# Patient Record
Sex: MALE | Race: WHITE | Employment: OTHER | URBAN - METROPOLITAN AREA
[De-identification: names, ages, dates, MRNs, and addresses within clinical notes are randomized per-mention and may not be internally consistent; named-entity substitution may affect disease eponyms.]

---

## 2017-06-22 ENCOUNTER — OFFICE VISIT (OUTPATIENT)
Dept: FAMILY MEDICINE CLINIC | Age: 67
End: 2017-06-22

## 2017-06-22 VITALS
HEART RATE: 72 BPM | BODY MASS INDEX: 34.27 KG/M2 | SYSTOLIC BLOOD PRESSURE: 130 MMHG | TEMPERATURE: 97.8 F | DIASTOLIC BLOOD PRESSURE: 82 MMHG | WEIGHT: 231.4 LBS | HEIGHT: 69 IN

## 2017-06-22 DIAGNOSIS — K63.5 COLON POLYP: ICD-10-CM

## 2017-06-22 DIAGNOSIS — R10.12 LEFT UPPER QUADRANT PAIN: ICD-10-CM

## 2017-06-22 DIAGNOSIS — L91.8 CUTANEOUS SKIN TAGS: ICD-10-CM

## 2017-06-22 DIAGNOSIS — R10.12 LEFT UPPER QUADRANT PAIN: Primary | ICD-10-CM

## 2017-06-22 LAB
A/G RATIO: 1.9 (ref 1.1–2.2)
ALBUMIN SERPL-MCNC: 4.3 G/DL (ref 3.4–5)
ALP BLD-CCNC: 69 U/L (ref 40–129)
ALT SERPL-CCNC: 29 U/L (ref 10–40)
AMYLASE: 35 U/L (ref 25–115)
ANION GAP SERPL CALCULATED.3IONS-SCNC: 16 MMOL/L (ref 3–16)
AST SERPL-CCNC: 19 U/L (ref 15–37)
BILIRUB SERPL-MCNC: 1.3 MG/DL (ref 0–1)
BILIRUBIN URINE: NEGATIVE
BLOOD, URINE: NEGATIVE
BUN BLDV-MCNC: 18 MG/DL (ref 7–20)
CALCIUM SERPL-MCNC: 9.4 MG/DL (ref 8.3–10.6)
CHLORIDE BLD-SCNC: 103 MMOL/L (ref 99–110)
CLARITY: CLEAR
CO2: 23 MMOL/L (ref 21–32)
COLOR: YELLOW
CREAT SERPL-MCNC: 1 MG/DL (ref 0.8–1.3)
EPITHELIAL CELLS, UA: 0 /HPF (ref 0–5)
GFR AFRICAN AMERICAN: >60
GFR NON-AFRICAN AMERICAN: >60
GLOBULIN: 2.3 G/DL
GLUCOSE BLD-MCNC: 106 MG/DL (ref 70–99)
GLUCOSE URINE: NEGATIVE MG/DL
HCT VFR BLD CALC: 47.7 % (ref 40.5–52.5)
HEMOGLOBIN: 15.6 G/DL (ref 13.5–17.5)
HYALINE CASTS: 1 /LPF (ref 0–8)
KETONES, URINE: NEGATIVE MG/DL
LEUKOCYTE ESTERASE, URINE: NEGATIVE
LIPASE: 25 U/L (ref 13–60)
MCH RBC QN AUTO: 30.8 PG (ref 26–34)
MCHC RBC AUTO-ENTMCNC: 32.7 G/DL (ref 31–36)
MCV RBC AUTO: 94.3 FL (ref 80–100)
MICROSCOPIC EXAMINATION: NORMAL
NITRITE, URINE: NEGATIVE
PDW BLD-RTO: 13.5 % (ref 12.4–15.4)
PH UA: 6
PLATELET # BLD: 169 K/UL (ref 135–450)
PMV BLD AUTO: 11 FL (ref 5–10.5)
POTASSIUM SERPL-SCNC: 4.4 MMOL/L (ref 3.5–5.1)
PROTEIN UA: NEGATIVE MG/DL
RBC # BLD: 5.06 M/UL (ref 4.2–5.9)
RBC UA: 1 /HPF (ref 0–4)
SODIUM BLD-SCNC: 142 MMOL/L (ref 136–145)
SPECIFIC GRAVITY UA: 1.02
TOTAL PROTEIN: 6.6 G/DL (ref 6.4–8.2)
UROBILINOGEN, URINE: 0.2 E.U./DL
WBC # BLD: 6.7 K/UL (ref 4–11)
WBC UA: 2 /HPF (ref 0–5)

## 2017-06-22 PROCEDURE — 3017F COLORECTAL CA SCREEN DOC REV: CPT | Performed by: FAMILY MEDICINE

## 2017-06-22 PROCEDURE — 1036F TOBACCO NON-USER: CPT | Performed by: FAMILY MEDICINE

## 2017-06-22 PROCEDURE — 1123F ACP DISCUSS/DSCN MKR DOCD: CPT | Performed by: FAMILY MEDICINE

## 2017-06-22 PROCEDURE — G8417 CALC BMI ABV UP PARAM F/U: HCPCS | Performed by: FAMILY MEDICINE

## 2017-06-22 PROCEDURE — G8427 DOCREV CUR MEDS BY ELIG CLIN: HCPCS | Performed by: FAMILY MEDICINE

## 2017-06-22 PROCEDURE — 4040F PNEUMOC VAC/ADMIN/RCVD: CPT | Performed by: FAMILY MEDICINE

## 2017-06-22 PROCEDURE — 99213 OFFICE O/P EST LOW 20 MIN: CPT | Performed by: FAMILY MEDICINE

## 2017-06-22 RX ORDER — PANTOPRAZOLE SODIUM 40 MG/1
40 TABLET, DELAYED RELEASE ORAL DAILY
Qty: 30 TABLET | Refills: 2 | Status: SHIPPED | OUTPATIENT
Start: 2017-06-22 | End: 2018-06-12 | Stop reason: CLARIF

## 2017-07-11 ENCOUNTER — OFFICE VISIT (OUTPATIENT)
Dept: FAMILY MEDICINE CLINIC | Age: 67
End: 2017-07-11

## 2017-07-11 VITALS
BODY MASS INDEX: 32.88 KG/M2 | TEMPERATURE: 98.3 F | DIASTOLIC BLOOD PRESSURE: 74 MMHG | WEIGHT: 222 LBS | HEIGHT: 69 IN | SYSTOLIC BLOOD PRESSURE: 116 MMHG

## 2017-07-11 DIAGNOSIS — L60.0 INGROWN NAIL: ICD-10-CM

## 2017-07-11 DIAGNOSIS — K57.32 DIVERTICULITIS OF LARGE INTESTINE WITHOUT PERFORATION OR ABSCESS WITHOUT BLEEDING: Primary | ICD-10-CM

## 2017-07-11 PROCEDURE — G8417 CALC BMI ABV UP PARAM F/U: HCPCS | Performed by: FAMILY MEDICINE

## 2017-07-11 PROCEDURE — G8427 DOCREV CUR MEDS BY ELIG CLIN: HCPCS | Performed by: FAMILY MEDICINE

## 2017-07-11 PROCEDURE — 1036F TOBACCO NON-USER: CPT | Performed by: FAMILY MEDICINE

## 2017-07-11 PROCEDURE — 4040F PNEUMOC VAC/ADMIN/RCVD: CPT | Performed by: FAMILY MEDICINE

## 2017-07-11 PROCEDURE — 3017F COLORECTAL CA SCREEN DOC REV: CPT | Performed by: FAMILY MEDICINE

## 2017-07-11 PROCEDURE — 99213 OFFICE O/P EST LOW 20 MIN: CPT | Performed by: FAMILY MEDICINE

## 2017-07-11 PROCEDURE — 1123F ACP DISCUSS/DSCN MKR DOCD: CPT | Performed by: FAMILY MEDICINE

## 2017-07-14 ENCOUNTER — TELEPHONE (OUTPATIENT)
Dept: FAMILY MEDICINE CLINIC | Age: 67
End: 2017-07-14

## 2017-07-28 ENCOUNTER — TELEPHONE (OUTPATIENT)
Dept: FAMILY MEDICINE CLINIC | Age: 67
End: 2017-07-28

## 2017-08-02 ENCOUNTER — TELEPHONE (OUTPATIENT)
Dept: FAMILY MEDICINE CLINIC | Age: 67
End: 2017-08-02

## 2017-09-18 ENCOUNTER — OFFICE VISIT (OUTPATIENT)
Dept: FAMILY MEDICINE CLINIC | Age: 67
End: 2017-09-18

## 2017-09-18 VITALS
WEIGHT: 207.2 LBS | HEIGHT: 69 IN | BODY MASS INDEX: 30.69 KG/M2 | HEART RATE: 72 BPM | TEMPERATURE: 98.1 F | SYSTOLIC BLOOD PRESSURE: 110 MMHG | DIASTOLIC BLOOD PRESSURE: 64 MMHG

## 2017-09-18 DIAGNOSIS — Z12.5 SPECIAL SCREENING FOR MALIGNANT NEOPLASM OF PROSTATE: ICD-10-CM

## 2017-09-18 DIAGNOSIS — K57.30 DIVERTICULOSIS OF LARGE INTESTINE WITHOUT HEMORRHAGE: Primary | ICD-10-CM

## 2017-09-18 DIAGNOSIS — R73.09 ELEVATED GLUCOSE: ICD-10-CM

## 2017-09-18 PROCEDURE — G8417 CALC BMI ABV UP PARAM F/U: HCPCS | Performed by: FAMILY MEDICINE

## 2017-09-18 PROCEDURE — 3017F COLORECTAL CA SCREEN DOC REV: CPT | Performed by: FAMILY MEDICINE

## 2017-09-18 PROCEDURE — 1036F TOBACCO NON-USER: CPT | Performed by: FAMILY MEDICINE

## 2017-09-18 PROCEDURE — 4040F PNEUMOC VAC/ADMIN/RCVD: CPT | Performed by: FAMILY MEDICINE

## 2017-09-18 PROCEDURE — 1123F ACP DISCUSS/DSCN MKR DOCD: CPT | Performed by: FAMILY MEDICINE

## 2017-09-18 PROCEDURE — 99213 OFFICE O/P EST LOW 20 MIN: CPT | Performed by: FAMILY MEDICINE

## 2017-09-18 PROCEDURE — G8427 DOCREV CUR MEDS BY ELIG CLIN: HCPCS | Performed by: FAMILY MEDICINE

## 2017-09-18 ASSESSMENT — ENCOUNTER SYMPTOMS
SHORTNESS OF BREATH: 0
CONSTIPATION: 0
ABDOMINAL DISTENTION: 0
COUGH: 0
NAUSEA: 0
BLOOD IN STOOL: 0
CHEST TIGHTNESS: 0
ABDOMINAL PAIN: 0
DIARRHEA: 0
VOMITING: 0

## 2017-09-18 ASSESSMENT — PATIENT HEALTH QUESTIONNAIRE - PHQ9
SUM OF ALL RESPONSES TO PHQ QUESTIONS 1-9: 0
2. FEELING DOWN, DEPRESSED OR HOPELESS: 0
SUM OF ALL RESPONSES TO PHQ9 QUESTIONS 1 & 2: 0
1. LITTLE INTEREST OR PLEASURE IN DOING THINGS: 0

## 2017-09-21 DIAGNOSIS — Z12.5 SPECIAL SCREENING FOR MALIGNANT NEOPLASM OF PROSTATE: ICD-10-CM

## 2017-09-21 DIAGNOSIS — K57.30 DIVERTICULOSIS OF LARGE INTESTINE WITHOUT HEMORRHAGE: ICD-10-CM

## 2017-09-21 DIAGNOSIS — R73.09 ELEVATED GLUCOSE: ICD-10-CM

## 2017-09-21 LAB
A/G RATIO: 1.7 (ref 1.1–2.2)
ALBUMIN SERPL-MCNC: 4.1 G/DL (ref 3.4–5)
ALP BLD-CCNC: 83 U/L (ref 40–129)
ALT SERPL-CCNC: 21 U/L (ref 10–40)
ANION GAP SERPL CALCULATED.3IONS-SCNC: 13 MMOL/L (ref 3–16)
AST SERPL-CCNC: 13 U/L (ref 15–37)
BILIRUB SERPL-MCNC: 0.8 MG/DL (ref 0–1)
BUN BLDV-MCNC: 16 MG/DL (ref 7–20)
CALCIUM SERPL-MCNC: 9.1 MG/DL (ref 8.3–10.6)
CHLORIDE BLD-SCNC: 106 MMOL/L (ref 99–110)
CO2: 23 MMOL/L (ref 21–32)
CREAT SERPL-MCNC: 0.8 MG/DL (ref 0.8–1.3)
GFR AFRICAN AMERICAN: >60
GFR NON-AFRICAN AMERICAN: >60
GLOBULIN: 2.4 G/DL
GLUCOSE BLD-MCNC: 86 MG/DL (ref 70–99)
POTASSIUM SERPL-SCNC: 4.2 MMOL/L (ref 3.5–5.1)
PROSTATE SPECIFIC ANTIGEN: 0.69 NG/ML (ref 0–4)
SODIUM BLD-SCNC: 142 MMOL/L (ref 136–145)
TOTAL PROTEIN: 6.5 G/DL (ref 6.4–8.2)

## 2017-09-22 LAB
ESTIMATED AVERAGE GLUCOSE: 96.8 MG/DL
HBA1C MFR BLD: 5 %

## 2018-03-30 ENCOUNTER — OFFICE VISIT (OUTPATIENT)
Dept: FAMILY MEDICINE CLINIC | Age: 68
End: 2018-03-30

## 2018-03-30 VITALS
WEIGHT: 218.4 LBS | HEART RATE: 75 BPM | HEIGHT: 69 IN | DIASTOLIC BLOOD PRESSURE: 70 MMHG | SYSTOLIC BLOOD PRESSURE: 120 MMHG | BODY MASS INDEX: 32.35 KG/M2 | RESPIRATION RATE: 16 BRPM | OXYGEN SATURATION: 97 %

## 2018-03-30 DIAGNOSIS — R21 RASH OF GROIN: Primary | ICD-10-CM

## 2018-03-30 PROCEDURE — 4040F PNEUMOC VAC/ADMIN/RCVD: CPT | Performed by: FAMILY MEDICINE

## 2018-03-30 PROCEDURE — 1036F TOBACCO NON-USER: CPT | Performed by: FAMILY MEDICINE

## 2018-03-30 PROCEDURE — 3017F COLORECTAL CA SCREEN DOC REV: CPT | Performed by: FAMILY MEDICINE

## 2018-03-30 PROCEDURE — G8427 DOCREV CUR MEDS BY ELIG CLIN: HCPCS | Performed by: FAMILY MEDICINE

## 2018-03-30 PROCEDURE — 1123F ACP DISCUSS/DSCN MKR DOCD: CPT | Performed by: FAMILY MEDICINE

## 2018-03-30 PROCEDURE — G8417 CALC BMI ABV UP PARAM F/U: HCPCS | Performed by: FAMILY MEDICINE

## 2018-03-30 PROCEDURE — G8484 FLU IMMUNIZE NO ADMIN: HCPCS | Performed by: FAMILY MEDICINE

## 2018-03-30 PROCEDURE — 99212 OFFICE O/P EST SF 10 MIN: CPT | Performed by: FAMILY MEDICINE

## 2018-03-30 RX ORDER — M-VIT,TX,IRON,MINS/CALC/FOLIC 27MG-0.4MG
1 TABLET ORAL DAILY
COMMUNITY

## 2018-03-30 RX ORDER — TERBINAFINE HYDROCHLORIDE 250 MG/1
250 TABLET ORAL DAILY
Qty: 14 TABLET | Refills: 0 | Status: SHIPPED | OUTPATIENT
Start: 2018-03-30 | End: 2018-04-13

## 2018-03-30 NOTE — PATIENT INSTRUCTIONS
Keep the area clean with soap and water  Ok to continue using the lotrimin cream  Take the medication till gone  Let me know if not resolved

## 2018-04-16 ENCOUNTER — TELEPHONE (OUTPATIENT)
Dept: FAMILY MEDICINE CLINIC | Age: 68
End: 2018-04-16

## 2018-04-26 ENCOUNTER — TELEPHONE (OUTPATIENT)
Dept: FAMILY MEDICINE CLINIC | Age: 68
End: 2018-04-26

## 2018-06-07 ENCOUNTER — TELEPHONE (OUTPATIENT)
Dept: FAMILY MEDICINE CLINIC | Age: 68
End: 2018-06-07

## 2018-06-12 ENCOUNTER — TELEPHONE (OUTPATIENT)
Dept: SURGERY | Age: 68
End: 2018-06-12

## 2018-06-12 ENCOUNTER — INITIAL CONSULT (OUTPATIENT)
Dept: SURGERY | Age: 68
End: 2018-06-12

## 2018-06-12 VITALS
HEIGHT: 69 IN | SYSTOLIC BLOOD PRESSURE: 124 MMHG | DIASTOLIC BLOOD PRESSURE: 70 MMHG | WEIGHT: 208 LBS | BODY MASS INDEX: 30.81 KG/M2

## 2018-06-12 DIAGNOSIS — K42.9 UMBILICAL HERNIA WITHOUT OBSTRUCTION AND WITHOUT GANGRENE: Primary | ICD-10-CM

## 2018-06-12 PROCEDURE — 99203 OFFICE O/P NEW LOW 30 MIN: CPT | Performed by: SURGERY

## 2018-06-12 PROCEDURE — G8427 DOCREV CUR MEDS BY ELIG CLIN: HCPCS | Performed by: SURGERY

## 2018-06-12 PROCEDURE — 4040F PNEUMOC VAC/ADMIN/RCVD: CPT | Performed by: SURGERY

## 2018-06-12 PROCEDURE — 3017F COLORECTAL CA SCREEN DOC REV: CPT | Performed by: SURGERY

## 2018-06-12 PROCEDURE — G8417 CALC BMI ABV UP PARAM F/U: HCPCS | Performed by: SURGERY

## 2018-06-12 PROCEDURE — 1123F ACP DISCUSS/DSCN MKR DOCD: CPT | Performed by: SURGERY

## 2018-06-12 PROCEDURE — 1036F TOBACCO NON-USER: CPT | Performed by: SURGERY

## 2018-06-13 ASSESSMENT — ENCOUNTER SYMPTOMS: ABDOMINAL PAIN: 1

## 2018-07-23 ENCOUNTER — TELEPHONE (OUTPATIENT)
Dept: SURGERY | Age: 68
End: 2018-07-23

## 2018-07-23 ENCOUNTER — PAT TELEPHONE (OUTPATIENT)
Dept: PREADMISSION TESTING | Age: 68
End: 2018-07-23

## 2018-07-23 VITALS — HEIGHT: 69 IN | BODY MASS INDEX: 30.81 KG/M2 | WEIGHT: 208 LBS

## 2018-07-23 NOTE — PRE-PROCEDURE INSTRUCTIONS
[address] [facility] time_0600___________        Surgery time_0730___________    Take the following medications with a sip of water:    Do not eat or drink anything after 12:00 midnight prior to your surgery. This includes water chewing gum, mints and ice chips. You may brush your teeth and gargle the morning of your surgery, but do not swallow the water     Please see your family doctor/pediatrician for a history and physical and/or concerning medications. Bring any test results/reports from your physicians office. If you are under the care of a heart doctor or specialist doctor, please be aware that you may be asked to them for clearance    You may be asked to stop blood thinners such as Coumadin, Plavix, Fragmin, Lovenox, etc., or any anti-inflammatories such as:  Aspirin, Ibuprofen, Advil, Naproxen prior to your surgery. We also ask that you stop any OTC medications such as fish oil, vitamin E, glucosamine, garlic, Multivitamins, COQ 10, etc.    We ask that you do not smoke 24 hours prior to surgery  We ask that you do not  drink any alcoholic beverages 24 hours prior to surgery     You must make arrangements for a responsible adult to take you home after your surgery. For your safety you will not be allowed to leave alone or drive yourself home. Your surgery will be cancelled if you do not have a ride home. Also for your safety, it is strongly suggested that someone stay with you the first 24 hours after your surgery. A parent or legal guardian must accompany a child scheduled for surgery and plan to stay at the hospital until the child is discharged. Please do not bring other children with you. For your comfort, please wear simple loose fitting clothing to the hospital.  Please do not bring valuables.     Do not wear any make-up or nail polish on your fingers or toes      For your safety, please do not wear any jewelry or body piercing's on the

## 2018-07-23 NOTE — LETTER
[address] [facility]ascular Surgery  [address] [facility] [place] [address]  Phone: [phone]  Fax: [phone]    Dario Cain MD        July 23, 2018     Patient: Graciela Ventura   YOB: 1950   Date of Visit: 7/23/2018       To Whom It May Concern: It is my medical opinion that Henrietta  is unable to perform jury duty at this time. Pt is scheduled for outpatient surgery on 7/25/18. Pt will be TTD(temporarily totally disabled) for 4 to 6 weeks following surgery. Please excuse. If you have any questions or concerns, please don't hesitate to call.     Sincerely,        Dario Cain MD

## 2018-07-25 ENCOUNTER — HOSPITAL ENCOUNTER (OUTPATIENT)
Dept: SURGERY | Age: 68
Discharge: HOME | End: 2018-07-25
Attending: SURGERY | Admitting: SURGERY

## 2018-07-25 VITALS
RESPIRATION RATE: 18 BRPM | DIASTOLIC BLOOD PRESSURE: 59 MMHG | SYSTOLIC BLOOD PRESSURE: 109 MMHG | BODY MASS INDEX: 30.99 KG/M2 | OXYGEN SATURATION: 97 % | WEIGHT: 209.88 LBS | TEMPERATURE: 97.5 F | HEART RATE: 63 BPM

## 2018-07-25 DIAGNOSIS — K42.9 UMBILICAL HERNIA WITHOUT OBSTRUCTION AND WITHOUT GANGRENE: Primary | ICD-10-CM

## 2018-07-25 PROCEDURE — 49585 REPAIR UMBILICAL HERN,5+Y/O,REDUC: CPT | Performed by: SURGERY

## 2018-07-25 RX ORDER — NAPROXEN 500 MG/1
500 TABLET ORAL 2 TIMES DAILY WITH MEALS
Qty: 28 TABLET | Refills: 0 | Status: SHIPPED | OUTPATIENT
Start: 2018-07-25 | End: 2018-12-12 | Stop reason: ALTCHOICE

## 2018-07-25 RX ORDER — PROMETHAZINE HYDROCHLORIDE 25 MG/ML
6.25 INJECTION, SOLUTION INTRAMUSCULAR; INTRAVENOUS
Status: ACTIVE | OUTPATIENT
Start: 2018-07-25 | End: 2018-07-25

## 2018-07-25 RX ORDER — FENTANYL CITRATE 50 UG/ML
25 INJECTION, SOLUTION INTRAMUSCULAR; INTRAVENOUS EVERY 5 MIN PRN
Status: DISCONTINUED | OUTPATIENT
Start: 2018-07-25 | End: 2018-07-26 | Stop reason: HOSPADM

## 2018-07-25 RX ORDER — OXYCODONE HYDROCHLORIDE 5 MG/1
5 TABLET ORAL EVERY 6 HOURS PRN
Qty: 20 TABLET | Refills: 0 | Status: SHIPPED | OUTPATIENT
Start: 2018-07-25 | End: 2018-07-30

## 2018-07-25 RX ORDER — ONDANSETRON 2 MG/ML
4 INJECTION INTRAMUSCULAR; INTRAVENOUS
Status: ACTIVE | OUTPATIENT
Start: 2018-07-25 | End: 2018-07-25

## 2018-07-25 RX ORDER — CIPROFLOXACIN 2 MG/ML
400 INJECTION, SOLUTION INTRAVENOUS ONCE
Status: COMPLETED | OUTPATIENT
Start: 2018-07-25 | End: 2018-07-25

## 2018-07-25 RX ORDER — SODIUM CHLORIDE 0.9 % (FLUSH) 0.9 %
10 SYRINGE (ML) INJECTION EVERY 12 HOURS SCHEDULED
Status: DISCONTINUED | OUTPATIENT
Start: 2018-07-25 | End: 2018-07-26 | Stop reason: HOSPADM

## 2018-07-25 RX ORDER — SODIUM CHLORIDE 0.9 % (FLUSH) 0.9 %
10 SYRINGE (ML) INJECTION PRN
Status: DISCONTINUED | OUTPATIENT
Start: 2018-07-25 | End: 2018-07-26 | Stop reason: HOSPADM

## 2018-07-25 RX ORDER — FENTANYL CITRATE 50 UG/ML
50 INJECTION, SOLUTION INTRAMUSCULAR; INTRAVENOUS EVERY 5 MIN PRN
Status: DISCONTINUED | OUTPATIENT
Start: 2018-07-25 | End: 2018-07-26 | Stop reason: HOSPADM

## 2018-07-25 RX ORDER — HYDROCODONE BITARTRATE AND ACETAMINOPHEN 5; 325 MG/1; MG/1
1 TABLET ORAL ONCE
Status: COMPLETED | OUTPATIENT
Start: 2018-07-25 | End: 2018-07-25

## 2018-07-25 RX ORDER — SODIUM CHLORIDE 9 MG/ML
INJECTION, SOLUTION INTRAVENOUS CONTINUOUS
Status: DISCONTINUED | OUTPATIENT
Start: 2018-07-25 | End: 2018-07-26 | Stop reason: HOSPADM

## 2018-07-25 RX ADMIN — SODIUM CHLORIDE: 9 INJECTION, SOLUTION INTRAVENOUS at 06:28

## 2018-07-25 RX ADMIN — CIPROFLOXACIN 400 MG: 2 INJECTION, SOLUTION INTRAVENOUS at 07:09

## 2018-07-25 RX ADMIN — HYDROCODONE BITARTRATE AND ACETAMINOPHEN 1 TABLET: 5; 325 TABLET ORAL at 09:11

## 2018-07-25 ASSESSMENT — LIFESTYLE VARIABLES: SMOKING_STATUS: 0

## 2018-07-25 ASSESSMENT — PAIN - FUNCTIONAL ASSESSMENT: PAIN_FUNCTIONAL_ASSESSMENT: 0-10

## 2018-07-25 ASSESSMENT — PAIN DESCRIPTION - PROGRESSION: CLINICAL_PROGRESSION: GRADUALLY IMPROVING

## 2018-07-25 ASSESSMENT — PAIN SCALES - GENERAL
PAINLEVEL_OUTOF10: 4
PAINLEVEL_OUTOF10: 0
PAINLEVEL_OUTOF10: 0
PAINLEVEL_OUTOF10: 3

## 2018-07-25 ASSESSMENT — PAIN DESCRIPTION - DESCRIPTORS: DESCRIPTORS: ACHING

## 2018-07-25 ASSESSMENT — PAIN DESCRIPTION - PAIN TYPE: TYPE: SURGICAL PAIN

## 2018-07-25 ASSESSMENT — PAIN DESCRIPTION - LOCATION: LOCATION: ABDOMEN

## 2018-07-25 NOTE — ANESTHESIA POST-OP
[facility] Department of Anesthesiology  Post-Anesthesia Note       Name:  Shira Chamberlain                                  Age:  80 y.o. MRN:  [id number]     Last Vitals & Oxygen Saturation: BP (!) 109/59   Pulse 63   Temp 97.5 °F (36.4 °C) (Temporal)   Resp 18   Wt 209 lb 14.1 oz (95.2 kg)   SpO2 97%   BMI 30.99 kg/m²   No data found. Level of consciousness:  Awake, alert    Respiratory: Respirations easy, no distress. Stable. Cardiovascular: Hemodynamically stable. Hydration: Adequate. PONV: Adequately managed. Post-op pain: Adequately controlled. Post-op assessment: Tolerated anesthetic well without complication. Complications:  None.     Malinda Acuña MD  July 25, 2018   4:27 PM

## 2018-07-25 NOTE — ANESTHESIA PRE-OP
AGRATIO 1.7 09/21/2017    LABGLOM >60 09/21/2017    GLUCOSE 86 09/21/2017    PROT 6.5 09/21/2017    PROT 6.7 11/04/2012    CALCIUM 9.1 09/21/2017    BILITOT 0.8 09/21/2017    ALKPHOS 83 09/21/2017    AST 13 09/21/2017    ALT 21 09/21/2017     BMP    Lab Results   Component Value Date     09/21/2017    K 4.2 09/21/2017     09/21/2017    CO2 23 09/21/2017    BUN 16 09/21/2017    CREATININE 0.8 09/21/2017    CALCIUM 9.1 09/21/2017    GFRAA >60 09/21/2017    GFRAA >60 11/04/2012    LABGLOM >60 09/21/2017    GLUCOSE 86 09/21/2017     POCGlucose  No results for input(s): GLUCOSE in the last 72 hours. Coags  No results found for: PROTIME, INR, APTT  HCG (If Applicable) No results found for: PREGTESTUR, PREGSERUM, HCG, HCGQUANT   ABGs No results found for: PHART, PO2ART, JMQ5DGK, BSX2MBB, BEART, N1FXFHSK   Type & Screen (If Applicable)  No results found for: LABABO, LABRH                         BMI: Wt Readings from Last 3 Encounters:       NPO Status:   Date of last liquid consumption: 07/24/18   Time of last liquid consumption: 2200   Date of last solid food consumption: 07/24/18      Time of last solid consumption: 2200       Anesthesia Evaluation  Patient summary reviewed no history of anesthetic complications:   Airway: Mallampati: II  TM distance: >3 FB   Neck ROM: full  Mouth opening: > = 3 FB Dental:          Pulmonary:Negative Pulmonary ROS breath sounds clear to auscultation      (-) COPD, asthma and not a current smoker                           Cardiovascular:Negative CV ROS        (-) hypertension and past MI      Rhythm: regular  Rate: normal                    Neuro/Psych:   Negative Neuro/Psych ROS     (-) seizures, TIA and CVA           GI/Hepatic/Renal: Neg GI/Hepatic/Renal ROS            Endo/Other: Negative Endo/Other ROS       (-) diabetes mellitus, hypothyroidism               Abdominal:   (+) obese,         Vascular: negative vascular ROS.     - DVT and PE.

## 2018-07-25 NOTE — H&P
midline   LUNGS:  clear to auscultation  CARDIOVASCULAR:  regular rate and rhythm and no murmur noted  ABDOMEN:  Umbilical hernia  MUSCULOSKELETAL:  0+ pitting edema lower extremities  NEUROLOGIC:  Mental Status Exam:  Level of Alertness:   awake  Orientation:   person, place, time      ASSESSMENT AND PLAN:    Umbilical hernia  For repair today  The risks, benefits and alternatives to the planned procedure were discussed. Patient expressed an understanding and is willing to proceed.     Electronically signed by Prateek Alvares MD on 7/25/2018 at [address] [facility]

## 2018-08-07 ENCOUNTER — OFFICE VISIT (OUTPATIENT)
Dept: SURGERY | Age: 68
End: 2018-08-07

## 2018-08-07 DIAGNOSIS — K42.9 UMBILICAL HERNIA WITHOUT OBSTRUCTION AND WITHOUT GANGRENE: Primary | ICD-10-CM

## 2018-08-07 PROCEDURE — 99024 POSTOP FOLLOW-UP VISIT: CPT | Performed by: SURGERY

## 2018-12-12 ENCOUNTER — OFFICE VISIT (OUTPATIENT)
Dept: FAMILY MEDICINE CLINIC | Age: 68
End: 2018-12-12
Payer: MEDICARE

## 2018-12-12 VITALS
TEMPERATURE: 98.1 F | BODY MASS INDEX: 31.84 KG/M2 | DIASTOLIC BLOOD PRESSURE: 76 MMHG | HEART RATE: 72 BPM | WEIGHT: 215 LBS | SYSTOLIC BLOOD PRESSURE: 128 MMHG | HEIGHT: 69 IN

## 2018-12-12 DIAGNOSIS — R53.83 OTHER FATIGUE: Primary | ICD-10-CM

## 2018-12-12 DIAGNOSIS — R40.0 DAYTIME SLEEPINESS: ICD-10-CM

## 2018-12-12 DIAGNOSIS — R53.83 OTHER FATIGUE: ICD-10-CM

## 2018-12-12 DIAGNOSIS — Z12.5 PROSTATE CANCER SCREENING: ICD-10-CM

## 2018-12-12 LAB
A/G RATIO: 1.7 (ref 1.1–2.2)
ALBUMIN SERPL-MCNC: 4.5 G/DL (ref 3.4–5)
ALP BLD-CCNC: 68 U/L (ref 40–129)
ALT SERPL-CCNC: 20 U/L (ref 10–40)
ANION GAP SERPL CALCULATED.3IONS-SCNC: 12 MMOL/L (ref 3–16)
AST SERPL-CCNC: 18 U/L (ref 15–37)
BASOPHILS ABSOLUTE: 0 K/UL (ref 0–0.2)
BASOPHILS RELATIVE PERCENT: 0.6 %
BILIRUB SERPL-MCNC: 1.1 MG/DL (ref 0–1)
BILIRUBIN URINE: NEGATIVE
BLOOD, URINE: NEGATIVE
BUN BLDV-MCNC: 18 MG/DL (ref 7–20)
CALCIUM SERPL-MCNC: 9.6 MG/DL (ref 8.3–10.6)
CHLORIDE BLD-SCNC: 104 MMOL/L (ref 99–110)
CLARITY: CLEAR
CO2: 26 MMOL/L (ref 21–32)
COLOR: YELLOW
CREAT SERPL-MCNC: 0.9 MG/DL (ref 0.8–1.3)
EOSINOPHILS ABSOLUTE: 0.2 K/UL (ref 0–0.6)
EOSINOPHILS RELATIVE PERCENT: 2.2 %
EPITHELIAL CELLS, UA: 0 /HPF (ref 0–5)
FOLATE: 13.28 NG/ML (ref 4.78–24.2)
GFR AFRICAN AMERICAN: >60
GFR NON-AFRICAN AMERICAN: >60
GLOBULIN: 2.7 G/DL
GLUCOSE BLD-MCNC: 90 MG/DL (ref 70–99)
GLUCOSE URINE: NEGATIVE MG/DL
HCT VFR BLD CALC: 50.7 % (ref 40.5–52.5)
HEMOGLOBIN: 16.9 G/DL (ref 13.5–17.5)
HYALINE CASTS: 0 /LPF (ref 0–8)
KETONES, URINE: NEGATIVE MG/DL
LEUKOCYTE ESTERASE, URINE: NEGATIVE
LYMPHOCYTES ABSOLUTE: 1.1 K/UL (ref 1–5.1)
LYMPHOCYTES RELATIVE PERCENT: 14.2 %
MCH RBC QN AUTO: 31.9 PG (ref 26–34)
MCHC RBC AUTO-ENTMCNC: 33.3 G/DL (ref 31–36)
MCV RBC AUTO: 95.9 FL (ref 80–100)
MICROSCOPIC EXAMINATION: NORMAL
MONOCYTES ABSOLUTE: 0.4 K/UL (ref 0–1.3)
MONOCYTES RELATIVE PERCENT: 5.6 %
NEUTROPHILS ABSOLUTE: 5.9 K/UL (ref 1.7–7.7)
NEUTROPHILS RELATIVE PERCENT: 77.4 %
NITRITE, URINE: NEGATIVE
PDW BLD-RTO: 13.4 % (ref 12.4–15.4)
PH UA: 6
PLATELET # BLD: 157 K/UL (ref 135–450)
PMV BLD AUTO: 10.9 FL (ref 5–10.5)
POTASSIUM SERPL-SCNC: 4.5 MMOL/L (ref 3.5–5.1)
PROSTATE SPECIFIC ANTIGEN: 1.33 NG/ML (ref 0–4)
PROTEIN UA: NEGATIVE MG/DL
RBC # BLD: 5.29 M/UL (ref 4.2–5.9)
RBC UA: 3 /HPF (ref 0–4)
SODIUM BLD-SCNC: 142 MMOL/L (ref 136–145)
SPECIFIC GRAVITY UA: 1.03
TOTAL PROTEIN: 7.2 G/DL (ref 6.4–8.2)
TSH SERPL DL<=0.05 MIU/L-ACNC: 1.8 UIU/ML (ref 0.27–4.2)
UROBILINOGEN, URINE: 0.2 E.U./DL
VITAMIN B-12: 340 PG/ML (ref 211–911)
WBC # BLD: 7.6 K/UL (ref 4–11)
WBC UA: 1 /HPF (ref 0–5)

## 2018-12-12 PROCEDURE — 3017F COLORECTAL CA SCREEN DOC REV: CPT | Performed by: FAMILY MEDICINE

## 2018-12-12 PROCEDURE — 1036F TOBACCO NON-USER: CPT | Performed by: FAMILY MEDICINE

## 2018-12-12 PROCEDURE — G8417 CALC BMI ABV UP PARAM F/U: HCPCS | Performed by: FAMILY MEDICINE

## 2018-12-12 PROCEDURE — G8484 FLU IMMUNIZE NO ADMIN: HCPCS | Performed by: FAMILY MEDICINE

## 2018-12-12 PROCEDURE — 1123F ACP DISCUSS/DSCN MKR DOCD: CPT | Performed by: FAMILY MEDICINE

## 2018-12-12 PROCEDURE — G8427 DOCREV CUR MEDS BY ELIG CLIN: HCPCS | Performed by: FAMILY MEDICINE

## 2018-12-12 PROCEDURE — 4040F PNEUMOC VAC/ADMIN/RCVD: CPT | Performed by: FAMILY MEDICINE

## 2018-12-12 PROCEDURE — 1101F PT FALLS ASSESS-DOCD LE1/YR: CPT | Performed by: FAMILY MEDICINE

## 2018-12-12 PROCEDURE — 99213 OFFICE O/P EST LOW 20 MIN: CPT | Performed by: FAMILY MEDICINE

## 2018-12-12 ASSESSMENT — ENCOUNTER SYMPTOMS
CONSTIPATION: 0
BLOOD IN STOOL: 0
VOMITING: 0
SHORTNESS OF BREATH: 0
COUGH: 0
CHEST TIGHTNESS: 0
NAUSEA: 0
ABDOMINAL PAIN: 0
ABDOMINAL DISTENTION: 0
DIARRHEA: 0

## 2018-12-12 ASSESSMENT — PATIENT HEALTH QUESTIONNAIRE - PHQ9
SUM OF ALL RESPONSES TO PHQ QUESTIONS 1-9: 0
SUM OF ALL RESPONSES TO PHQ QUESTIONS 1-9: 0
2. FEELING DOWN, DEPRESSED OR HOPELESS: 0
1. LITTLE INTEREST OR PLEASURE IN DOING THINGS: 0
SUM OF ALL RESPONSES TO PHQ9 QUESTIONS 1 & 2: 0

## 2019-05-14 ENCOUNTER — TELEPHONE (OUTPATIENT)
Dept: FAMILY MEDICINE CLINIC | Age: 69
End: 2019-05-14

## 2019-05-14 DIAGNOSIS — R97.20 RISING PSA LEVEL: Primary | ICD-10-CM

## 2019-05-15 DIAGNOSIS — R97.20 RISING PSA LEVEL: ICD-10-CM

## 2019-05-15 LAB — PROSTATE SPECIFIC ANTIGEN: 0.94 NG/ML (ref 0–4)

## 2019-05-23 ENCOUNTER — HOSPITAL ENCOUNTER (EMERGENCY)
Age: 69
Discharge: HOME | End: 2019-05-23
Attending: EMERGENCY MEDICINE
Payer: MEDICARE

## 2019-05-23 VITALS
RESPIRATION RATE: 20 BRPM | HEIGHT: 69 IN | BODY MASS INDEX: 32.29 KG/M2 | DIASTOLIC BLOOD PRESSURE: 75 MMHG | HEART RATE: 68 BPM | OXYGEN SATURATION: 97 % | SYSTOLIC BLOOD PRESSURE: 125 MMHG | TEMPERATURE: 97.5 F | WEIGHT: 218.03 LBS

## 2019-05-23 DIAGNOSIS — N41.0 ACUTE PROSTATITIS: Primary | ICD-10-CM

## 2019-05-23 PROCEDURE — 99283 EMERGENCY DEPT VISIT LOW MDM: CPT

## 2019-05-23 RX ORDER — CIPROFLOXACIN 500 MG/1
500 TABLET, FILM COATED ORAL 2 TIMES DAILY
Qty: 28 TABLET | Refills: 0 | Status: SHIPPED | OUTPATIENT
Start: 2019-05-23 | End: 2019-06-06

## 2019-05-23 ASSESSMENT — PAIN SCALES - GENERAL
PAINLEVEL_OUTOF10: 4
PAINLEVEL_OUTOF10: 3

## 2019-05-23 ASSESSMENT — PAIN DESCRIPTION - PAIN TYPE
TYPE: ACUTE PAIN
TYPE: ACUTE PAIN

## 2019-05-23 ASSESSMENT — PAIN DESCRIPTION - DESCRIPTORS: DESCRIPTORS: PRESSURE

## 2019-05-23 ASSESSMENT — PAIN DESCRIPTION - LOCATION: LOCATION: OTHER (COMMENT)

## 2019-05-23 NOTE — ED PROVIDER NOTES
CHIEF COMPLAINT  Chief Complaint   Patient presents with    Elevated PSA     pt. thiinks his prostate is enlarged and is having pain with sitting on a hard chair for a week       HISTORY OF PRESENT ILLNESS  Sanchez Henao is a 77 y.o. male who presents to the ED complaining of a two-week history of perineal pain, especially when sitting on a chair. Patient denies scrotal pain or penile pain. No dysuria or hematuria. No abdominal pain. No nausea or vomiting. No constipation. No diarrhea. No bloody stool. No back pain. No other complaints, modifying factors or associated symptoms. Nursing notes reviewed.    Past Medical History:   Diagnosis Date    Seasonal allergies      Past Surgical History:   Procedure Laterality Date    COLONOSCOPY  9/4/2012    Dr. Estella Tamayo, check in 5 years    COLONOSCOPY  09/06/2017    dr rhodes at [facility] polyp, repeat 5 years   [address]  75/80/4011    umbilical    NASAL SINUS SURGERY  about 1998     Family History   Problem Relation Age of Onset    Cancer Father         colon    High Blood Pressure Father      Social History     Socioeconomic History    Marital status:      Spouse name: Not on file    Number of children: Not on file    Years of education: Not on file    Highest education level: Not on file   Occupational History    Not on file   Social Needs    Financial resource strain: Not on file    Food insecurity:     Worry: Not on file     Inability: Not on file    Transportation needs:     Medical: Not on file     Non-medical: Not on file   Tobacco Use    Smoking status: Never Smoker    Smokeless tobacco: Never Used   Substance and Sexual Activity    Alcohol use: Yes     Comment: social use    Drug use: No    Sexual activity: Yes     Partners: Female   Lifestyle    Physical activity:     Days per week: Not on file     Minutes per session: Not on file    Stress: Not on file   Relationships    Social connections:     Talks on phone: Not on file     Gets together: Not on file     Attends Confucianism service: Not on file     Active member of club or organization: Not on file     Attends meetings of clubs or organizations: Not on file     Relationship status: Not on file    Intimate partner violence:     Fear of current or ex partner: Not on file     Emotionally abused: Not on file     Physically abused: Not on file     Forced sexual activity: Not on file   Other Topics Concern    Not on file   Social History Narrative    Not on file     No current facility-administered medications for this encounter. Current Outpatient Medications   Medication Sig Dispense Refill    ciprofloxacin (CIPRO) 500 MG tablet Take 1 tablet by mouth 2 times daily for 14 days 28 tablet 0    Multiple Vitamins-Minerals (THERAPEUTIC MULTIVITAMIN-MINERALS) tablet Take 1 tablet by mouth daily      glucosamine-chondroitin (GLUCOSAMINE CHONDR COMPLEX) 500-400 MG CAPS Take  by mouth.  albuterol sulfate HFA (VENTOLIN HFA) 108 (90 BASE) MCG/ACT inhaler Inhale 2 puffs into the lungs every 6 hours as needed for Wheezing Dispense with a spacing device 1 Inhaler 0     Allergies   Allergen Reactions    Penicillins Hives       REVIEW OF SYSTEMS  Positives and pertinent negatives as per HPI. Six others systems were reviewed and are negative. Nursing notes pertaining to ROS were reviewed. PHYSICAL EXAM   /73   Pulse 86   Temp 97.5 °F (36.4 °C) (Oral)   Resp 16   Ht 5' 9\" (1.753 m)   Wt 218 lb 0.6 oz (98.9 kg)   SpO2 97%   BMI 32.20 kg/m²   GENERAL APPEARANCE: Awake and alert. Cooperative. No acute distress. HEAD: Normocephalic. Atraumatic. EYES: PERRL. EOM's grossly intact. No scleral icterus, injection or exudate  ENT: Mucous membranes are moist.  NECK: Supple. Normal ROM. CHEST:  Regular rate and rhythm, no murmurs, rubs or gallops  LUNGS: Breathing is unlabored. Speaking comfortably in full sentences.  Clear through auscultation bilaterally  ABDOMEN: Nondistended, nontender. No mass  Rectal:  No evidence of external or internal hemorrhoids. Normal sphincter tone. No evidence of perianal or perirectal abscess. The prostate is tender to palpation, mildly boggy and enlarged and palpation reproduces the patient's discomfort. There is no evidence of peritoneal erythema, induration or abscess. Scrotum is nontender to palpation with no scrotal mass and no testicular tenderness. External genitalia are without lesions  EXTREMITIES: MAEE. No acute deformities. SKIN: Warm and dry. NEUROLOGICAL: Alert and oriented. ED COURSE/MDM  Acute prostatitis:  Patient will be placed on Cipro 500 mg by mouth twice a day ×2 weeks. Patient is not taking any other agents that would prolong his QTC. Patient was advised to use Tylenol or ibuprofen for anti-inflammatory effect and follow up with his family physician within one to 2 weeks if symptoms are not improving. Patient was given scripts for the following medications. I counseled patient how to take these medications. New Prescriptions    CIPROFLOXACIN (CIPRO) 500 MG TABLET    Take 1 tablet by mouth 2 times daily for 14 days         CLINICAL IMPRESSION  1. Acute prostatitis        Blood pressure 132/73, pulse 86, temperature 97.5 °F (36.4 °C), temperature source Oral, resp. rate 16, height 5' 9\" (1.753 m), weight 218 lb 0.6 oz (98.9 kg), SpO2 97 %.       Follow-up with:  Jay Salamanca MD  [address]  [phone]    In 1 week  If symptoms worsen       Sabrina Thrasher MD  05/23/19 1985

## 2019-05-23 NOTE — ED NOTES
Discharge instructions reviewed. Patient verbalized understanding. Prescription x1 given.        Joceline Taylor RN  05/23/19 0470

## 2019-05-23 NOTE — ED TRIAGE NOTES
Pt. Is concerned with prostate being enlarged, having pain with sitting on hard chairs. Had blood work done by PMD earlier this month.

## 2019-06-03 ENCOUNTER — OFFICE VISIT (OUTPATIENT)
Dept: FAMILY MEDICINE CLINIC | Age: 69
End: 2019-06-03
Payer: MEDICARE

## 2019-06-03 VITALS
HEART RATE: 70 BPM | SYSTOLIC BLOOD PRESSURE: 132 MMHG | TEMPERATURE: 98.3 F | BODY MASS INDEX: 31.6 KG/M2 | DIASTOLIC BLOOD PRESSURE: 76 MMHG | OXYGEN SATURATION: 98 % | WEIGHT: 214 LBS

## 2019-06-03 DIAGNOSIS — R09.81 SINUS CONGESTION: Primary | ICD-10-CM

## 2019-06-03 DIAGNOSIS — N41.0 ACUTE PROSTATITIS: ICD-10-CM

## 2019-06-03 PROCEDURE — 1036F TOBACCO NON-USER: CPT | Performed by: FAMILY MEDICINE

## 2019-06-03 PROCEDURE — 99213 OFFICE O/P EST LOW 20 MIN: CPT | Performed by: FAMILY MEDICINE

## 2019-06-03 PROCEDURE — G8417 CALC BMI ABV UP PARAM F/U: HCPCS | Performed by: FAMILY MEDICINE

## 2019-06-03 PROCEDURE — 3017F COLORECTAL CA SCREEN DOC REV: CPT | Performed by: FAMILY MEDICINE

## 2019-06-03 PROCEDURE — G8427 DOCREV CUR MEDS BY ELIG CLIN: HCPCS | Performed by: FAMILY MEDICINE

## 2019-06-03 PROCEDURE — 1123F ACP DISCUSS/DSCN MKR DOCD: CPT | Performed by: FAMILY MEDICINE

## 2019-06-03 PROCEDURE — 4040F PNEUMOC VAC/ADMIN/RCVD: CPT | Performed by: FAMILY MEDICINE

## 2019-06-03 NOTE — PATIENT INSTRUCTIONS
Do finish the antibiotic  Add flonase nasal spray daily one squirt twice a day.  Use for 10-14 days  Add claritin daily  Call me later this week

## 2019-06-03 NOTE — PROGRESS NOTES
Subjective:      Patient ID: Lorena Enciso is a 77 y.o. male. Patient presents with:  Sinus Problem  Follow-Up from Hospital: Acute protatitis 05/23/19    Was in the hospital er for prostatitis  Has a few more antibiotics  No fever  No urine sx now  No abdomen pain  No joint pain    pnd for a month, sinus congestion, ears feel full  Slight cough. Not using any medicines for this at this time  No sneeze but does have  Runny nose     YOB: 1950    Date of Visit:  6/3/2019     -- Penicillins -- Hives    Current Outpatient Medications:  ciprofloxacin (CIPRO) 500 MG tablet, Take 1 tablet by mouth 2 times daily for 14 days, Disp: 28 tablet, Rfl: 0  Multiple Vitamins-Minerals (THERAPEUTIC MULTIVITAMIN-MINERALS) tablet, Take 1 tablet by mouth daily, Disp: , Rfl:   glucosamine-chondroitin (GLUCOSAMINE CHONDR COMPLEX) 500-400 MG CAPS, Take  by mouth., Disp: , Rfl:   albuterol sulfate HFA (VENTOLIN HFA) 108 (90 BASE) MCG/ACT inhaler, Inhale 2 puffs into the lungs every 6 hours as needed for Wheezing Dispense with a spacing device, Disp: 1 Inhaler, Rfl: 0    No current facility-administered medications for this visit.       ---------------------------               06/03/19                      1419         ---------------------------   BP:          132/76         Site:    Left Upper Arm     Position:     Sitting        Cuff Size:   Large Adult      Pulse:         70           Temp:   98.3 °F (36.8 °C)   TempSrc:      Oral          SpO2:          98%          Weight: 214 lb (97.1 kg)   ---------------------------  Body mass index is 31.6 kg/m².      Wt Readings from Last 3 Encounters:  06/03/19 : 214 lb (97.1 kg)  05/23/19 : 218 lb 0.6 oz (98.9 kg)  12/12/18 : 215 lb (97.5 kg)    BP Readings from Last 3 Encounters:  06/03/19 : 132/76  05/23/19 : 125/75  12/12/18 : 128/76                Review of Systems    Objective:   Physical Exam   Constitutional: He appears well-developed and well-nourished. No distress. HENT:   Head: Normocephalic and atraumatic. Right Ear: Tympanic membrane and ear canal normal.   Left Ear: Tympanic membrane and ear canal normal.   Nose: Mucosal edema present. Mouth/Throat: Uvula is midline, oropharynx is clear and moist and mucous membranes are normal. No oral lesions. Pale swollen nares. No d/c   Neck: Neck supple. Pulmonary/Chest: Effort normal and breath sounds normal. No accessory muscle usage. No tachypnea. No respiratory distress. He has no decreased breath sounds. He has no wheezes. He has no rhonchi. He has no rales. Abdominal: Soft. Normal appearance. He exhibits no mass. There is no hepatosplenomegaly. There is no tenderness. There is no rigidity, no guarding and no CVA tenderness. Lymphadenopathy:        Head (right side): No submental, no submandibular, no preauricular and no posterior auricular adenopathy present. Head (left side): No submental, no submandibular, no preauricular and no posterior auricular adenopathy present. He has no cervical adenopathy. Right: No supraclavicular adenopathy present. Left: No supraclavicular adenopathy present. Neurological: He is alert. Skin: Skin is warm, dry and intact. He is not diaphoretic. No pallor. Assessment:       Diagnosis Orders   1. Sinus congestion     2. Acute prostatitis       resolving prostatitis      Plan:      Do finish the antibiotic  Add flonase nasal spray daily one squirt twice a day.  Use for 10-14 days  Add claritin daily  Call me later this week        Jayjay Barbour MD

## 2019-06-07 ENCOUNTER — TELEPHONE (OUTPATIENT)
Dept: FAMILY MEDICINE CLINIC | Age: 69
End: 2019-06-07

## 2019-06-07 ENCOUNTER — HOSPITAL ENCOUNTER (OUTPATIENT)
Age: 69
Discharge: HOME | End: 2019-06-07
Payer: MEDICARE

## 2019-06-07 ENCOUNTER — HOSPITAL ENCOUNTER (OUTPATIENT)
Dept: GENERAL RADIOLOGY | Age: 69
Discharge: HOME | End: 2019-06-07
Payer: MEDICARE

## 2019-06-07 DIAGNOSIS — R05.9 COUGH: ICD-10-CM

## 2019-06-07 DIAGNOSIS — R05.9 COUGH: Primary | ICD-10-CM

## 2019-06-07 PROCEDURE — 71046 X-RAY EXAM CHEST 2 VIEWS: CPT

## 2019-06-07 NOTE — TELEPHONE ENCOUNTER
Pt called to update Dr Mukund Medina. Pt stated SX are gone.  He still has a nagging cough, should he use his inhaler or what do you suggest?    Please advise  [phone]

## 2019-06-11 RX ORDER — LEVOFLOXACIN 500 MG/1
500 TABLET, FILM COATED ORAL DAILY
Qty: 7 TABLET | Refills: 0 | Status: SHIPPED | OUTPATIENT
Start: 2019-06-11 | End: 2019-06-18

## 2019-07-18 ENCOUNTER — TELEPHONE (OUTPATIENT)
Dept: FAMILY MEDICINE CLINIC | Age: 69
End: 2019-07-18

## 2019-07-18 DIAGNOSIS — J18.9 PNEUMONIA OF LEFT UPPER LOBE DUE TO INFECTIOUS ORGANISM: Primary | ICD-10-CM

## 2019-07-19 ENCOUNTER — HOSPITAL ENCOUNTER (OUTPATIENT)
Age: 69
Discharge: HOME | End: 2019-07-19
Payer: MEDICARE

## 2019-07-19 ENCOUNTER — HOSPITAL ENCOUNTER (OUTPATIENT)
Dept: GENERAL RADIOLOGY | Age: 69
Discharge: HOME | End: 2019-07-19
Payer: MEDICARE

## 2019-07-19 DIAGNOSIS — J18.9 PNEUMONIA OF LEFT UPPER LOBE DUE TO INFECTIOUS ORGANISM: ICD-10-CM

## 2019-07-19 PROCEDURE — 71046 X-RAY EXAM CHEST 2 VIEWS: CPT

## 2019-10-03 ENCOUNTER — OFFICE VISIT (OUTPATIENT)
Dept: ORTHOPEDIC SURGERY | Age: 69
End: 2019-10-03
Payer: MEDICARE

## 2019-10-03 VITALS
BODY MASS INDEX: 32.44 KG/M2 | HEART RATE: 74 BPM | DIASTOLIC BLOOD PRESSURE: 81 MMHG | WEIGHT: 219 LBS | SYSTOLIC BLOOD PRESSURE: 144 MMHG | HEIGHT: 69 IN | TEMPERATURE: 98.4 F

## 2019-10-03 DIAGNOSIS — M17.11 PRIMARY OSTEOARTHRITIS OF RIGHT KNEE: ICD-10-CM

## 2019-10-03 DIAGNOSIS — M25.561 RIGHT KNEE PAIN, UNSPECIFIED CHRONICITY: Primary | ICD-10-CM

## 2019-10-03 PROCEDURE — 4040F PNEUMOC VAC/ADMIN/RCVD: CPT | Performed by: PHYSICIAN ASSISTANT

## 2019-10-03 PROCEDURE — 3017F COLORECTAL CA SCREEN DOC REV: CPT | Performed by: PHYSICIAN ASSISTANT

## 2019-10-03 PROCEDURE — G8417 CALC BMI ABV UP PARAM F/U: HCPCS | Performed by: PHYSICIAN ASSISTANT

## 2019-10-03 PROCEDURE — 1036F TOBACCO NON-USER: CPT | Performed by: PHYSICIAN ASSISTANT

## 2019-10-03 PROCEDURE — G8427 DOCREV CUR MEDS BY ELIG CLIN: HCPCS | Performed by: PHYSICIAN ASSISTANT

## 2019-10-03 PROCEDURE — 20610 DRAIN/INJ JOINT/BURSA W/O US: CPT | Performed by: PHYSICIAN ASSISTANT

## 2019-10-03 PROCEDURE — 99203 OFFICE O/P NEW LOW 30 MIN: CPT | Performed by: PHYSICIAN ASSISTANT

## 2019-10-03 PROCEDURE — 1123F ACP DISCUSS/DSCN MKR DOCD: CPT | Performed by: PHYSICIAN ASSISTANT

## 2019-10-03 PROCEDURE — G8484 FLU IMMUNIZE NO ADMIN: HCPCS | Performed by: PHYSICIAN ASSISTANT

## 2020-01-05 ENCOUNTER — HOSPITAL ENCOUNTER (EMERGENCY)
Age: 70
Discharge: HOME | End: 2020-01-05
Attending: EMERGENCY MEDICINE
Payer: MEDICARE

## 2020-01-05 ENCOUNTER — APPOINTMENT (OUTPATIENT)
Dept: GENERAL RADIOLOGY | Age: 70
End: 2020-01-05
Payer: MEDICARE

## 2020-01-05 VITALS
BODY MASS INDEX: 33.67 KG/M2 | WEIGHT: 227.29 LBS | SYSTOLIC BLOOD PRESSURE: 154 MMHG | TEMPERATURE: 98.6 F | RESPIRATION RATE: 18 BRPM | HEIGHT: 69 IN | OXYGEN SATURATION: 97 % | HEART RATE: 86 BPM | DIASTOLIC BLOOD PRESSURE: 93 MMHG

## 2020-01-05 LAB
RAPID INFLUENZA  B AGN: NEGATIVE
RAPID INFLUENZA A AGN: POSITIVE
S PYO AG THROAT QL: POSITIVE

## 2020-01-05 PROCEDURE — 99283 EMERGENCY DEPT VISIT LOW MDM: CPT

## 2020-01-05 PROCEDURE — 87804 INFLUENZA ASSAY W/OPTIC: CPT

## 2020-01-05 PROCEDURE — 6370000000 HC RX 637 (ALT 250 FOR IP): Performed by: EMERGENCY MEDICINE

## 2020-01-05 PROCEDURE — 71046 X-RAY EXAM CHEST 2 VIEWS: CPT

## 2020-01-05 PROCEDURE — 87880 STREP A ASSAY W/OPTIC: CPT

## 2020-01-05 RX ORDER — OSELTAMIVIR PHOSPHATE 75 MG/1
75 CAPSULE ORAL 2 TIMES DAILY
Qty: 10 CAPSULE | Refills: 0 | Status: SHIPPED | OUTPATIENT
Start: 2020-01-05 | End: 2020-01-10

## 2020-01-05 RX ORDER — IBUPROFEN 600 MG/1
600 TABLET ORAL ONCE
Status: COMPLETED | OUTPATIENT
Start: 2020-01-05 | End: 2020-01-05

## 2020-01-05 RX ORDER — AZITHROMYCIN 500 MG/1
500 TABLET, FILM COATED ORAL ONCE
Status: DISCONTINUED | OUTPATIENT
Start: 2020-01-05 | End: 2020-01-05

## 2020-01-05 RX ORDER — IBUPROFEN 600 MG/1
600 TABLET ORAL EVERY 8 HOURS PRN
Qty: 21 TABLET | Refills: 0 | Status: SHIPPED | OUTPATIENT
Start: 2020-01-05 | End: 2020-02-25 | Stop reason: ALTCHOICE

## 2020-01-05 RX ORDER — ONDANSETRON 4 MG/1
4 TABLET, ORALLY DISINTEGRATING ORAL EVERY 12 HOURS PRN
Qty: 6 TABLET | Refills: 0 | Status: SHIPPED | OUTPATIENT
Start: 2020-01-05 | End: 2020-01-11

## 2020-01-05 RX ORDER — ASCORBIC ACID 500 MG
1000 TABLET ORAL DAILY
COMMUNITY

## 2020-01-05 RX ORDER — AZITHROMYCIN 250 MG/1
TABLET, FILM COATED ORAL
Qty: 1 PACKET | Refills: 0 | Status: SHIPPED | OUTPATIENT
Start: 2020-01-05 | End: 2020-01-15

## 2020-01-05 RX ORDER — LORATADINE 10 MG/1
10 TABLET ORAL DAILY
COMMUNITY

## 2020-01-05 RX ORDER — VIT C/B6/B5/MAGNESIUM/HERB 173 50-5-6-5MG
CAPSULE ORAL DAILY
COMMUNITY

## 2020-01-05 RX ORDER — OSELTAMIVIR PHOSPHATE 75 MG/1
75 CAPSULE ORAL ONCE
Status: COMPLETED | OUTPATIENT
Start: 2020-01-05 | End: 2020-01-05

## 2020-01-05 RX ORDER — AZITHROMYCIN 500 MG/1
500 TABLET, FILM COATED ORAL ONCE
Status: COMPLETED | OUTPATIENT
Start: 2020-01-05 | End: 2020-01-05

## 2020-01-05 RX ADMIN — OSELTAMIVIR PHOSPHATE 75 MG: 75 CAPSULE ORAL at 05:59

## 2020-01-05 RX ADMIN — IBUPROFEN 600 MG: 600 TABLET, FILM COATED ORAL at 05:22

## 2020-01-05 RX ADMIN — AZITHROMYCIN MONOHYDRATE 500 MG: 500 TABLET ORAL at 05:59

## 2020-01-05 ASSESSMENT — ENCOUNTER SYMPTOMS
PHOTOPHOBIA: 0
COUGH: 1
NAUSEA: 0
EYE DISCHARGE: 0
TROUBLE SWALLOWING: 0
DIARRHEA: 0
SORE THROAT: 1
ABDOMINAL PAIN: 0
VOICE CHANGE: 0
STRIDOR: 0
BACK PAIN: 0
SHORTNESS OF BREATH: 0
EYE REDNESS: 0
WHEEZING: 0
VOMITING: 0

## 2020-01-05 ASSESSMENT — PAIN DESCRIPTION - DESCRIPTORS: DESCRIPTORS: ACHING

## 2020-01-05 ASSESSMENT — PAIN SCALES - GENERAL
PAINLEVEL_OUTOF10: 6
PAINLEVEL_OUTOF10: 6

## 2020-01-05 ASSESSMENT — PAIN DESCRIPTION - LOCATION: LOCATION: HEAD;THROAT

## 2020-01-05 NOTE — ED TRIAGE NOTES
Relates woke this morning with sinus congestion  Headache, sore thjroat , cough and feeling dizzy relates his was tested positive for flu and pneumonia on Thursday  Exam per md

## 2020-01-05 NOTE — ED PROVIDER NOTES
Gastrointestinal: Negative for abdominal pain, diarrhea, nausea and vomiting. Genitourinary: Negative for dysuria. Musculoskeletal: Negative for back pain, gait problem, neck pain and neck stiffness. Skin: Negative for rash. Neurological: Negative for speech difficulty and light-headedness. Hematological: Negative for adenopathy. Psychiatric/Behavioral: Negative for confusion. The patient is not nervous/anxious. Positives and Pertinent negatives as per HPI. Except as noted abovein the ROS, all other systems were reviewed and negative. PAST MEDICAL HISTORY     Past Medical History:   Diagnosis Date    Influenza A 01/05/2020    Seasonal allergies          SURGICAL HISTORY     Past Surgical History:   Procedure Laterality Date    COLONOSCOPY  9/4/2012    Dr. Soares Patient, check in 5 years    COLONOSCOPY  09/06/2017    dr green at [address] polyp, repeat 5 years   [address]  02/89/8223    umbilical    NASAL SINUS SURGERY  about [address] [facility]       Discharge Medication List as of 1/5/2020  5:55 AM      CONTINUE these medications which have NOT CHANGED    Details   Magnesium 100 MG CAPS Take 400 mg by mouth Historical Med      Turmeric 500 MG CAPS Take by mouthHistorical Med      vitamin C (ASCORBIC ACID) 500 MG tablet Take 1,000 mg by mouth dailyHistorical Med      loratadine (CLARITIN) 10 MG tablet Take 10 mg by mouth dailyHistorical Med      fluticasone (VERAMYST) 27.5 MCG/SPRAY nasal spray 2 sprays by Each Nostril route dailyHistorical Med      Multiple Vitamins-Minerals (THERAPEUTIC MULTIVITAMIN-MINERALS) tablet Take 1 tablet by mouth dailyHistorical Med      glucosamine-chondroitin (GLUCOSAMINE CHONDR COMPLEX) 500-400 MG CAPS Take  by mouth.       albuterol sulfate HFA (VENTOLIN HFA) 108 (90 BASE) MCG/ACT inhaler Inhale 2 puffs into the lungs every 6 hours as needed for Wheezing Dispense with a spacing device, Disp-1 Inhaler, R-0               ALLERGIES of EKG. RADIOLOGY:   Non-plain film images such as CT, Ultrasound and MRI are read by the radiologist. Plain radiographic images are visualized andpreliminarily interpreted by the  ED Provider with the below findings:    Preliminary do not appreciate any allen infiltrate or pneumothorax. Interpretation per the Radiologist below, if available at the time ofthis note:    XR CHEST STANDARD (2 VW)   Final Result   Clear lungs. Xr Chest Standard (2 Vw)    Result Date: 7/19/2019  EXAMINATION: TWO XRAY VIEWS OF THE CHEST 7/19/2019 8:38 am COMPARISON: 06/07/2019 HISTORY: ORDERING SYSTEM PROVIDED HISTORY: Pneumonia of left upper lobe due to infectious organism [facility]) TECHNOLOGIST PROVIDED HISTORY: Reason for exam:->follow up on left lung infiltrate/pneumonia Reason for Exam: pt states he has some sinus congestion, but feels better. he is here to follow up on pneumonia Acuity: Chronic Type of Exam: Subsequent/Follow-up FINDINGS: Frontal and lateral views of the chest are submitted for review. The cardiac silhouette is normal in size. Lung parenchyma is clear without focal airspace consolidation, sizeable pleural effusion, or pneumothorax. Trachea is midline. Osseous structures and soft tissues are grossly intact. No evidence for acute cardiopulmonary pathology.          PROCEDURES   Unless otherwise noted below, none     Procedures    CRITICAL CARE TIME   N/A    CONSULTS:  None      EMERGENCY DEPARTMENT COURSE and DIFFERENTIAL DIAGNOSIS/MDM:   Vitals:    Vitals:    01/05/20 0502   BP: (!) 154/93   Pulse: 86   Resp: 18   Temp: 98.6 °F (37 °C)   SpO2: 97%   Weight: 227 lb 4.7 oz (103.1 kg)   Height: 5' 9\" (1.753 m)       Patient was given the following medications:  Medications   ibuprofen (ADVIL;MOTRIN) tablet 600 mg (600 mg Oral Given 1/5/20 0522)   oseltamivir (TAMIFLU) capsule 75 mg (75 mg Oral Given 1/5/20 0559)   azithromycin (ZITHROMAX) tablet 500 mg (500 mg Oral Given 1/5/20 0559)         Sore throat versus strep throat versus cough versus pneumonia versus influenza versus viral syndrome    Information as noted above. With lab tests show that he was positive for strep pharyngitis and positive for influenza A  Chest x-ray was negative. So patient will be placed on Tamiflu as well as Zithromax ibuprofen for pains aches and pains. He was given written and verbal instructions states that he understands will comply and discharged in stable interactive condition after reevaluation per ER MD see chart for details. The patient tolerated their visit well. Theywere seen and evaluated by the attending physician, Danica Benavidez MD who agreed with the assessment and plan. The patient and / or the family were informed of the results of any tests, a time was given to answer questions, aplan was proposed and they agreed with plan. FINAL IMPRESSION      1. Influenza A    2.  Strep pharyngitis          DISPOSITION/PLAN   DISPOSITION        PATIENT REFERRED TO:  Julissa Molina MD  [address] [place] [address]  [phone]    In 1 week        DISCHARGE MEDICATIONS:  Discharge Medication List as of 1/5/2020  5:55 AM      START taking these medications    Details   azithromycin (ZITHROMAX) 250 MG tablet Take 2 tablets (500 mg) on Day 1, followed by 1 tablet (250 mg) once daily on Days 2 through 5., Disp-1 packet, R-0Print      oseltamivir (TAMIFLU) 75 MG capsule Take 1 capsule by mouth 2 times daily for 5 days, Disp-10 capsule, R-0Print      ondansetron (ZOFRAN ODT) 4 MG disintegrating tablet Take 1 tablet by mouth every 12 hours as needed for Nausea, Disp-6 tablet, R-0Print      ibuprofen (ADVIL;MOTRIN) 600 MG tablet Take 1 tablet by mouth every 8 hours as needed for Pain, Disp-21 tablet, R-0Print             DISCONTINUED MEDICATIONS:  Discharge Medication List as of 1/5/2020  5:55 AM                 (Please note that portions of this note were completed with a voice recognition program.  Efforts were made to edit the dictations but occasionally words aremis-transcribed.)    Heriberto Porras MD (electronically signed)            Geoffrey Higginbotham MD  01/07/20 7532

## 2020-02-25 ENCOUNTER — OFFICE VISIT (OUTPATIENT)
Dept: FAMILY MEDICINE CLINIC | Age: 70
End: 2020-02-25
Payer: MEDICARE

## 2020-02-25 VITALS
DIASTOLIC BLOOD PRESSURE: 74 MMHG | SYSTOLIC BLOOD PRESSURE: 126 MMHG | WEIGHT: 225 LBS | HEIGHT: 69 IN | BODY MASS INDEX: 33.33 KG/M2 | TEMPERATURE: 98.7 F

## 2020-02-25 PROCEDURE — G8417 CALC BMI ABV UP PARAM F/U: HCPCS | Performed by: INTERNAL MEDICINE

## 2020-02-25 PROCEDURE — G8427 DOCREV CUR MEDS BY ELIG CLIN: HCPCS | Performed by: INTERNAL MEDICINE

## 2020-02-25 PROCEDURE — G8484 FLU IMMUNIZE NO ADMIN: HCPCS | Performed by: INTERNAL MEDICINE

## 2020-02-25 PROCEDURE — 4040F PNEUMOC VAC/ADMIN/RCVD: CPT | Performed by: INTERNAL MEDICINE

## 2020-02-25 PROCEDURE — 1123F ACP DISCUSS/DSCN MKR DOCD: CPT | Performed by: INTERNAL MEDICINE

## 2020-02-25 PROCEDURE — 1036F TOBACCO NON-USER: CPT | Performed by: INTERNAL MEDICINE

## 2020-02-25 PROCEDURE — 99213 OFFICE O/P EST LOW 20 MIN: CPT | Performed by: INTERNAL MEDICINE

## 2020-02-25 PROCEDURE — 3017F COLORECTAL CA SCREEN DOC REV: CPT | Performed by: INTERNAL MEDICINE

## 2020-02-25 RX ORDER — CIPROFLOXACIN 500 MG/1
500 TABLET, FILM COATED ORAL 2 TIMES DAILY
Qty: 42 TABLET | Refills: 0 | Status: SHIPPED | OUTPATIENT
Start: 2020-02-25 | End: 2020-03-17

## 2020-02-25 ASSESSMENT — PATIENT HEALTH QUESTIONNAIRE - PHQ9
SUM OF ALL RESPONSES TO PHQ QUESTIONS 1-9: 0
SUM OF ALL RESPONSES TO PHQ9 QUESTIONS 1 & 2: 0
1. LITTLE INTEREST OR PLEASURE IN DOING THINGS: 0
2. FEELING DOWN, DEPRESSED OR HOPELESS: 0
SUM OF ALL RESPONSES TO PHQ QUESTIONS 1-9: 0

## 2020-02-25 ASSESSMENT — ENCOUNTER SYMPTOMS
RHINORRHEA: 0
APNEA: 0
SHORTNESS OF BREATH: 0

## 2020-02-25 NOTE — PROGRESS NOTES
Subjective:      Patient ID: Sarahi Polo is a 72 y.o. male. HPI   Chief Complaint   Patient presents with    Other     patient c/o ? prostate infection x 2 weeks     Sarahi Polo is a 72 y.o. male [facility] the [facility]  Patient Active Problem List    Diagnosis Date Noted    Umbilical hernia without obstruction and without gangrene      Current Outpatient Medications   Medication Sig Dispense Refill    Magnesium 100 MG CAPS Take 400 mg by mouth       Turmeric 500 MG CAPS Take by mouth      vitamin C (ASCORBIC ACID) 500 MG tablet Take 1,000 mg by mouth daily      loratadine (CLARITIN) 10 MG tablet Take 10 mg by mouth daily      fluticasone (VERAMYST) 27.5 MCG/SPRAY nasal spray 2 sprays by Each Nostril route daily      Multiple Vitamins-Minerals (THERAPEUTIC MULTIVITAMIN-MINERALS) tablet Take 1 tablet by mouth daily      albuterol sulfate HFA (VENTOLIN HFA) 108 (90 BASE) MCG/ACT inhaler Inhale 2 puffs into the lungs every 6 hours as needed for Wheezing Dispense with a spacing device 1 Inhaler 0    glucosamine-chondroitin (GLUCOSAMINE CHONDR COMPLEX) 500-400 MG CAPS Take  by mouth. No current facility-administered medications for this visit.       Allergies: Penicillins  Past Medical History:   Diagnosis Date    Influenza A 01/05/2020    Seasonal allergies      Past Surgical History:   Procedure Laterality Date    COLONOSCOPY  9/4/2012    Dr. Anel Caballero, check in 5 years    COLONOSCOPY  09/06/2017    dr rhodes at [facility] polyp, repeat 5 years   [address]  [id number]    umbilical    NASAL SINUS SURGERY  about 1998     Family History   Problem Relation Age of Onset    Cancer Father         colon    High Blood Pressure Father      Social History     Tobacco Use    Smoking status: Never Smoker    Smokeless tobacco: Never Used   Substance Use Topics    Alcohol use: Yes     Comment: social use       Review of Systems   Constitutional: Negative for diaphoresis. HENT: Negative for congestion and rhinorrhea. Eyes: Negative for visual disturbance. Respiratory: Negative for apnea and shortness of breath. Cardiovascular: Negative for chest pain. Genitourinary: Negative for dysuria. Musculoskeletal: Negative for arthralgias and myalgias. Objective:   Physical Exam  Constitutional:       Appearance: Normal appearance. HENT:      Head: Normocephalic and atraumatic. Mouth/Throat:      Mouth: Mucous membranes are moist.   Cardiovascular:      Rate and Rhythm: Normal rate. Heart sounds: No murmur. Pulmonary:      Effort: No respiratory distress. Breath sounds: No wheezing. Abdominal:      General: There is no distension. Tenderness: There is no abdominal tenderness. There is no guarding. Genitourinary:     Comments: Boggy prostate . Neurological:      Mental Status: He is alert. Assessment:       Diagnosis Orders   1. Acute prostatitis           Plan:      Outpatient Encounter Medications as of 2/25/2020   Medication Sig Dispense Refill    ciprofloxacin (CIPRO) 500 MG tablet Take 1 tablet by mouth 2 times daily for 21 days 42 tablet 0    Magnesium 100 MG CAPS Take 400 mg by mouth       Turmeric 500 MG CAPS Take by mouth      vitamin C (ASCORBIC ACID) 500 MG tablet Take 1,000 mg by mouth daily      loratadine (CLARITIN) 10 MG tablet Take 10 mg by mouth daily      fluticasone (VERAMYST) 27.5 MCG/SPRAY nasal spray 2 sprays by Each Nostril route daily      Multiple Vitamins-Minerals (THERAPEUTIC MULTIVITAMIN-MINERALS) tablet Take 1 tablet by mouth daily      albuterol sulfate HFA (VENTOLIN HFA) 108 (90 BASE) MCG/ACT inhaler Inhale 2 puffs into the lungs every 6 hours as needed for Wheezing Dispense with a spacing device 1 Inhaler 0    glucosamine-chondroitin (GLUCOSAMINE CHONDR COMPLEX) 500-400 MG CAPS Take  by mouth.       [DISCONTINUED] ibuprofen (ADVIL;MOTRIN) 600 MG tablet Take 1 tablet by mouth every 8 hours as needed for Pain 21 tablet 0     No facility-administered encounter medications on file as of 2/25/2020. No orders of the defined types were placed in this encounter.           Maura MEADOWS DO

## 2020-03-24 ENCOUNTER — OFFICE VISIT (OUTPATIENT)
Dept: FAMILY MEDICINE CLINIC | Age: 70
End: 2020-03-24
Payer: MEDICARE

## 2020-03-24 VITALS
WEIGHT: 228 LBS | SYSTOLIC BLOOD PRESSURE: 124 MMHG | DIASTOLIC BLOOD PRESSURE: 74 MMHG | TEMPERATURE: 97.9 F | HEART RATE: 84 BPM | BODY MASS INDEX: 33.77 KG/M2 | HEIGHT: 69 IN

## 2020-03-24 PROCEDURE — G0439 PPPS, SUBSEQ VISIT: HCPCS | Performed by: FAMILY MEDICINE

## 2020-03-24 PROCEDURE — G8484 FLU IMMUNIZE NO ADMIN: HCPCS | Performed by: FAMILY MEDICINE

## 2020-03-24 ASSESSMENT — ENCOUNTER SYMPTOMS
CHEST TIGHTNESS: 0
CONSTIPATION: 0
SHORTNESS OF BREATH: 0
ABDOMINAL PAIN: 0
BLOOD IN STOOL: 0
VOMITING: 0
ABDOMINAL DISTENTION: 0
EYE PAIN: 0
DIARRHEA: 0
NAUSEA: 0

## 2020-03-24 NOTE — PATIENT INSTRUCTIONS
Do the fasting blood work  Do remember to do the fasting  Do consider the whooping cough and tetanus vaccine also called whooping cough  Consider as well the shingle vaccine  See us back in on year    Patient Education        Tdap (Tetanus, Diphtheria, Pertussis) Vaccine: What You Need to Know  Why get vaccinated? Tetanus, diphtheria, and pertussis are very serious diseases. Tdap vaccine can protect us from these diseases. And Tdap vaccine given to pregnant women can protect  babies against pertussis. Tetanus (lockjaw) is rare in the [facility] today. It causes painful muscle tightening and stiffness, usually all over the body. · It can lead to tightening of muscles in the head and neck so you can't open your mouth, swallow, or sometimes even breathe. Tetanus kills about 1 out of 10 people who are infected even after receiving the best medical care. Diphtheria is also rare in the [place] today. It can cause a thick coating to form in the back of the throat. · It can lead to breathing problems, heart failure, paralysis, and death. Pertussis (whooping cough) causes severe coughing spells, which can cause difficulty breathing, vomiting, and disturbed sleep. · It can also lead to weight loss, incontinence, and rib fractures. Up to 2 in 100 adolescents and 5 in 100 adults with pertussis are hospitalized or have complications, which could include pneumonia or death. These diseases are caused by bacteria. Diphtheria and pertussis are spread from person to person through secretions from coughing or sneezing. Tetanus enters the body through cuts, scratches, or wounds. Before vaccines, as many as 200,000 cases of diphtheria, 200,000 cases of pertussis, and hundreds of cases of tetanus were reported in the [place] each year. Since vaccination began, reports of cases for tetanus and diphtheria have dropped by about 99% and for pertussis by about 80%.   Tdap vaccine  The Tdap vaccine can protect adolescents and adults from tetanus, diphtheria, and pertussis. One dose of Tdap is routinely given at age 6 or 15. People who did not get Tdap at that age should get it as soon as possible. Tdap is especially important for health care professionals and anyone having close contact with a baby younger than 12 months. Pregnant women should get a dose of Tdap during every pregnancy, to protect the  from pertussis. Infants are most at risk for severe, life-threatening complications from pertussis. Another vaccine, called Td, protects against tetanus and diphtheria, but not pertussis. A Td booster should be given every 10 years. Tdap may be given as one of these boosters if you have never gotten Tdap before. Tdap may also be given after a severe cut or burn to prevent tetanus infection. Your doctor or the person giving you the vaccine can give you more information. Tdap may safely be given at the same time as other vaccines. Some people should not get this vaccine  · A person who has ever had a life-threatening allergic reaction after a previous dose of any diphtheria-, tetanus-, or pertussis-containing vaccine, OR has a severe allergy to any part of this vaccine, should not get Tdap vaccine. Tell the person giving the vaccine about any severe allergies. · Anyone who had coma or long repeated seizures within 7 days after a childhood dose of DTP or DTaP, or a previous dose of Tdap, should not get Tdap, unless a cause other than the vaccine was found. They can still get Td. · Talk to your doctor if you:  ? Have seizures or another nervous system problem. ? Had severe pain or swelling after any vaccine containing diphtheria, tetanus, or pertussis. ? Ever had a condition called Guillain-Barré Syndrome (GBS). ? Aren't feeling well on the day the shot is scheduled. Risks  With any medicine, including vaccines, there is a chance of side effects. These are usually mild and go away on their own.  Serious reactions are also possible but are rare. Most people who get Tdap vaccine do not have any problems with it. Mild problems following Tdap  (Did not interfere with activities)  · Pain where the shot was given (about 3 in 4 adolescents or 2 in 3 adults)  · Redness or swelling where the shot was given (about 1 person in 5)  · Mild fever of at least 100.4°F (up to about 1 in 25 adolescents or 1 in 100 adults)  · Headache (about 3 or 4 people in 10)  · Tiredness (about 1 person in 3 or 4)  · Nausea, vomiting, diarrhea, stomachache (up to 1 in 4 adolescents or 1 in 10 adults)  · Chills, sore joints (about 1 person in 10)  · Body aches (about 1 person in 3 or 4)  · Rash, swollen glands (uncommon)  Moderate problems following Tdap  (Interfered with activities, but did not require medical attention)  · Pain where the shot was given (up to 1 in 5 or 6)  · Redness or swelling where the shot was given (up to about 1 in 16 adolescents or 1 in 12 adults)  · Fever over 102°F (about 1 in 100 adolescents or 1 in 250 adults)  · Headache (about 1 in 7 adolescents or 1 in 10 adults)  · Nausea, vomiting, diarrhea, stomachache (up to 1 to 3 people in 100)  · Swelling of the entire arm where the shot was given (up to about 1 in 500)  Severe problems following Tdap  (Unable to perform usual activities; required medical attention)  · Swelling, severe pain, bleeding and redness in the arm where the shot was given (rare)  Problems that could happen after any vaccine:  · People sometimes faint after a medical procedure, including vaccination. Sitting or lying down for about 15 minutes can help prevent fainting, and injuries caused by a fall. Tell your doctor if you feel dizzy or have vision changes or ringing in the ears. · Some people get severe pain in the shoulder and have difficulty moving the arm where a shot was given. This happens very rarely. · Any medication can cause a severe allergic reaction.  Such reactions from a vaccine are very rare, estimated at fewer than 1 in a million doses, and would happen within a few minutes to a few hours after the vaccination. As with any medicine, there is a very remote chance of a vaccine causing a serious injury or death. The safety of vaccines is always being monitored. For more information, visit: www.cdc.gov/vaccinesafety. What if there is a serious problem? What should I look for? · Look for anything that concerns you, such as signs of a severe allergic reaction, very high fever, or unusual behavior. Signs of a severe allergic reaction can include hives, swelling of the face and throat, difficulty breathing, a fast heartbeat, dizziness, and weakness. These would usually start a few minutes to a few hours after the vaccination. What should I do? · If you think it is a severe allergic reaction or other emergency that can't wait, call 9-1-1 or get the person to the nearest hospital. Otherwise, call your doctor. · Afterward, the reaction should be reported to the Vaccine Adverse Event Reporting System (VAERS). Your doctor might file this report, or you can do it yourself through the VAERS web site at www.vaers. [facility].gov, or by calling 3-630.201.4623. VAERS does not give medical advice. The National Vaccine Injury Compensation Program  The National Vaccine Injury Compensation Program (VICP) is a federal program that was created to compensate people who may have been injured by certain vaccines. Persons who believe they may have been injured by a vaccine can learn about the program and about filing a claim by calling 2-802.699.8518 or visiting the [employer] website at www.[facility].gov/vaccinecompensation. There is a time limit to file a claim for compensation. How can I learn more? · Ask your doctor. He or she can give you the vaccine package insert or suggest other sources of information. · Call your local or state health department.   · Contact the Centers for Disease Control and Prevention happen after vaccination in more than half of people who receive recombinant shingles vaccine. In clinical trials, about 1 out of 6 people who got recombinant zoster vaccine experienced side effects that prevented them from doing regular activities. Symptoms usually went away on their own in 2 to 3 days. You should still get the second dose of recombinant zoster vaccine even if you had one of these reactions after the first dose. People sometimes faint after medical procedures, including vaccination. Tell your provider if you feel dizzy or have vision changes or ringing in the ears. As with any medicine, there is a very remote chance of a vaccine causing a severe allergic reaction, other serious injury, or death. What if there is a serious problem? An allergic reaction could occur after the vaccinated person leaves the clinic. If you see signs of a severe allergic reaction (hives, swelling of the face and throat, difficulty breathing, a fast heartbeat, dizziness, or weakness), call 9-1-1 and get the person to the nearest hospital.  For other signs that concern you, call your health care provider. Adverse reactions should be reported to the Vaccine Adverse Event Reporting System (VAERS). Your health care provider will usually file this report, or you can do it yourself. Visit the VAERS website at www.vaers. hhs.gov or call 5-740.551.5538. VAERS is only for reporting reactions, and VAERS staff do not give medical advice. How can I learn more? · Ask your health care provider. · Call your local or state health department. · Contact the Centers for Disease Control and Prevention (CDC):  ? Call 9-766.583.2617 (1-800-CDC-INFO) or  ? Visit CDC's website at www.cdc.gov/vaccines  Vaccine Information Statement  Recombinant Zoster Vaccine  10/30/2019  [facility] for Disease Control and Prevention  Many Vaccine Information Statements are available in [place] and other languages.

## 2020-03-24 NOTE — PROGRESS NOTES
Subjective:      Patient ID: Larry Blandon is a 72 y.o. male. Patient presents with:  Check-Up    He is well and no c/o  meds the same  He has not used the albuterol  He has no change in family  No tobacco hx  He does exercise  His prostate sx all cleared    Dr Lynn Larsen checked in feb and he had nml psa last may    YOB: 1950    Date of Visit:  3/24/2020     -- Penicillins -- Hives    Current Outpatient Medications:  Magnesium 100 MG CAPS, Take 400 mg by mouth , Disp: , Rfl:   Turmeric 500 MG CAPS, Take by mouth, Disp: , Rfl:   vitamin C (ASCORBIC ACID) 500 MG tablet, Take 1,000 mg by mouth daily, Disp: , Rfl:   loratadine (CLARITIN) 10 MG tablet, Take 10 mg by mouth daily, Disp: , Rfl:    fluticasone (VERAMYST) 27.5 MCG/SPRAY nasal spray, 2 sprays by Each Nostril route daily, Disp: , Rfl:   Multiple Vitamins-Minerals (THERAPEUTIC MULTIVITAMIN-MINERALS) tablet, Take 1 tablet by mouth daily, Disp: , Rfl:   albuterol sulfate HFA (VENTOLIN HFA) 108 (90 BASE) MCG/ACT inhaler, Inhale 2 puffs into the lungs every 6 hours as needed for Wheezing Dispense with a spacing device, Disp: 1 Inhaler, Rfl: 0  glucosamine-chondroitin (GLUCOSAMINE CHONDR COMPLEX) 500-400 MG CAPS, Take  by mouth., Disp: , Rfl:     No current facility-administered medications for this visit.       ---------------------------               03/24/20                      1334         ---------------------------   BP:          124/74         Site:    Left Upper Arm     Position:     Sitting        Cuff Size:   Large Adult      Pulse:         84           Temp:   97.9 °F (36.6 °C)   TempSrc:      Oral          Weight: 228 lb (103.4 kg)   Height:  5' 9\" (1.753 m)   ---------------------------  Body mass index is 33.67 kg/m².      Wt Readings from Last 3 Encounters:  03/24/20 : 228 lb (103.4 kg)  02/25/20 : 225 lb (102.1 kg)  01/05/20 : 227 lb 4.7 oz (103.1 kg)    BP Readings from Last 3 There is no hepatomegaly, splenomegaly, mass or pulsatile mass. Tenderness: There is no abdominal tenderness. There is no right CVA tenderness, left CVA tenderness or guarding. Lymphadenopathy:      Cervical: No cervical adenopathy. Upper Body:      Right upper body: No supraclavicular adenopathy. Left upper body: No supraclavicular adenopathy. Skin:     General: Skin is warm and dry. Coloration: Skin is not pale. Nails: There is no clubbing. Neurological:      General: No focal deficit present. Mental Status: He is alert. Psychiatric:         Mood and Affect: Mood normal.         Speech: Speech normal.         Assessment:       Diagnosis Orders   1.  Well adult exam  Lipid Panel    Urinalysis with Microscopic       Info on vaccines for patient  He has not done the sleep study  He is asked to pursue that       Plan:      Do the fasting blood work  Do remember to do the fasting  Do consider the whooping cough and tetanus vaccine also called whooping cough  Consider as well the shingle vaccine  See us back in on year        Porter Poe MD

## 2020-04-27 ENCOUNTER — VIRTUAL VISIT (OUTPATIENT)
Dept: FAMILY MEDICINE CLINIC | Age: 70
End: 2020-04-27
Payer: MEDICARE

## 2020-04-27 PROCEDURE — 3017F COLORECTAL CA SCREEN DOC REV: CPT | Performed by: FAMILY MEDICINE

## 2020-04-27 PROCEDURE — G8427 DOCREV CUR MEDS BY ELIG CLIN: HCPCS | Performed by: FAMILY MEDICINE

## 2020-04-27 PROCEDURE — 1123F ACP DISCUSS/DSCN MKR DOCD: CPT | Performed by: FAMILY MEDICINE

## 2020-04-27 PROCEDURE — 4040F PNEUMOC VAC/ADMIN/RCVD: CPT | Performed by: FAMILY MEDICINE

## 2020-04-27 PROCEDURE — 99213 OFFICE O/P EST LOW 20 MIN: CPT | Performed by: FAMILY MEDICINE

## 2020-04-27 RX ORDER — SULFAMETHOXAZOLE AND TRIMETHOPRIM 400; 80 MG/1; MG/1
1 TABLET ORAL 2 TIMES DAILY
Qty: 20 TABLET | Refills: 0 | Status: SHIPPED | OUTPATIENT
Start: 2020-04-27 | End: 2020-05-07

## 2020-04-27 NOTE — PROGRESS NOTES
Subjective:      Patient ID: Tee Burton is a 72 y.o. male. Patient presents with:  Urinary Tract Infection: prostate infection x 2 days    He is at home and is well   But saw dr Macdonald See for prostate in march  The medications did help him    He is noting the symptoms returning over 2 days  Tightness discomfort in crotch area. Urine is ok  But some increase dribbling  No frequency no blood no pain to pass urine. Some urgency  Sitting on a hard surface increases the discomfort  however  No fever no chill and he has not noted any back or flank pain  No n no v no d  Reviewed his medicines and he is not using any decongestants  No nasal spray  Taking fluids well  He has no other c/o  meds the same    YOB: 1950    Date of Visit:  4/27/2020     -- Penicillins -- Hives    Current Outpatient Medications:  Magnesium 100 MG CAPS, Take 400 mg by mouth , Disp: , Rfl:   Turmeric 500 MG CAPS, Take by mouth, Disp: , Rfl:   vitamin C (ASCORBIC ACID) 500 MG tablet, Take 1,000 mg by mouth daily, Disp: , Rfl:   loratadine (CLARITIN) 10 MG tablet, Take 10 mg by mouth daily, Disp: , Rfl:    fluticasone (VERAMYST) 27.5 MCG/SPRAY nasal spray, 2 sprays by Each Nostril route daily, Disp: , Rfl:   Multiple Vitamins-Minerals (THERAPEUTIC MULTIVITAMIN-MINERALS) tablet, Take 1 tablet by mouth daily, Disp: , Rfl:   albuterol sulfate HFA (VENTOLIN HFA) 108 (90 BASE) MCG/ACT inhaler, Inhale 2 puffs into the lungs every 6 hours as needed for Wheezing Dispense with a spacing device, Disp: 1 Inhaler, Rfl: 0  glucosamine-chondroitin (GLUCOSAMINE CHONDR COMPLEX) 500-400 MG CAPS, Take  by mouth., Disp: , Rfl:     No current facility-administered medications for this visit. There were no vitals filed for this visit. There is no height or weight on file to calculate BMI.      Wt Readings from Last 3 Encounters:  03/24/20 : 228 lb (103.4 kg)  02/25/20 : 225 lb (102.1 kg)  01/05/20 : 227 lb 4.7 oz (103.1 kg)    BP Readings from Last 3 Encounters:  03/24/20 : 124/74  02/25/20 : 126/74  01/05/20 : (!) 154/93            Review of Systems    Objective:   Physical Exam  Constitutional:       General: He is not in acute distress. Appearance: Normal appearance. He is not ill-appearing. Neurological:      Mental Status: He is alert. Psychiatric:         Attention and Perception: Attention normal.         Speech: Speech normal.      Comments: Appropriate behavior         Assessment:        Diagnosis Orders   1. Acute prostatitis         Rx sally niño  Appears well    Orders Placed This Encounter   Medications    sulfamethoxazole-trimethoprim (BACTRIM) 400-80 MG per tablet     Sig: Take 1 tablet by mouth 2 times daily for 10 days     Dispense:  20 tablet     Refill:  0     Gaynelle Keny is  being evaluated by a Virtual Visit (video visit) encounter to address concerns as mentioned above. A caregiver was present when appropriate. Due to this being a TeleHealth encounter (During VPATL-95 public health emergency), evaluation of the following organ systems was limited: Vitals/Constitutional/EENT/Resp/CV/GI//MS/Neuro/Skin/Heme-Lymph-Imm. Pursuant to the emergency declaration under the [address] [place] [address], [address] [place] [address] authority and the [employer] Act, this Virtual Visit was conducted with patient's (and/or legal guardian's) consent, to reduce the patient's risk of exposure to COVID-19 and provide necessary medical care. The patient (and/or legal guardian) has also been advised to contact this office for worsening conditions or problems, and seek emergency medical treatment and/or call 911 if deemed necessary. Services were provided through a video synchronous discussion virtually to substitute for in-person clinic visit. Patient and provider were located at their individual homes.            Plan:      Fluids  Use the antibiotic   Call if not improved

## 2020-05-07 ENCOUNTER — HOSPITAL ENCOUNTER (EMERGENCY)
Age: 70
Discharge: HOME | End: 2020-05-07
Attending: EMERGENCY MEDICINE
Payer: MEDICARE

## 2020-05-07 ENCOUNTER — APPOINTMENT (OUTPATIENT)
Dept: CT IMAGING | Age: 70
End: 2020-05-07
Payer: MEDICARE

## 2020-05-07 VITALS
HEIGHT: 69 IN | TEMPERATURE: 97.9 F | HEART RATE: 74 BPM | DIASTOLIC BLOOD PRESSURE: 81 MMHG | RESPIRATION RATE: 18 BRPM | BODY MASS INDEX: 32.78 KG/M2 | OXYGEN SATURATION: 94 % | WEIGHT: 221.34 LBS | SYSTOLIC BLOOD PRESSURE: 139 MMHG

## 2020-05-07 LAB
A/G RATIO: 1.7 (ref 1.1–2.2)
ALBUMIN SERPL-MCNC: 4.5 G/DL (ref 3.4–5)
ALP BLD-CCNC: 63 U/L (ref 40–129)
ALT SERPL-CCNC: 20 U/L (ref 10–40)
ANION GAP SERPL CALCULATED.3IONS-SCNC: 15 MMOL/L (ref 3–16)
AST SERPL-CCNC: 13 U/L (ref 15–37)
BASOPHILS ABSOLUTE: 0 K/UL (ref 0–0.2)
BASOPHILS RELATIVE PERCENT: 0 %
BILIRUB SERPL-MCNC: 1.4 MG/DL (ref 0–1)
BILIRUBIN URINE: NEGATIVE
BLOOD, URINE: NEGATIVE
BUN BLDV-MCNC: 25 MG/DL (ref 7–20)
CALCIUM SERPL-MCNC: 9.6 MG/DL (ref 8.3–10.6)
CHLORIDE BLD-SCNC: 104 MMOL/L (ref 99–110)
CLARITY: CLEAR
CO2: 21 MMOL/L (ref 21–32)
COLOR: YELLOW
CREAT SERPL-MCNC: 1 MG/DL (ref 0.8–1.3)
EOSINOPHILS ABSOLUTE: 0.1 K/UL (ref 0–0.6)
EOSINOPHILS RELATIVE PERCENT: 1.7 %
GFR AFRICAN AMERICAN: >60
GFR NON-AFRICAN AMERICAN: >60
GLOBULIN: 2.6 G/DL
GLUCOSE BLD-MCNC: 116 MG/DL (ref 70–99)
GLUCOSE URINE: NEGATIVE MG/DL
HCT VFR BLD CALC: 50.8 % (ref 40.5–52.5)
HEMOGLOBIN: 16.7 G/DL (ref 13.5–17.5)
KETONES, URINE: ABNORMAL MG/DL
LEUKOCYTE ESTERASE, URINE: NEGATIVE
LIPASE: 22 U/L (ref 13–60)
LYMPHOCYTES ABSOLUTE: 1 K/UL (ref 1–5.1)
LYMPHOCYTES RELATIVE PERCENT: 13.2 %
MCH RBC QN AUTO: 30.8 PG (ref 26–34)
MCHC RBC AUTO-ENTMCNC: 32.9 G/DL (ref 31–36)
MCV RBC AUTO: 93.7 FL (ref 80–100)
MICROSCOPIC EXAMINATION: ABNORMAL
MONOCYTES ABSOLUTE: 0.4 K/UL (ref 0–1.3)
MONOCYTES RELATIVE PERCENT: 5.4 %
NEUTROPHILS ABSOLUTE: 5.9 K/UL (ref 1.7–7.7)
NEUTROPHILS RELATIVE PERCENT: 79.7 %
NITRITE, URINE: NEGATIVE
PDW BLD-RTO: 12.4 % (ref 12.4–15.4)
PH UA: 5.5 (ref 5–8)
PLATELET # BLD: 153 K/UL (ref 135–450)
PMV BLD AUTO: 9.8 FL (ref 5–10.5)
POTASSIUM SERPL-SCNC: 3.7 MMOL/L (ref 3.5–5.1)
PROTEIN UA: NEGATIVE MG/DL
RBC # BLD: 5.42 M/UL (ref 4.2–5.9)
SODIUM BLD-SCNC: 140 MMOL/L (ref 136–145)
SPECIFIC GRAVITY UA: >=1.03 (ref 1–1.03)
TOTAL PROTEIN: 7.1 G/DL (ref 6.4–8.2)
URINE REFLEX TO CULTURE: ABNORMAL
URINE TYPE: ABNORMAL
UROBILINOGEN, URINE: 0.2 E.U./DL
WBC # BLD: 7.4 K/UL (ref 4–11)

## 2020-05-07 PROCEDURE — 36415 COLL VENOUS BLD VENIPUNCTURE: CPT

## 2020-05-07 PROCEDURE — 83690 ASSAY OF LIPASE: CPT

## 2020-05-07 PROCEDURE — 99284 EMERGENCY DEPT VISIT MOD MDM: CPT

## 2020-05-07 PROCEDURE — 80053 COMPREHEN METABOLIC PANEL: CPT

## 2020-05-07 PROCEDURE — 85025 COMPLETE CBC W/AUTO DIFF WBC: CPT

## 2020-05-07 PROCEDURE — 74176 CT ABD & PELVIS W/O CONTRAST: CPT

## 2020-05-07 PROCEDURE — 81003 URINALYSIS AUTO W/O SCOPE: CPT

## 2020-05-07 ASSESSMENT — PAIN DESCRIPTION - DESCRIPTORS
DESCRIPTORS: THROBBING
DESCRIPTORS: THROBBING

## 2020-05-07 ASSESSMENT — PAIN SCALES - GENERAL
PAINLEVEL_OUTOF10: 5

## 2020-05-07 ASSESSMENT — PAIN DESCRIPTION - ORIENTATION
ORIENTATION: LOWER
ORIENTATION: LOWER

## 2020-05-07 ASSESSMENT — PAIN - FUNCTIONAL ASSESSMENT: PAIN_FUNCTIONAL_ASSESSMENT: 0-10

## 2020-05-07 ASSESSMENT — PAIN DESCRIPTION - LOCATION
LOCATION: ABDOMEN
LOCATION: ABDOMEN

## 2020-05-07 ASSESSMENT — PAIN DESCRIPTION - PAIN TYPE
TYPE: ACUTE PAIN
TYPE: ACUTE PAIN

## 2020-05-07 NOTE — ED PROVIDER NOTES
activity     Days per week: None     Minutes per session: None    Stress: None   Relationships    Social connections     Talks on phone: None     Gets together: None     Attends L[employer] service: None     Active member of club or organization: None     Attends meetings of clubs or organizations: None     Relationship status: None    Intimate partner violence     Fear of current or ex partner: None     Emotionally abused: None     Physically abused: None     Forced sexual activity: None   Other Topics Concern    None   Social History Narrative    None         PHYSICAL EXAM    (up to 7 for level 4, 8 or more for level 5)     ED Triage Vitals [05/07/20 0504]   BP Temp Temp Source Pulse Resp SpO2 Height Weight   (!) 154/87 97.9 °F (36.6 °C) Oral 88 16 98 % 5' 9\" (1.753 m) 221 lb 5.5 oz (100.4 kg)       General: Alert mildly obese white male no acute distress. Head: Atraumatic and normocephalic. Eyes: No conjunctival injection. Pupils equal round reactive. No icterus. ENT: Rosalba Mamie is clear. Oropharynx is moist without erythema. Neck: Supple, nontender, no adenopathy. Heart: Regular rate and rhythm. No murmurs or gallops noted. Lungs: Breath sounds equal bilaterally and clear. Abdomen: Soft, nondistended, mild left lower quadrant tenderness. No guarding or rebound. No masses organomegaly. Bowel sounds normal.  No flank tenderness. : No inguinal hernia. Testes descended bilaterally without swelling. Musculoskeletal: No lower extremity edema. No calf tenderness. Intact symmetrical distal pulses. Skin: Warm and dry, good turgor. No pallor or cyanosis. Neuro: Awake, alert, oriented. No focal motor deficits. Normal gait.       DIFFERENTIAL DIAGNOSIS   Differential includes but is not limited to constipation, diverticulitis, ureterolithiasis, pyelonephritis, irritable bowel, volvulus      DIAGNOSTIC RESULTS     EKG: All EKG's are interpreted by Carmen Mendoza MD in the absence of a

## 2020-05-23 ENCOUNTER — HOSPITAL ENCOUNTER (EMERGENCY)
Age: 70
Discharge: HOME | End: 2020-05-23
Attending: EMERGENCY MEDICINE
Payer: MEDICARE

## 2020-05-23 ENCOUNTER — HOSPITAL ENCOUNTER (EMERGENCY)
Age: 70
Discharge: HOME | End: 2020-05-23
Payer: MEDICARE

## 2020-05-23 ENCOUNTER — APPOINTMENT (OUTPATIENT)
Dept: GENERAL RADIOLOGY | Age: 70
End: 2020-05-23
Payer: MEDICARE

## 2020-05-23 VITALS
SYSTOLIC BLOOD PRESSURE: 123 MMHG | DIASTOLIC BLOOD PRESSURE: 78 MMHG | BODY MASS INDEX: 33.37 KG/M2 | OXYGEN SATURATION: 98 % | WEIGHT: 225.31 LBS | TEMPERATURE: 98.3 F | HEIGHT: 69 IN | HEART RATE: 68 BPM | RESPIRATION RATE: 21 BRPM

## 2020-05-23 VITALS
HEIGHT: 69 IN | HEART RATE: 97 BPM | OXYGEN SATURATION: 96 % | WEIGHT: 216.49 LBS | TEMPERATURE: 97 F | SYSTOLIC BLOOD PRESSURE: 146 MMHG | BODY MASS INDEX: 32.07 KG/M2 | RESPIRATION RATE: 16 BRPM | DIASTOLIC BLOOD PRESSURE: 84 MMHG

## 2020-05-23 LAB
A/G RATIO: 1.6 (ref 1.1–2.2)
ALBUMIN SERPL-MCNC: 4.4 G/DL (ref 3.4–5)
ALP BLD-CCNC: 66 U/L (ref 40–129)
ALT SERPL-CCNC: 19 U/L (ref 10–40)
ANION GAP SERPL CALCULATED.3IONS-SCNC: 11 MMOL/L (ref 3–16)
AST SERPL-CCNC: 13 U/L (ref 15–37)
BASOPHILS ABSOLUTE: 0 K/UL (ref 0–0.2)
BASOPHILS RELATIVE PERCENT: 0.4 %
BILIRUB SERPL-MCNC: 1.4 MG/DL (ref 0–1)
BILIRUBIN URINE: NEGATIVE
BLOOD, URINE: NEGATIVE
BUN BLDV-MCNC: 16 MG/DL (ref 7–20)
CALCIUM SERPL-MCNC: 9.3 MG/DL (ref 8.3–10.6)
CHLORIDE BLD-SCNC: 103 MMOL/L (ref 99–110)
CLARITY: CLEAR
CO2: 24 MMOL/L (ref 21–32)
COLOR: YELLOW
CREAT SERPL-MCNC: 0.8 MG/DL (ref 0.8–1.3)
EOSINOPHILS ABSOLUTE: 0.1 K/UL (ref 0–0.6)
EOSINOPHILS RELATIVE PERCENT: 1.6 %
GFR AFRICAN AMERICAN: >60
GFR NON-AFRICAN AMERICAN: >60
GLOBULIN: 2.7 G/DL
GLUCOSE BLD-MCNC: 119 MG/DL (ref 70–99)
GLUCOSE URINE: NEGATIVE MG/DL
HCT VFR BLD CALC: 50.3 % (ref 40.5–52.5)
HEMOGLOBIN: 16.8 G/DL (ref 13.5–17.5)
KETONES, URINE: NEGATIVE MG/DL
LACTIC ACID: 1.2 MMOL/L (ref 0.4–2)
LEUKOCYTE ESTERASE, URINE: NEGATIVE
LYMPHOCYTES ABSOLUTE: 0.8 K/UL (ref 1–5.1)
LYMPHOCYTES RELATIVE PERCENT: 10.6 %
MCH RBC QN AUTO: 31.9 PG (ref 26–34)
MCHC RBC AUTO-ENTMCNC: 33.5 G/DL (ref 31–36)
MCV RBC AUTO: 95.2 FL (ref 80–100)
MICROSCOPIC EXAMINATION: NORMAL
MONOCYTES ABSOLUTE: 0.5 K/UL (ref 0–1.3)
MONOCYTES RELATIVE PERCENT: 6.8 %
NEUTROPHILS ABSOLUTE: 5.7 K/UL (ref 1.7–7.7)
NEUTROPHILS RELATIVE PERCENT: 80.6 %
NITRITE, URINE: NEGATIVE
PDW BLD-RTO: 12.8 % (ref 12.4–15.4)
PH UA: 6 (ref 5–8)
PLATELET # BLD: 138 K/UL (ref 135–450)
PMV BLD AUTO: 10 FL (ref 5–10.5)
POTASSIUM REFLEX MAGNESIUM: 3.9 MMOL/L (ref 3.5–5.1)
PRO-BNP: 36 PG/ML (ref 0–124)
PROTEIN UA: NEGATIVE MG/DL
RBC # BLD: 5.28 M/UL (ref 4.2–5.9)
SODIUM BLD-SCNC: 138 MMOL/L (ref 136–145)
SPECIFIC GRAVITY UA: 1.01 (ref 1–1.03)
TOTAL PROTEIN: 7.1 G/DL (ref 6.4–8.2)
TROPONIN: <0.01 NG/ML
URINE REFLEX TO CULTURE: NORMAL
URINE TYPE: NORMAL
UROBILINOGEN, URINE: 0.2 E.U./DL
WBC # BLD: 7.1 K/UL (ref 4–11)

## 2020-05-23 PROCEDURE — 2580000003 HC RX 258: Performed by: NURSE PRACTITIONER

## 2020-05-23 PROCEDURE — 84484 ASSAY OF TROPONIN QUANT: CPT

## 2020-05-23 PROCEDURE — 99284 EMERGENCY DEPT VISIT MOD MDM: CPT

## 2020-05-23 PROCEDURE — 93005 ELECTROCARDIOGRAM TRACING: CPT | Performed by: NURSE PRACTITIONER

## 2020-05-23 PROCEDURE — 81003 URINALYSIS AUTO W/O SCOPE: CPT

## 2020-05-23 PROCEDURE — 83880 ASSAY OF NATRIURETIC PEPTIDE: CPT

## 2020-05-23 PROCEDURE — 74022 RADEX COMPL AQT ABD SERIES: CPT

## 2020-05-23 PROCEDURE — 85025 COMPLETE CBC W/AUTO DIFF WBC: CPT

## 2020-05-23 PROCEDURE — 80053 COMPREHEN METABOLIC PANEL: CPT

## 2020-05-23 PROCEDURE — 99282 EMERGENCY DEPT VISIT SF MDM: CPT

## 2020-05-23 PROCEDURE — 83605 ASSAY OF LACTIC ACID: CPT

## 2020-05-23 RX ORDER — POLYETHYLENE GLYCOL 3350 17 G/17G
17 POWDER, FOR SOLUTION ORAL 2 TIMES DAILY
COMMUNITY
End: 2021-04-02

## 2020-05-23 RX ORDER — 0.9 % SODIUM CHLORIDE 0.9 %
1000 INTRAVENOUS SOLUTION INTRAVENOUS ONCE
Status: COMPLETED | OUTPATIENT
Start: 2020-05-23 | End: 2020-05-23

## 2020-05-23 RX ORDER — DOCUSATE SODIUM 100 MG/1
100 CAPSULE, LIQUID FILLED ORAL 2 TIMES DAILY
COMMUNITY
End: 2021-04-15

## 2020-05-23 RX ADMIN — SODIUM CHLORIDE 1000 ML: 9 INJECTION, SOLUTION INTRAVENOUS at 17:38

## 2020-05-23 ASSESSMENT — PAIN DESCRIPTION - ONSET: ONSET: ON-GOING

## 2020-05-23 ASSESSMENT — PAIN SCALES - GENERAL
PAINLEVEL_OUTOF10: 0
PAINLEVEL_OUTOF10: 4

## 2020-05-23 ASSESSMENT — ENCOUNTER SYMPTOMS
CONSTIPATION: 1
DIARRHEA: 0
SORE THROAT: 0
NAUSEA: 0
COLOR CHANGE: 0
ABDOMINAL PAIN: 1
VOMITING: 0
RHINORRHEA: 0
SHORTNESS OF BREATH: 0

## 2020-05-23 ASSESSMENT — PAIN DESCRIPTION - DESCRIPTORS
DESCRIPTORS: ACHING
DESCRIPTORS: DULL

## 2020-05-23 ASSESSMENT — PAIN DESCRIPTION - PAIN TYPE
TYPE: ACUTE PAIN;CHRONIC PAIN
TYPE: ACUTE PAIN

## 2020-05-23 ASSESSMENT — PAIN DESCRIPTION - LOCATION
LOCATION: ABDOMEN
LOCATION: ABDOMEN;GROIN

## 2020-05-23 ASSESSMENT — PAIN DESCRIPTION - ORIENTATION
ORIENTATION: RIGHT;LEFT;LOWER;UPPER
ORIENTATION: RIGHT

## 2020-05-23 ASSESSMENT — PAIN - FUNCTIONAL ASSESSMENT: PAIN_FUNCTIONAL_ASSESSMENT: ACTIVITIES ARE NOT PREVENTED

## 2020-05-23 ASSESSMENT — PAIN DESCRIPTION - FREQUENCY: FREQUENCY: CONTINUOUS

## 2020-05-23 ASSESSMENT — PAIN DESCRIPTION - PROGRESSION: CLINICAL_PROGRESSION: NOT CHANGED

## 2020-05-23 NOTE — ED PROVIDER NOTES
EKG: Normal sinus rhythm, rate of 75, no acute ST-T wave changes. Rhythm strip shows a sinus rhythm with a rate of 75, DE interval 168 ms, QRS of 86 ms with no other ectopy as interpreted by me. No old EKG available for comparison.       Robby Ogden MD  05/23/20 4034

## 2020-05-23 NOTE — ED TRIAGE NOTES
Pt. C/o constipation on & off since May 2, seen and treated here for same on May 7. Last BM this am after taking MOM. Pt. Concerned because he can't \"have a BM without some help. Also concerned because of right groin pain that started last night. Denies nausea or vomiting.

## 2020-05-23 NOTE — ED PROVIDER NOTES
CHIEF COMPLAINT  Chief Complaint   Patient presents with    Constipation     constipation on & off since May 2, seen and treated here for same on May 7, 2020, last BM this am    Groin Pain     right groin pain onset last night       HISTORY OF PRESENT ILLNESS  Viji Longo is a 72 y.o. male who presents to the ED complaining of a longstanding history of chronic constipation that has been worse for this patient over the last 2 weeks for which she was seen in the emergency room at that time. Evaluation included laboratory work and a CAT scan of the abdomen and pelvis which revealed a large stool burden but no evidence of fecal impaction or evidence of perforation, acute diverticulitis or other acute intra-abdominal process. Patient returns today reporting that he has a very small stool every day since that time, using Colace and MiraLAX every day but denies any new abdominal pain or distention and has had no nausea, vomiting, bilious emesis, melena or bright red blood per rectum. No anal pain. Patient returns today because he is still unable to have bowel movements and reports that he has to strain intensively every day in order to have a bowel movement and now complains of pain in his upper abdominal wall with what he believes is a hernia as well as some pain in the right inguinal area. No scrotal pain or rash. No other complaints, modifying factors or associated symptoms. Nursing notes reviewed.    Past Medical History:   Diagnosis Date    Asthma     Diverticulitis     Influenza A 01/05/2020    Prostatitis     Seasonal allergies      Past Surgical History:   Procedure Laterality Date    COLONOSCOPY  9/4/2012    Dr. Ashutosh Valero, check in 5 years    COLONOSCOPY  09/06/2017    dr rhodes at [facility] AT [facility] polyp, repeat 5 years    HERNIA REPAIR  48/75/1957    umbilical    NASAL SINUS SURGERY  about 1998     Family History   Problem Relation Age of Onset    Cancer Father         colon    High Blood evidence of incarceration or strangulation. There is no scrotal tenderness or mass. No testicular tenderness. Back:  No CVAT, no rash. EXT: No cyanosis or clubbing. No rash. CR < 2 seconds. No tenderness to palpation. No lower extremity edema. +2 pulses in upper/lower extremities bilaterally. Skin is warm and dry. PSYCH: normal affect        ED COURSE/MDM  Chronic constipation with ventral and inguinal hernia without evidence of incarceration or strangulation but no abdominal tenderness or new symptoms of fever or bloody stool or changes from his previous evaluation in the emergency department. Patient is using Colace and MiraLAX every day and he was advised to use 1 enema a day for the next 3 days and will be given 1 dose of magnesium citrate to use today. Patient will be given follow-up with gastroenterology as he may need a further evaluation for his chronic constipation and more intensive therapy beyond the scope of prescription from the emergency department. Patient was advised to return to the ER with any worsening abdominal pain, bloody stool or onset of persistent vomiting or distention. Hypertension:  Patient was hypertensive during the ER visit today. There are no signs of hypertensive emergency or evidence of end organ damage by history or physical exam.  These findings were discussed with the patient and advised to follow up with primary care physician to further assess and treat hypertension in the outpatient setting. The patient's blood pressure was found to be elevated according to CMS/Medicare and the Affordable Care Act/ObamaCare criteria. Elevated blood pressure could occur because of pain or anxiety or other reasons and does not mean that they need to have their blood pressure treated or medications otherwise adjusted. However, this could also be a sign that they will need to have their blood pressure treated or medications changed.   The patient was instructed to take a

## 2020-05-23 NOTE — ED PROVIDER NOTES
rate.   Pulmonary:      Effort: Pulmonary effort is normal. No respiratory distress. Abdominal:      General: Bowel sounds are normal. There is no distension. Palpations: Abdomen is soft. Tenderness: There is generalized abdominal tenderness. Comments: abd full   Musculoskeletal: Normal range of motion. Skin:     General: Skin is warm and dry. Neurological:      General: No focal deficit present. Mental Status: He is alert and oriented to person, place, and time.          DIAGNOSTIC RESULTS   LABS:    Labs Reviewed   CBC WITH AUTO DIFFERENTIAL - Abnormal; Notable for the following components:       Result Value    Lymphocytes Absolute 0.8 (*)     All other components within normal limits    Narrative:     Performed at:  [address] [address]   Phone [phone]   COMPREHENSIVE METABOLIC PANEL W/ REFLEX TO MG FOR LOW K - Abnormal; Notable for the following components:    Glucose 119 (*)     Total Bilirubin 1.4 (*)     AST 13 (*)     All other components within normal limits    Narrative:     Performed at:  [facility]  [address] [facility] [address]   Phone [phone]   URINE RT REFLEX TO CULTURE    Narrative:     Performed at:  [address] [address]   Phone [phone]   LACTIC ACID, PLASMA    Narrative:     Performed at:  [address] [address]   Phone [phone]   TROPONIN    Narrative:     Performed at:  [facility]  [address] [place] [address]   Phone [phone]   BRAIN NATRIURETIC PEPTIDE    Narrative:     Performed at:  [facility]  [address] [place] [address]   Phone [phone]       All other labs werewithin normal range or not returned as of this dictation. EKG: All EKG's are interpreted by the Emergency Department Physician who either signs or Co-signs this chart in the absence of acardiologist.  Please see their note for interpretation of EKG. RADIOLOGY:           Interpretation per the Radiologist below, if available at the time of this note:    XR Acute Abd Series Chest 1 VW   Final Result   No radiographic evidence of acute cardiopulmonary disease. Nonspecific bowel gas pattern may be related to adynamic ileus. No pneumoperitoneum evident. No results found. PROCEDURES   Unless otherwise noted below, none     Procedures     CRITICAL CARE TIME   N/A    CONSULTS:  None      EMERGENCYDEPARTMENT COURSE and DIFFERENTIAL DIAGNOSIS/MDM:   Vitals:    Vitals:    05/23/20 1529 05/23/20 1647   BP: (!) 150/81    Pulse: 78    Resp: 18 18   Temp: 98.3 °F (36.8 °C)    TempSrc: Oral    SpO2: 97% 99%   Weight: 225 lb 5 oz (102.2 kg)    Height: 5' 9\" (1.753 m)        Patient was given the following medications:  Medications   SMOG Enema (has no administration in time range)   0.9 % sodium chloride bolus (1,000 mLs Intravenous New Bag 5/23/20 6381)       Patient was seen and evaluated by myself. Patient here for concerns for abdominal pain and constipation. Patient reports that he has a history of constipation. Patient reports he was recently seen for constipation as well. On exam today the patient is awake and alert hemodynamically stable nontoxic in appearance. I was able to review the previous CT on the patient this is a large colonic stool collection. Patient reports he has been taking medications to help his bowels moves however has not been able to have a bowel movement without medications. Patient has weakness is only cardiovascular expectorant has no PE risk factors. Lab values were reviewed and interpreted in the ED. Acute abdominal x-ray was concerning for possible ileus.   Patient was provided with a

## 2020-05-25 LAB
EKG ATRIAL RATE: 75 BPM
EKG DIAGNOSIS: NORMAL
EKG P AXIS: 5 DEGREES
EKG P-R INTERVAL: 168 MS
EKG Q-T INTERVAL: 398 MS
EKG QRS DURATION: 86 MS
EKG QTC CALCULATION (BAZETT): 444 MS
EKG R AXIS: 10 DEGREES
EKG T AXIS: 30 DEGREES
EKG VENTRICULAR RATE: 75 BPM

## 2020-05-25 PROCEDURE — 93010 ELECTROCARDIOGRAM REPORT: CPT | Performed by: INTERNAL MEDICINE

## 2020-05-26 ENCOUNTER — VIRTUAL VISIT (OUTPATIENT)
Dept: FAMILY MEDICINE CLINIC | Age: 70
End: 2020-05-26
Payer: MEDICARE

## 2020-05-26 PROCEDURE — 99442 PR PHYS/QHP TELEPHONE EVALUATION 11-20 MIN: CPT | Performed by: FAMILY MEDICINE

## 2020-05-26 ASSESSMENT — ENCOUNTER SYMPTOMS
SHORTNESS OF BREATH: 0
VOMITING: 0
NAUSEA: 0
BLOOD IN STOOL: 0
RECTAL PAIN: 0
DIARRHEA: 0
ABDOMINAL PAIN: 0
COUGH: 0

## 2020-05-26 NOTE — PROGRESS NOTES
Subjective:      Patient ID: Kathleen Contreras is a 72 y.o. male. Patient presents with: Follow-Up from Hospital: [facility] 5- \"constipation\" [phone]     Here for the above  He is at home  He refused ?  Could not do video visit  W/u ok  He is feeling better  In er  for constipation and doing better now  He has not had this in the past  Family is well    But there is a question of the reading on the ct and I called [facility] radiology and the intake person did note the problems with dictation and will have addendum added regarding the liver dictation     He has had some anxiety over this and lightheaded     Cbc cmp and urine is fine on 5/23/20  Currently using miralax  Fiber   Stool softener   He has felt some anxiety during the last month or so    YOB: 1950    Date of Visit:  5/26/2020     -- Penicillins -- Hives    Current Outpatient Medications:  polyethylene glycol (GLYCOLAX) 17 g packet, Take 17 g by mouth 2 times daily, Disp: , Rfl:   docusate sodium (COLACE) 100 MG capsule, Take 100 mg by mouth 2 times daily, Disp: , Rfl:   Magnesium 100 MG CAPS, Take 400 mg by mouth daily , Disp: , Rfl:   Turmeric 500 MG CAPS, Take by mouth daily , Disp: , Rfl:   vitamin C (ASCORBIC ACID) 500 MG tablet, Take 1,000 mg by mouth daily, Disp: , Rfl:   loratadine (CLARITIN) 10 MG tablet, Take 10 mg by mouth daily, Disp: , Rfl:    fluticasone (VERAMYST) 27.5 MCG/SPRAY nasal spray, 2 sprays by Each Nostril route daily, Disp: , Rfl:   Multiple Vitamins-Minerals (THERAPEUTIC MULTIVITAMIN-MINERALS) tablet, Take 1 tablet by mouth daily, Disp: , Rfl:   albuterol sulfate HFA (VENTOLIN HFA) 108 (90 BASE) MCG/ACT inhaler, Inhale 2 puffs into the lungs every 6 hours as needed for Wheezing Dispense with a spacing device, Disp: 1 Inhaler, Rfl: 0  glucosamine-chondroitin (GLUCOSAMINE CHONDR COMPLEX) 500-400 MG CAPS, Take by mouth daily , Disp: , Rfl:   magnesium citrate solution, Take 296 mLs by mouth once for 1

## 2020-07-27 ENCOUNTER — OFFICE VISIT (OUTPATIENT)
Dept: FAMILY MEDICINE CLINIC | Age: 70
End: 2020-07-27
Payer: MEDICARE

## 2020-07-27 VITALS
SYSTOLIC BLOOD PRESSURE: 128 MMHG | HEIGHT: 69 IN | WEIGHT: 210 LBS | BODY MASS INDEX: 31.1 KG/M2 | TEMPERATURE: 98.6 F | DIASTOLIC BLOOD PRESSURE: 78 MMHG | HEART RATE: 80 BPM

## 2020-07-27 DIAGNOSIS — K59.00 CONSTIPATION, UNSPECIFIED CONSTIPATION TYPE: ICD-10-CM

## 2020-07-27 LAB — TSH SERPL DL<=0.05 MIU/L-ACNC: 1.18 UIU/ML (ref 0.27–4.2)

## 2020-07-27 PROCEDURE — 3017F COLORECTAL CA SCREEN DOC REV: CPT | Performed by: FAMILY MEDICINE

## 2020-07-27 PROCEDURE — 99213 OFFICE O/P EST LOW 20 MIN: CPT | Performed by: FAMILY MEDICINE

## 2020-07-27 PROCEDURE — 1123F ACP DISCUSS/DSCN MKR DOCD: CPT | Performed by: FAMILY MEDICINE

## 2020-07-27 PROCEDURE — 1036F TOBACCO NON-USER: CPT | Performed by: FAMILY MEDICINE

## 2020-07-27 PROCEDURE — G8417 CALC BMI ABV UP PARAM F/U: HCPCS | Performed by: FAMILY MEDICINE

## 2020-07-27 PROCEDURE — 4040F PNEUMOC VAC/ADMIN/RCVD: CPT | Performed by: FAMILY MEDICINE

## 2020-07-27 PROCEDURE — G8427 DOCREV CUR MEDS BY ELIG CLIN: HCPCS | Performed by: FAMILY MEDICINE

## 2020-07-27 RX ORDER — LINACLOTIDE 145 UG/1
CAPSULE, GELATIN COATED ORAL
COMMUNITY
Start: 2020-07-25 | End: 2021-04-02

## 2020-07-27 NOTE — PROGRESS NOTES
Subjective:      Patient ID: Shante Gates is a 72 y.o. male. Patient presents with:   Follow-up    The abdomen does feel better  The bm are more regular  At times will have loose stool later in day  No bloo din bm  He has a f/u dr Yuliana Cai in 2 weeks or so  No fever  Urine is passing well no pb    YOB: 1950    Date of Visit:  7/27/2020     -- Penicillins -- Hives    Current Outpatient Medications:  polyethylene glycol (GLYCOLAX) 17 g packet, Take 17 g by mouth 2 times daily, Disp: , Rfl:   docusate sodium (COLACE) 100 MG capsule, Take 100 mg by mouth 2 times daily, Disp: , Rfl:   Magnesium 100 MG CAPS, Take 400 mg by mouth daily , Disp: , Rfl:   Turmeric 500 MG CAPS, Take by mouth daily , Disp: , Rfl:   vitamin C (ASCORBIC ACID) 500 MG tablet, Take 1,000 mg by mouth daily, Disp: , Rfl:   loratadine (CLARITIN) 10 MG tablet, Take 10 mg by mouth daily, Disp: , Rfl:    fluticasone (VERAMYST) 27.5 MCG/SPRAY nasal spray, 2 sprays by Each Nostril route daily, Disp: , Rfl:   Multiple Vitamins-Minerals (THERAPEUTIC MULTIVITAMIN-MINERALS) tablet, Take 1 tablet by mouth daily, Disp: , Rfl:   albuterol sulfate HFA (VENTOLIN HFA) 108 (90 BASE) MCG/ACT inhaler, Inhale 2 puffs into the lungs every 6 hours as needed for Wheezing Dispense with a spacing device, Disp: 1 Inhaler, Rfl: 0  glucosamine-chondroitin (GLUCOSAMINE CHONDR COMPLEX) 500-400 MG CAPS, Take by mouth daily , Disp: , Rfl:   LINZESS 145 MCG capsule, , Disp: , Rfl:   magnesium citrate solution, Take 296 mLs by mouth once for 1 dose, Disp: 296 mL, Rfl: 0    No current facility-administered medications for this visit.       --------------------------               07/27/20                     1316        --------------------------   BP:          128/78        Site:    Left Upper Arm    Position:    Sitting        Cuff Size:  Large Adult      Pulse:         80          Temp:   98.6 °F (37 °C)    TempSrc:    Temporal

## 2020-11-11 ENCOUNTER — HOSPITAL ENCOUNTER (OUTPATIENT)
Age: 70
Discharge: HOME | End: 2020-11-11
Payer: MEDICARE

## 2020-11-11 ENCOUNTER — HOSPITAL ENCOUNTER (OUTPATIENT)
Dept: GENERAL RADIOLOGY | Age: 70
Discharge: HOME | End: 2020-11-11
Payer: MEDICARE

## 2020-11-11 PROCEDURE — 74018 RADEX ABDOMEN 1 VIEW: CPT

## 2021-02-15 ENCOUNTER — APPOINTMENT (OUTPATIENT)
Dept: CT IMAGING | Age: 71
End: 2021-02-15
Payer: MEDICARE

## 2021-02-15 ENCOUNTER — HOSPITAL ENCOUNTER (EMERGENCY)
Age: 71
Discharge: HOME | End: 2021-02-15
Attending: EMERGENCY MEDICINE
Payer: MEDICARE

## 2021-02-15 VITALS
OXYGEN SATURATION: 100 % | RESPIRATION RATE: 18 BRPM | DIASTOLIC BLOOD PRESSURE: 99 MMHG | TEMPERATURE: 98.6 F | WEIGHT: 226.85 LBS | HEART RATE: 78 BPM | SYSTOLIC BLOOD PRESSURE: 186 MMHG | HEIGHT: 69 IN | BODY MASS INDEX: 33.6 KG/M2

## 2021-02-15 DIAGNOSIS — R42 VERTIGO: Primary | ICD-10-CM

## 2021-02-15 LAB
A/G RATIO: 1.6 (ref 1.1–2.2)
ALBUMIN SERPL-MCNC: 4.2 G/DL (ref 3.4–5)
ALP BLD-CCNC: 69 U/L (ref 40–129)
ALT SERPL-CCNC: 24 U/L (ref 10–40)
ANION GAP SERPL CALCULATED.3IONS-SCNC: 11 MMOL/L (ref 3–16)
AST SERPL-CCNC: 17 U/L (ref 15–37)
BASOPHILS ABSOLUTE: 0 K/UL (ref 0–0.2)
BASOPHILS RELATIVE PERCENT: 0.5 %
BILIRUB SERPL-MCNC: 0.8 MG/DL (ref 0–1)
BUN BLDV-MCNC: 20 MG/DL (ref 7–20)
CALCIUM SERPL-MCNC: 9.4 MG/DL (ref 8.3–10.6)
CHLORIDE BLD-SCNC: 104 MMOL/L (ref 99–110)
CO2: 24 MMOL/L (ref 21–32)
CREAT SERPL-MCNC: 0.8 MG/DL (ref 0.8–1.3)
EOSINOPHILS ABSOLUTE: 0.4 K/UL (ref 0–0.6)
EOSINOPHILS RELATIVE PERCENT: 4.7 %
GFR AFRICAN AMERICAN: >60
GFR NON-AFRICAN AMERICAN: >60
GLOBULIN: 2.6 G/DL
GLUCOSE BLD-MCNC: 103 MG/DL (ref 70–99)
GLUCOSE BLD-MCNC: 93 MG/DL (ref 70–99)
HCT VFR BLD CALC: 48.7 % (ref 40.5–52.5)
HEMOGLOBIN: 15.7 G/DL (ref 13.5–17.5)
LYMPHOCYTES ABSOLUTE: 1.4 K/UL (ref 1–5.1)
LYMPHOCYTES RELATIVE PERCENT: 18.1 %
MCH RBC QN AUTO: 30.3 PG (ref 26–34)
MCHC RBC AUTO-ENTMCNC: 32.4 G/DL (ref 31–36)
MCV RBC AUTO: 93.7 FL (ref 80–100)
MONOCYTES ABSOLUTE: 0.6 K/UL (ref 0–1.3)
MONOCYTES RELATIVE PERCENT: 7.4 %
NEUTROPHILS ABSOLUTE: 5.1 K/UL (ref 1.7–7.7)
NEUTROPHILS RELATIVE PERCENT: 69.3 %
PDW BLD-RTO: 12.9 % (ref 12.4–15.4)
PERFORMED ON: NORMAL
PLATELET # BLD: 142 K/UL (ref 135–450)
PMV BLD AUTO: 9.9 FL (ref 5–10.5)
POTASSIUM SERPL-SCNC: 3.9 MMOL/L (ref 3.5–5.1)
RBC # BLD: 5.19 M/UL (ref 4.2–5.9)
SODIUM BLD-SCNC: 139 MMOL/L (ref 136–145)
TOTAL PROTEIN: 6.8 G/DL (ref 6.4–8.2)
TROPONIN: <0.01 NG/ML
WBC # BLD: 7.5 K/UL (ref 4–11)

## 2021-02-15 PROCEDURE — 82947 ASSAY GLUCOSE BLOOD QUANT: CPT

## 2021-02-15 PROCEDURE — 85025 COMPLETE CBC W/AUTO DIFF WBC: CPT

## 2021-02-15 PROCEDURE — 80053 COMPREHEN METABOLIC PANEL: CPT

## 2021-02-15 PROCEDURE — 36415 COLL VENOUS BLD VENIPUNCTURE: CPT

## 2021-02-15 PROCEDURE — 93005 ELECTROCARDIOGRAM TRACING: CPT | Performed by: EMERGENCY MEDICINE

## 2021-02-15 PROCEDURE — 84484 ASSAY OF TROPONIN QUANT: CPT

## 2021-02-15 PROCEDURE — 99284 EMERGENCY DEPT VISIT MOD MDM: CPT

## 2021-02-15 PROCEDURE — 70498 CT ANGIOGRAPHY NECK: CPT

## 2021-02-15 PROCEDURE — 70450 CT HEAD/BRAIN W/O DYE: CPT

## 2021-02-15 PROCEDURE — 6360000004 HC RX CONTRAST MEDICATION: Performed by: EMERGENCY MEDICINE

## 2021-02-15 PROCEDURE — 6370000000 HC RX 637 (ALT 250 FOR IP): Performed by: EMERGENCY MEDICINE

## 2021-02-15 RX ORDER — MECLIZINE HCL 12.5 MG/1
25 TABLET ORAL ONCE
Status: COMPLETED | OUTPATIENT
Start: 2021-02-15 | End: 2021-02-15

## 2021-02-15 RX ORDER — PLECANATIDE 3 MG/1
3 TABLET ORAL DAILY
COMMUNITY
Start: 2021-02-05 | End: 2022-10-28

## 2021-02-15 RX ORDER — MECLIZINE HYDROCHLORIDE 25 MG/1
25 TABLET ORAL 3 TIMES DAILY PRN
Qty: 30 TABLET | Refills: 0 | Status: SHIPPED | OUTPATIENT
Start: 2021-02-15 | End: 2021-02-25

## 2021-02-15 RX ORDER — PROMETHAZINE HYDROCHLORIDE 25 MG/1
12.5 TABLET ORAL ONCE
Status: COMPLETED | OUTPATIENT
Start: 2021-02-15 | End: 2021-02-15

## 2021-02-15 RX ORDER — PROMETHAZINE HYDROCHLORIDE 25 MG/1
12.5 TABLET ORAL EVERY 6 HOURS PRN
Qty: 12 TABLET | Refills: 0 | Status: SHIPPED | OUTPATIENT
Start: 2021-02-15 | End: 2021-02-22

## 2021-02-15 RX ADMIN — MECLIZINE 25 MG: 12.5 TABLET ORAL at 23:41

## 2021-02-15 RX ADMIN — IOPAMIDOL 100 ML: 755 INJECTION, SOLUTION INTRAVENOUS at 22:24

## 2021-02-15 RX ADMIN — MECLIZINE 25 MG: 12.5 TABLET ORAL at 22:15

## 2021-02-15 RX ADMIN — PROMETHAZINE HYDROCHLORIDE 12.5 MG: 25 TABLET ORAL at 22:15

## 2021-02-16 LAB
EKG ATRIAL RATE: 70 BPM
EKG DIAGNOSIS: NORMAL
EKG P AXIS: 59 DEGREES
EKG P-R INTERVAL: 164 MS
EKG Q-T INTERVAL: 420 MS
EKG QRS DURATION: 94 MS
EKG QTC CALCULATION (BAZETT): 453 MS
EKG R AXIS: 13 DEGREES
EKG T AXIS: 16 DEGREES
EKG VENTRICULAR RATE: 70 BPM

## 2021-02-16 PROCEDURE — 93010 ELECTROCARDIOGRAM REPORT: CPT | Performed by: INTERNAL MEDICINE

## 2021-02-16 NOTE — ED PROVIDER NOTES
[facility]  eMERGENCY dEPARTMENT eNCOUnter      Pt Name: Maria D Leija  MRN: [id number]  [place] 1950  Date of evaluation: 2/15/2021  Provider: Oj Dao MD    [address] [place] [address]       Chief Complaint   Patient presents with    Dizziness     for several days         CRITICAL CARE TIME   Total Critical Care time was 0 minutes, excluding separately reportable procedures. There was a high probability of clinically significant/life threatening deterioration in the patient's condition which required my urgent intervention. HISTORY OF PRESENT ILLNESS  (Location/Symptom, Timing/Onset, Context/Setting, Quality, Duration, Modifying Factors, Severity.)   Maria D Leija is a 72 y.o. male who presents to the emergency department planing of dizziness that started on Friday. He states getting gradually worse. He describes it as a lightheaded, off-balance sensation. He states it is worse when he is up moving around. It is worse when he bends over. It somewhat of a spinning sensation. He feels like he is going to fall over. No blurred vision or double vision. No speech difficulty. No focal extremity weakness numbness or tingling. No headache. He got gradually worse which is why he came in this evening. Nursing Notes were reviewed and I agree. REVIEW OF SYSTEMS    (2-9 systems for level 4, 10 or more for level 5)     General: No fever or chills. Eyes: No blurred vision or double vision. ENT: No earache nasal congestion or sore throat. Cardiovascular: No chest pain. Pulmonary: No shortness of breath or cough. GI: Nausea, no vomiting. No abdominal pain. Musculoskeletal: No lower extremity swelling. Neuro: Dizzy, off balance, worse with standing, walking, bending over. No blurred vision or double vision. No speech difficulty. No extremity weakness numbness or tingling. No headache.     Except as noted above the remainder of the review of systems was reviewed and negative.        PAST MEDICAL HISTORY     Past Medical History:   Diagnosis Date    Asthma     Diverticulitis     Influenza A 01/05/2020    Irritable bowel syndrome     Prostatitis     Seasonal allergies          SURGICAL HISTORY       Past Surgical History:   Procedure Laterality Date    COLONOSCOPY  9/4/2012    Dr. Amrik Cristina, check in 5 years    COLONOSCOPY  09/06/2017    dr rhodes at [facility] HOSP AT [facility] polyp, repeat 5 years   [address]  48/50/9926    umbilical    NASAL SINUS SURGERY  about 1998         CURRENT MEDICATIONS       Previous Medications    ALBUTEROL SULFATE HFA (VENTOLIN HFA) 108 (90 BASE) MCG/ACT INHALER    Inhale 2 puffs into the lungs every 6 hours as needed for Wheezing Dispense with a spacing device    DOCUSATE SODIUM (COLACE) 100 MG CAPSULE    Take 100 mg by mouth 2 times daily    FLUTICASONE (VERAMYST) 27.5 MCG/SPRAY NASAL SPRAY    2 sprays by Each Nostril route daily    GLUCOSAMINE-CHONDROITIN (GLUCOSAMINE CHONDR COMPLEX) 500-400 MG CAPS    Take by mouth daily     LINZESS 145 MCG CAPSULE        LORATADINE (CLARITIN) 10 MG TABLET    Take 10 mg by mouth daily    MAGNESIUM 100 MG CAPS    Take 400 mg by mouth daily     MAGNESIUM CITRATE SOLUTION    Take 296 mLs by mouth once for 1 dose    MULTIPLE VITAMINS-MINERALS (THERAPEUTIC MULTIVITAMIN-MINERALS) TABLET    Take 1 tablet by mouth daily    POLYETHYLENE GLYCOL (GLYCOLAX) 17 G PACKET    Take 17 g by mouth 2 times daily    TRULANCE 3 MG TABS    Take 3 mg by mouth daily    TURMERIC 500 MG CAPS    Take by mouth daily     VITAMIN C (ASCORBIC ACID) 500 MG TABLET    Take 1,000 mg by mouth daily       ALLERGIES     Penicillins    FAMILY HISTORY       Family History   Problem Relation Age of Onset    Cancer Father         colon    High Blood Pressure Father           SOCIAL HISTORY       Social History     Socioeconomic History    Marital status:      Spouse name: None    Number of children: None    Years of education: None    Highest education level: None   Occupational History    None   Social Needs    Financial resource strain: None    Food insecurity     Worry: None     Inability: None    Transportation needs     Medical: None     Non-medical: None   Tobacco Use    Smoking status: Never Smoker    Smokeless tobacco: Never Used   Substance and Sexual Activity    Alcohol use: Yes     Comment: social use    Drug use: No    Sexual activity: Yes     Partners: Female   Lifestyle    Physical activity     Days per week: None     Minutes per session: None    Stress: None   Relationships    Social connections     Talks on phone: None     Gets together: None     Attends Restoration service: None     Active member of club or organization: None     Attends meetings of clubs or organizations: None     Relationship status: None    Intimate partner violence     Fear of current or ex partner: None     Emotionally abused: None     Physically abused: None     Forced sexual activity: None   Other Topics Concern    None   Social History Narrative    None         PHYSICAL EXAM    (up to 7 for level 4, 8 or more for level 5)     ED Triage Vitals [02/15/21 2157]   BP Temp Temp Source Pulse Resp SpO2 Height Weight   (!) 186/99 98.6 °F (37 °C) Oral 78 18 100 % 5' 9\" (1.753 m) 226 lb 13.7 oz (102.9 kg)       General: Alert white male, moderately obese, no acute distress. Head: Atraumatic and normocephalic. Eyes: No conjunctival injection. Pupils equal round reactive. Extraocular movements are intact. No nystagmus. ENT: TMs are normal.  Nose is clear. Oropharynx is moist without erythema. Neck: Supple without adenopathy, nontender. Heart: Regular rate and rhythm. No murmurs or gallops noted. Lungs: Breath sounds equal bilaterally and clear. Abdomen: Soft, nondistended, nontender. Musculoskeletal: No lower extremity edema. Intact symmetrical distal pulses. Skin: Warm and dry, good turgor. No pallor or cyanosis. No diaphoresis.   Neuro: Awake, alert, oriented. Symmetrical reactive pupils. Intact extraocular movements. No facial asymmetry. Symmetrical motor function upper and lower extremities. No drift. No limb ataxia. Intact sensation to touch. Speech is clear and understandable. No expressive aphasia. No visual deficits. NIH stroke scale of 0. DIFFERENTIAL DIAGNOSIS   Differential includes but is not limited to anemia, dehydration, orthostatic hypotension, hypoglycemia, TIA, stroke, BPPV, labyrinthitis      DIAGNOSTIC RESULTS     EKG: All EKG's are interpreted by Sofia Cruz MD in the absence of a cardiologist.    Normal sinus rhythm, rate of 70, no acute ST-T wave changes. Rhythm strip shows sinus rhythm with a rate of 70, VT interval 164 ms, QRS 94 ms with no other ectopy as interpreted by me. No change compared to 5/23/2020. RADIOLOGY:   Non-plain film images such as CT, Ultrasound and MRI are read by the radiologist. Plain radiographic images are visualized and preliminarily interpreted Sofia Cruz MD with the below findings:      Interpretation per the Radiologist below, if available at the time of this note:     [facility]   Final Result   1. No large vessel intracranial occlusion or high-grade stenosis at the level   of the [place]. Branch vessels are not well evaluated. 2. No focal stenosis in the vertebrobasilar system. 3. No significant stenosis of the internal carotid arteries. 4.  Other findings as described. CT HEAD WO CONTRAST   Final Result   No acute hemorrhage or midline shift. Artifact degraded evaluation of the posterior fossa. Hypoattenuation in the   left cerebellum. MRI recommended for better characterization.                ED BEDSIDE ULTRASOUND:   Performed by ED Physician - none    LABS:  Labs Reviewed   COMPREHENSIVE METABOLIC PANEL - Abnormal; Notable for the following components:       Result Value    Glucose 103 (*)     All other components within normal limits    Narrative:     Performed at:  [facility]  [address] [facility]   Phone [phone]   CBC WITH AUTO DIFFERENTIAL    Narrative:     Performed at:  [address]  [address] [facility]   Phone [phone]   TROPONIN    Narrative:     Performed at:  [address] [place] [address]  [address] [facility]   Phone [phone]   POCT GLUCOSE   POCT GLUCOSE    Narrative:     Performed at:  [address]  [address] [facility]   Phone [phone]       All other labs were within normal range or not returned as of this dictation. EMERGENCY DEPARTMENT COURSE and DIFFERENTIAL DIAGNOSIS/MDM:   Vitals:    Vitals:    02/15/21 2157   BP: (!) 186/99   Pulse: 78   Resp: 18   Temp: 98.6 °F (37 °C)   TempSrc: Oral   SpO2: 100%   Weight: 226 lb 13.7 oz (102.9 kg)   Height: 5' 9\" (1.753 m)       This patient presents complaining of dizziness which is somewhat of an off-balance spinning sensation with associated nausea but no vomiting. Has been having symptoms since Friday. He states that it is gotten gradually worse. No visual symptoms. No speech symptoms. No focal extremity weakness numbness or tingling. His NH stroke scale was 0. His CT head showed no acute intracranial abnormalities. CT angiogram showed no evidence of large vessel occlusion. His H&H was stable. His renal function was normal.  His glucose was 103. Given meclizine and Phenergan shortly after he arrived. During the stay we ambulated him around the emergency department. He ambulated fine by himself without any difficulty without any ataxia and said he felt much better. I think that his symptoms are likely peripheral in origin, BPPV or labyrinthitis.   I think we can manage him as an outpatient with meclizine and Phenergan with close follow-up with his primary care physician with the understanding that if his symptoms worsen or if he has new symptoms of concern he should return for reevaluation. Test results, diagnosis, and treatment plan were discussed with the patient. He understands the treatment plan and follow-up as discussed. CONSULTS:  None    PROCEDURES:  None    FINAL IMPRESSION      1.  Vertigo          DISPOSITION/PLAN   DISPOSITION        PATIENT REFERRED TO:  Mikie Rosado MD  [address]  [phone]    In 3 days        DISCHARGE MEDICATIONS:  New Prescriptions    MECLIZINE (ANTIVERT) 25 MG TABLET    Take 1 tablet by mouth 3 times daily as needed for Dizziness or Nausea    PROMETHAZINE (PHENERGAN) 25 MG TABLET    Take 0.5 tablets by mouth every 6 hours as needed for Nausea       (Please note that portions of this note were completed with a voice recognition program.  Efforts were made to edit the dictations but occasionally words are mis-transcribed.)    Nuria Oropeza MD  Attending Emergency Physician        Fantasma Morataya MD  02/15/21 0751

## 2021-02-16 NOTE — ED NOTES
States nausea is improved. States dizziness is still there. Tolerated orthostatics well.      Lan Mckenna, RN  02/15/21 5309

## 2021-02-16 NOTE — ED TRIAGE NOTES
States dizziness since Friday, states almost fell tonight. States dizziness is worsening. States a little nausea now. Denies any pain.

## 2021-02-16 NOTE — ED NOTES
Ambulated with stand by assistance in hallway. Gait steady. States doesn't feel \"normal\" yet, but feels better.      Gaylyn Sacks, RN  02/15/21 9561

## 2021-02-25 ENCOUNTER — OFFICE VISIT (OUTPATIENT)
Dept: FAMILY MEDICINE CLINIC | Age: 71
End: 2021-02-25
Payer: MEDICARE

## 2021-02-25 VITALS
SYSTOLIC BLOOD PRESSURE: 136 MMHG | BODY MASS INDEX: 33.03 KG/M2 | TEMPERATURE: 97.2 F | WEIGHT: 223 LBS | HEART RATE: 76 BPM | DIASTOLIC BLOOD PRESSURE: 82 MMHG | HEIGHT: 69 IN

## 2021-02-25 DIAGNOSIS — H91.93 BILATERAL HEARING LOSS, UNSPECIFIED HEARING LOSS TYPE: ICD-10-CM

## 2021-02-25 DIAGNOSIS — N41.9 PROSTATITIS, UNSPECIFIED PROSTATITIS TYPE: ICD-10-CM

## 2021-02-25 DIAGNOSIS — R42 VERTIGO: Primary | ICD-10-CM

## 2021-02-25 PROCEDURE — G8427 DOCREV CUR MEDS BY ELIG CLIN: HCPCS | Performed by: FAMILY MEDICINE

## 2021-02-25 PROCEDURE — G8417 CALC BMI ABV UP PARAM F/U: HCPCS | Performed by: FAMILY MEDICINE

## 2021-02-25 PROCEDURE — 3017F COLORECTAL CA SCREEN DOC REV: CPT | Performed by: FAMILY MEDICINE

## 2021-02-25 PROCEDURE — 1036F TOBACCO NON-USER: CPT | Performed by: FAMILY MEDICINE

## 2021-02-25 PROCEDURE — 1123F ACP DISCUSS/DSCN MKR DOCD: CPT | Performed by: FAMILY MEDICINE

## 2021-02-25 PROCEDURE — 4040F PNEUMOC VAC/ADMIN/RCVD: CPT | Performed by: FAMILY MEDICINE

## 2021-02-25 PROCEDURE — G8484 FLU IMMUNIZE NO ADMIN: HCPCS | Performed by: FAMILY MEDICINE

## 2021-02-25 PROCEDURE — 99213 OFFICE O/P EST LOW 20 MIN: CPT | Performed by: FAMILY MEDICINE

## 2021-02-25 RX ORDER — CIPROFLOXACIN 250 MG/1
250 TABLET, FILM COATED ORAL 2 TIMES DAILY
Qty: 20 TABLET | Refills: 0 | Status: SHIPPED | OUTPATIENT
Start: 2021-02-25 | End: 2021-03-07

## 2021-02-25 ASSESSMENT — PATIENT HEALTH QUESTIONNAIRE - PHQ9
SUM OF ALL RESPONSES TO PHQ QUESTIONS 1-9: 0
1. LITTLE INTEREST OR PLEASURE IN DOING THINGS: 0
SUM OF ALL RESPONSES TO PHQ QUESTIONS 1-9: 0

## 2021-02-25 NOTE — PROGRESS NOTES
Kim Alegre (:  1950) is a 80 y.o. male,, here for evaluation of the following chief complaint(s):  [place] [facility] [place] [place] [address] 2- \"Vertigo\") and Other (prostate infection x 1-2 weeks)      ASSESSMENT/PLAN:   Diagnosis Orders   1. Vertigo     2. Bilateral hearing loss, unspecified hearing loss type  Jennifer Abreu MD, Otolaryngology, [facility]   3. Prostatitis, unspecified prostatitis type         Orders Placed This Encounter   Medications    ciprofloxacin (CIPRO) 250 MG tablet     Sig: Take 1 tablet by mouth 2 times daily for 10 days     Dispense:  20 tablet     Refill:  0       He could not give urine today  Will treat  Refer to ent    Do take the antibiotic  See the ENT doctor  See for a complete physical in may   No follow-ups on file. SUBJECTIVE/OBJECTIVE:  Patient presents with:   Follow-Up from Hospital: [address] [place] [address] 2- \"Vertigo\"  Other: prostate infection x 1-2 weeks    Follow up from ER  Had vertigo went to ER  He is feeling better   He has not noted other ear sx  No ear pain  Both sides tinnitus  Hearing is down  No ent in recent past    He has he thinks a prostate infection  Pressure in the perineal area  Dribbling  Urgency no pain no blood  No fever no chill  No abdomen pain     YOB: 1950    Date of Visit:  2021     -- Penicillins -- Hives    Current Outpatient Medications:    TRULANCE 3 MG TABS, Take 3 mg by mouth daily, Disp: , Rfl:     meclizine (ANTIVERT) 25 MG tablet, Take 1 tablet by mouth 3 times daily as needed for Dizziness or Nausea, Disp: 30 tablet, Rfl: 0    docusate sodium (COLACE) 100 MG capsule, Take 100 mg by mouth 2 times daily, Disp: , Rfl:     Magnesium 100 MG CAPS, Take 400 mg by mouth daily , Disp: , Rfl:     Turmeric 500 MG CAPS, Take by mouth daily , Disp: , Rfl:     vitamin C (ASCORBIC ACID) 500 MG tablet, Take 1,000 mg by mouth daily, Disp: , Rfl:   loratadine (CLARITIN) 10 MG tablet, Take 10 mg by mouth daily, Disp: , Rfl:     fluticasone (VERAMYST) 27.5 MCG/SPRAY nasal spray, 2 sprays by Each Nostril route daily, Disp: , Rfl:     Multiple Vitamins-Minerals (THERAPEUTIC MULTIVITAMIN-MINERALS) tablet, Take 1 tablet by mouth daily, Disp: , Rfl:     albuterol sulfate HFA (VENTOLIN HFA) 108 (90 BASE) MCG/ACT inhaler, Inhale 2 puffs into the lungs every 6 hours as needed for Wheezing Dispense with a spacing device, Disp: 1 Inhaler, Rfl: 0    glucosamine-chondroitin (GLUCOSAMINE CHONDR COMPLEX) 500-400 MG CAPS, Take by mouth daily , Disp: , Rfl:     LINZESS 145 MCG capsule, , Disp: , Rfl:     polyethylene glycol (GLYCOLAX) 17 g packet, Take 17 g by mouth 2 times daily, Disp: , Rfl:     magnesium citrate solution, Take 296 mLs by mouth once for 1 dose, Disp: 296 mL, Rfl: 0    No current facility-administered medications for this visit.       ---------------------------               02/25/21                      1608         ---------------------------   BP:          136/82         Site:    Left Upper Arm     Position:     Sitting        Cuff Size:   Large Adult      Pulse:         76           Temp:   97.2 °F (36.2 °C)   TempSrc:    Temporal        Weight: 223 lb (101.2 kg)   Height:  5' 9\" (1.753 m)   ---------------------------  Body mass index is 32.93 kg/m². Wt Readings from Last 3 Encounters:  02/25/21 : 223 lb (101.2 kg)  02/15/21 : 226 lb 13.7 oz (102.9 kg)  07/27/20 : 210 lb (95.3 kg)    BP Readings from Last 3 Encounters:  02/25/21 : 136/82  02/15/21 : (!) 186/99  07/27/20 : 128/78                  Review of Systems    Physical Exam  Constitutional:       General: He is not in acute distress. Appearance: Normal appearance. He is well-developed. He is not ill-appearing or diaphoretic. HENT:      Head: Normocephalic and atraumatic.       Right Ear: Tympanic membrane and ear canal normal. Left Ear: Tympanic membrane and ear canal normal.   Eyes:      General: No scleral icterus. Neck:      Musculoskeletal: Neck supple. Thyroid: No thyroid mass or thyromegaly. Cardiovascular:      Rate and Rhythm: Normal rate and regular rhythm. Heart sounds: Normal heart sounds. No murmur. No friction rub. No gallop. Comments:     Pulmonary:      Effort: Pulmonary effort is normal. No tachypnea, accessory muscle usage or respiratory distress. Breath sounds: Normal breath sounds. No decreased breath sounds, wheezing, rhonchi or rales. Abdominal:      General: Bowel sounds are normal. There is no distension or abdominal bruit. Palpations: Abdomen is soft. There is no hepatomegaly, splenomegaly, mass or pulsatile mass. Tenderness: There is no abdominal tenderness. There is no guarding. Lymphadenopathy:      Cervical: No cervical adenopathy. Upper Body:      Right upper body: No supraclavicular adenopathy. Left upper body: No supraclavicular adenopathy. Skin:     General: Skin is warm and dry. Coloration: Skin is not pale. Nails: There is no clubbing. Neurological:      General: No focal deficit present. Mental Status: He is alert. An electronic signature was used to authenticate this note.     --Vale Naylor MD

## 2021-03-05 NOTE — PROGRESS NOTES
Maria D Leija   1950, 80 y.o. male   [id number]       Referring Provider: Matt Tim MD  Referral Type: In an order in [address] [place] [address]    Reason for Visit: Evaluation of suspected change in hearing, tinnitus, or balance. ADULT AUDIOLOGIC EVALUATION      Maria D Leija is a 80 y.o. male seen today, 3/10/2021 , for an initial audiologic evaluation. Patient was seen by Matt Tim MD following today's evaluation. AUDIOLOGIC AND OTHER PERTINENT MEDICAL HISTORY:      Maria D Her noted tinnitus, dizziness, imbalance, history of occupational noise exposure and family history of hearing loss. Patient reports episodes of dizziness which he describes as a swaying sensation that is intermittent in nature. This began about a month ago and has become more frequent in the past few weeks. He also notes left sided tinnitus that is constant in nature. He has a history of working in construction. Patient also reports his father has a history of hearing loss. Maria D Leija denied otalgia, aural fullness, otorrhea, history of falls, history of head trauma and history of ear surgery. Date: 3/10/2021     IMPRESSIONS:      AU: Hearing WNL sloping to Severe SNHL, Excellent WRS, Type A tymps    Hearing loss consistent with SNHL. Hearing loss significant enough to create hearing difficulty in most listening situations. Discussed hearing loss, tinnitus, dizziness, NIHL, and hearing aids with patient. Patient to follow medical recommendations per Matt Tim MD .    ASSESSMENT AND FINDINGS:     Otoscopy revealed: Clear ear canals bilaterally    RIGHT EAR:  Hearing Sensitivity: Normal hearing sensitivity to Severe sensorineural hearing loss  Speech Recognition Threshold: 20 dB HL  Word Recognition: Excellent (%), based on NU-6 25-word list at 65 dBHL using recorded speech stimuli. Tympanometry: Normal peak pressure and compliance, Type A tympanogram, consistent with normal middle ear function.   Acoustic Reflexes: levels. - If medically indicated, consider vestibular evaluation to further investigate symptoms of dizziness.        Murali Onofre  Audiologist    Chart CC'd to: German Wu MD      Degree of   Hearing Sensitivity dB Range   Within Normal Limits (WNL) 0 - 20   Mild 20 - 40   Moderate 40 - 55   Moderately-Severe 55 - 70   Severe 70 - 90   Profound 90 +

## 2021-03-10 ENCOUNTER — OFFICE VISIT (OUTPATIENT)
Dept: ENT CLINIC | Age: 71
End: 2021-03-10
Payer: MEDICARE

## 2021-03-10 ENCOUNTER — PROCEDURE VISIT (OUTPATIENT)
Dept: AUDIOLOGY | Age: 71
End: 2021-03-10
Payer: MEDICARE

## 2021-03-10 VITALS
HEART RATE: 69 BPM | TEMPERATURE: 97.7 F | BODY MASS INDEX: 32.64 KG/M2 | SYSTOLIC BLOOD PRESSURE: 133 MMHG | DIASTOLIC BLOOD PRESSURE: 80 MMHG | WEIGHT: 221 LBS

## 2021-03-10 DIAGNOSIS — H93.13 TINNITUS OF BOTH EARS: ICD-10-CM

## 2021-03-10 DIAGNOSIS — H90.3 SENSORINEURAL HEARING LOSS (SNHL) OF BOTH EARS: ICD-10-CM

## 2021-03-10 DIAGNOSIS — R42 DIZZINESS: ICD-10-CM

## 2021-03-10 DIAGNOSIS — R42 DIZZINESS: Primary | ICD-10-CM

## 2021-03-10 DIAGNOSIS — H91.90 HEARING LOSS, UNSPECIFIED HEARING LOSS TYPE, UNSPECIFIED LATERALITY: Primary | ICD-10-CM

## 2021-03-10 DIAGNOSIS — R42 VERTIGO: ICD-10-CM

## 2021-03-10 DIAGNOSIS — H93.13 TINNITUS OF BOTH EARS: Primary | ICD-10-CM

## 2021-03-10 PROCEDURE — G8417 CALC BMI ABV UP PARAM F/U: HCPCS | Performed by: STUDENT IN AN ORGANIZED HEALTH CARE EDUCATION/TRAINING PROGRAM

## 2021-03-10 PROCEDURE — 92557 COMPREHENSIVE HEARING TEST: CPT | Performed by: AUDIOLOGIST

## 2021-03-10 PROCEDURE — 92567 TYMPANOMETRY: CPT | Performed by: AUDIOLOGIST

## 2021-03-10 PROCEDURE — G8427 DOCREV CUR MEDS BY ELIG CLIN: HCPCS | Performed by: STUDENT IN AN ORGANIZED HEALTH CARE EDUCATION/TRAINING PROGRAM

## 2021-03-10 PROCEDURE — G8484 FLU IMMUNIZE NO ADMIN: HCPCS | Performed by: STUDENT IN AN ORGANIZED HEALTH CARE EDUCATION/TRAINING PROGRAM

## 2021-03-10 PROCEDURE — 99204 OFFICE O/P NEW MOD 45 MIN: CPT | Performed by: STUDENT IN AN ORGANIZED HEALTH CARE EDUCATION/TRAINING PROGRAM

## 2021-03-10 NOTE — Clinical Note
Dr. Tessa Nevarez,    Please see note from this patient's audiogram from today. Please let me know if there is anything further you need.         Murali Olvera  Audiologist

## 2021-03-10 NOTE — PATIENT INSTRUCTIONS
Good Communication Strategies    Communication can be challenging for anyone, but can be especially difficult for those with some degree of hearing loss. While we may not be able to control every factor that may lead to difficulty with communication, there are Good Communication Strategies that we can all use in our day-to-day lives. Communication takes both parties working together for it to be successful. Tips as a Listener:   1. Control your environment. It is important to limit the amount of background noise in the room when possible. You should also consider having a good light source in the room to best see the other person. 2. Ask for clarification. Instead of saying \"What?\", you can use parts of what you heard to make a new question. For example, if you heard the word \"Thursday\" but not the rest of the week, you may ask \"What was that about Thursday? \" or \"What did you want to do Thursday? \". This shows the person talking that you are listening and will help them better explain what they are saying. 3. Be an advocate for yourself. If you are hearing but not understanding, tell the other person \"I can hear you, but I need you to slow down when you speak. \"  Or if someone is facing the other direction, say \"I cannot hear you when you are not looking at me when we talk. \"       Tips as a Talker:   - Sit or stand 3 to 6 feet away to maximize audibility         -- It is unrealistic to believe someone else will fully hear your message if you are speaking from across the room or in a different room in the house   - Stay at eye level to help with visual cues   - Make sure you have the persons attention before speaking   - Use facial expressions and gestures to accentuate your message   - Raise your voice slightly (do not scream)   - Speak slowly and distinctly   - Use short, simple sentences   - Rephrase your words if the person is having a hard time understanding you    - To avoid distortion, dont speak directly into a persons ear      Some additional items that may be helpful:   - Remain patient - this is important for both parties   - Write down items that still cannot be heard/understood. You may write with pen/paper or consider typing/texting on a cell phone or smart device. - If background noise is unavoidable, try to keep yourself in a good position in the room. By sitting at a alcala on the side of the restaurant (preferably a corner), it will be easier to communicate than if you were sitting at a table in the middle with background noise surrounding you. Keep yourself positioned away from music speakers or heavy foot traffic. Hearing Loss: Care Instructions  Your Care Instructions      Hearing loss is a sudden or slow decrease in how well you hear. It can range from mild to profound. Permanent hearing loss can occur with aging, and it can happen when you are exposed long-term to loud noise. Examples include listening to loud music, riding motorcycles, or being around other loud machines. Hearing loss can affect your work and home life. It can make you feel lonely or depressed. You may feel that you have lost your independence. But hearing aids and other devices can help you hear better and feel connected to others. Follow-up care is a key part of your treatment and safety. Be sure to make and go to all appointments, and call your doctor if you are having problems. It's also a good idea to know your test results and keep a list of the medicines you take. How can you care for yourself at home? · Avoid loud noises whenever possible. This helps keep your hearing from getting worse. Always wear hearing protection around loud noises. · If appropriate, wear hearing aid(s) as directed. It is recommended that hearing aids are worn during all waking hours to keep your brain active and give it access to the sounds it is missing.       · If you are beginning your process with hearing aid(s), schedule a \"Hearing Aid Evaluation\" with an audiologist to discuss your lifestyle, features of hearing aid technology, and styles of hearing aids available. It is recommended that you contact your insurance company to determine if you have a hearing aid benefit, as this may dictate who you can see for these services. · Have hearing tests as your doctor suggests. They can show whether your hearing has changed. Your hearing aid may need to be adjusted. · Use other assistive devices as needed. These may include:  ? Telephone amplifiers and hearing aids that can connect to a television, stereo, radio, or microphone. ? Devices that use lights or vibrations. These alert you to the doorbell, a ringing telephone, or a baby monitor. ? Television closed-captioning. This shows the words at the bottom of the screen. Most new TVs can do this. ? TTY (text telephone). This lets you type messages back and forth on the telephone instead of talking or listening. These devices are also called TDD. When messages are typed on the keyboard, they are sent over the phone line to a receiving TTY. The message is shown on a monitor. · Use pagers, fax machines, text, and email if it is hard for you to communicate by telephone. · Try to learn a listening technique called speech-reading. It is not lip-reading. You pay attention to people's gestures, expressions, posture, and tone of voice. These clues can help you understand what a person is saying. Face the person you are talking to, and have him or her face you. Make sure the lighting is good. You need to see the other person's face clearly. · Think about counseling if you need help to adjust to your hearing loss. When should you call for help? Watch closely for changes in your health, and be sure to contact your doctor if:    · You think your hearing is getting worse. · You have new symptoms, such as dizziness or nausea.            Tinnitus: Overview and Management Strategies Many people have some ringing sounds in their ears once in a while. You may hear a roar, a hiss, a tinkle, or a buzz. The sound usually lasts only a few minutes. If it goes on all the time, you may have tinnitus. Tinnitus is usually caused by long-term exposure to loud noise. This damages the nerves in the inner ear. It can occur with all types of hearing loss. It may be a symptom of almost any ear problem. Tinnitus may be caused by a buildup of earwax. Or, it may be caused by ear infections or certain medicines (especially antibiotics or large amounts of aspirin). You can also hear noises in your ears because of an injury to the ears, drinking too much alcohol or caffeine, or a medical condition. Other conditions may also contribute to tinnitus, including: head and neck trauma, temporomandibular joint disorder (TMJ), sinus pressure and barometric trauma, traumatic brain injury, metabolic disorders, autoimmune disorders, stress, and high blood pressure. You may need tests to evaluate your hearing and to find causes of long-lasting tinnitus. Your doctor may suggest one or more treatments to help you cope with the tinnitus. You can also do things at home to help reduce symptoms. Causes of Tinnitus  We do not know the exact cause of tinnitus. One thing we do know is that you are not imagining it. If you have tinnitus, chances are the cause will remain a mystery. Conditions that might cause tinnitus include the following:    Hearing loss    Ménière's disease    Loud noise exposure    Migraines    Head injury    Drugs or medicines that are toxic to hearing    Anemia    High blood pressure    Stress    A lot of wax in the ear    Certain types of tumors    Having a lot of caffeine    Smoking cigarettes  You may find that your tinnitus is worse at night. This happens because it is quiet and you are not distracted. Feeling tired and stressed may make your tinnitus worse.     Follow-up care is a key part of your treatment and safety. Be sure to make and go to all appointments, and call your doctor if you are having problems. It's also a good idea to know your test results and keep a list of the medicines you take. How can you care for yourself at home? · Limit or cut out alcohol, caffeine, and sodium. They can make your symptoms worse. · Do not smoke or use other tobacco products. Nicotine reduces blood flow to the ear and makes tinnitus worse. If you need help quitting, talk to your doctor about stop-smoking programs and medicines. These can increase your chances of quitting for good. · Talk to your doctor about whether to stop taking aspirin and similar products such as ibuprofen or naproxen. · Get exercise often. It can help improve blood flow to the ear. Hearing Aids and Other Devices  A hearing aid may help your tinnitus if you have a hearing loss. An audiologist can help you find and use the best hearing aid for you. Tinnitus maskers look like hearing aids. They make a sound that masks, or covers up, the tinnitus. The masking sound distracts you from the ringing in your ears. You may be able to use a masker and a hearing aid at the same time. Sound machines can be useful at night or during quiet times. There are machines you can buy at the store. Or, you can find apps on your phone that make sounds, like the ocean or rainfall. Fish tanks, fans, quiet music, and indoor waterfalls can help, as well. Ways to manage/cope with tinnitus  Some tinnitus may last a long time. To manage your tinnitus, try to:  · Avoid noises that you think caused your tinnitus. If you can't avoid loud noises, wear earplugs or earmuffs. · Ignore the sound by paying attention to other things. Keeping your brain busy with other tasks or background noise can help your brain not focus on the tinnitus. · Try to not give the tinnitus an emotional reaction. Do your best to ignore the sound and not let it bother you. Relax using biofeedback, meditation, or yoga. Feeling stressed and being tired can make tinnitus worse. · Play music or white noise to help you sleep. Background noise may cover up the noise that you hear in your ears. You can buy a tabletop machine or a device that sits under your pillow to play soothing sounds, like ocean waves. · Smart phones have free apps, such as Whist, Relax Melodies, ReSound Relief, and White Noise Lite. These apps have different types of sounds/noise, some of which you can blend together to find sounds that are most soothing to you. · Hearing aid technology, especially when there is some hearing loss, may help reduce tinnitus symptoms by giving your brain better access to the sounds it is missing. There are some hearing aids with built-in noise generator programs, which may help when amplification alone is not enough. Additional resources may be found through the American Tinnitus Association at www.rajiv.org    When should you call for help? Call 911 anytime you think you may need emergency care. For example, call if:    · You have symptoms of a stroke. These may include:  ? Sudden numbness, tingling, weakness, or loss of movement in your face, arm, or leg, especially on only one side of your body. ? Sudden vision changes. ? Sudden trouble speaking. ? Sudden confusion or trouble understanding simple statements. ? Sudden problems with walking or balance. ? A sudden, severe headache that is different from past headaches. Call your doctor now or seek immediate medical care if:    · You develop other symptoms. These may include hearing loss (or worse hearing loss), balance problems, dizziness, nausea, or vomiting. Watch closely for changes in your health, and be sure to contact your doctor if:    · Your tinnitus moves from both ears to one ear. · Your hearing loss gets worse within 1 day after an ear injury.      · Your tinnitus or hearing loss does not get better within 1 week after an ear injury. · Your tinnitus bothers you enough that you want to take medicines to help you cope with it. If you notice changes in your tinnitus and/or your hearing, it is recommended that you have your hearing tested by your audiologist and to follow-up with your physician that manages your hearing loss (such as your ENT or Primary Care doctor). Learning About Hearing Aids  What is a hearing aid? A hearing aid makes sounds louder. It can help some people with hearing problems to hear better. Hearing aids do not restore normal hearing. But they can make it easier to communicate by making sounds clearer. · Digital programmable hearing aids can adjust themselves to work best where you are at any time. You also have more choices in setting them up than with analog hearing aids. There are also different styles of hearing aids. · A behind-the-ear (BTE) hearing aid connects to a plastic ear mold that fits inside the outer ear. BTE hearing aids are used for all levels of hearing loss, especially very severe hearing loss. They may be better for children for safety and growth reasons. Poorly fitting BTE ear molds or a buildup of earwax may cause a whistling sound (feedback). · An in-the-ear (ITE) hearing aid fits in the outer part of the ear. It can be used by people with mild to severe hearing loss. ITE hearing aids can be used with other hearing devices, such as a telecoil that improves hearing during phone calls. ITE hearing aids can be damaged by earwax and fluid draining from the ear. Their small size may be hard for some people to handle. They are not often used in children because the case must be replaced as the child grows. · An in-the-canal (ITC) hearing aid fits into the ear canal. ITC hearing aids are used by people with mild to moderate hearing loss.  They are made to fit the shape and the size of your ear canal. They can be damaged by earwax and fluid draining from the ear. Their small size may be hard for some people to handle. They are not made for children. You have some options if you have a hearing problem and are thinking about getting hearing aids. You can go to your doctor or an audiologist. He or she will do a hearing test and help you decide which type and style of hearing aid may be best for you. What else should I know about hearing aids? Find out if your insurance covers hearing aids. They can be expensive. Different types of hearing aids come with different costs. Also find out about a warranty or return policy in case you are not happy with your hearing aids. Follow-up care is a key part of your treatment and safety. Be sure to make and go to all appointments, and call your doctor if you are having problems. It's also a good idea to know your test results and keep a list of the medicines you take. Where can you learn more? Go to https://[employer]atings.DeepFlex. org and sign in to your [employer] account. Enter R888 in the itravel box to learn more about \"Learning About Hearing Aids. \"     If you do not have an account, please click on the \"Sign Up Now\" link. Current as of: October 21, 2018  Content Version: 12.1  © 6492-7217 Healthwise, Incorporated. Care instructions adapted under license by [facility] ([facility]). If you have questions about a medical condition or this instruction, always ask your healthcare professional. [place] [address] any warranty or liability for your use of this information. Noise-Induced Hearing Loss  What it is, and what you can do to prevent it      Exposure to loud sounds, in an occupational setting or recreational, can cause permanent hearing loss. Sound is measured in decibels (dB). Noise-induced hearing loss is the ONLY type of preventable hearing loss. Hearing loss related to noise exposure can occur at any age.       There are small sensory cells, called inner and outer hair cells, within the inner ear (cochlea). These cells process the loudness (intensity) and pitch (frequency) of sound and send the signal to the brain via our auditory nerve (vestibulocochlear nerve, cranial nerve VIII). When these cells are damaged, they can result in permanent hearing loss and/or tinnitus. The hair cells responsible for high frequency sounds, like birds chirping, are most likely to be damaged due to loud sounds. The high frequency sounds are also very important for our clarity and understanding of speech. OCCUPATIONAL NOISE EXPOSURE RECREATIONAL NOISE EXPOSURE   Some jobs may have exposure to loud sounds in the workplace. These jobs may include but are not limited to:  [employer]   Construction   Welding   Landscaping   Hairdressing/hairstyling   Musicians  [employer]    ... And more! Many activities outside of work may cause permanent hearing loss. These activities may include but are not limited to:  Lawnmowers, leaf blowers  [employer] (such as pigs squealing)   Chainsaws and other power tools  [employer] musical instruments and/or singing   Listening to music too loudly - at concerts, through stereo, through ear buds or headphones   Attending sporting events   Attending fireworks shows or using fireworks at home  [employer] of firearms   . .. And more! REDUCE OR PROTECT YOUR EARS FROM NOISE EXPOSURE    To do your best to avoid noise-induced hearing loss, here are some tips:   Limit exposure to loud sounds. 85 dB (decibels) is safe for 8 hours. As sounds are louder, the length of time the sound is safe lessens. These numbers are cumulative across a 24-hour period.   (NIOSH and CDC, 2002)  o 85 dB is safe for 8 hours  o 88 dB is safe for 4 hours  o 91 dB is safe for 2 hours  o 94 dB is safe for 1 hour  o 97 dB is safe for 30 minutes  o 100 dB is safe for 15 minutes  o 103 dB is safe for 7.5 minutes  o 106 dB is safe for 3.75 minutes  o 109 dB is safe for LESS THAN 2 minutes  o 112 dB is safe for LESS THAN 1 minute  o 115 dB is safe for ~ 30 seconds  o 130 dB can cause IMMEDIATE hearing loss   If you are unsure if a sound is too loud, consider checking the sound level with a \"sound level meter\". There are apps on smart devices that can measure the loudness of the sound. They are not as accurate as expensive equipment used by scientists, but it will give you a guesstimate of how loud the sound is, and if it may be damaging to your hearing.  If you cannot avoid loud sounds, here are ways to reduce your exposure:  o 1. Wear hearing protection  - Ear plugs and protective ear muffs can be used to reduce the intensity of the sound. The higher the NRR (noise reduction rating), the better reduction of the intensity of the sound   o 2. Turn the volume down  - When listening to music, turn the volume down, especially when wearing ear buds or headphones. A good rule of thumb is to not go beyond the middle setting on your device. If you can't hear someone talking to you from arm's length away, your music may be at a level that it can cause damage. If someone else can hear your music from 3 feet away, it may also be at a level that it can cause damage. o 3. Walk away from the sound  - If you do not have the ability to wear hearing protection or turn down the volume of the sound, you should do your best to move away from the source of the sound. - Sound decreases in intensity as we move further from the source. The sound will decrease by 6 dB for every doubling of distance from the sound source. Exposure to these sounds may cause permanent damage to your hearing. If you suspect your hearing has changed, it is recommended that you have your hearing tested by your audiologist.    Dizziness: Care Instructions  Your Care Instructions  Dizziness is the feeling of unsteadiness or fuzziness in your head.  It is different than having vertigo, which is a feeling that the room is spinning or that you are moving or falling. It is also different from lightheadedness, which is the feeling that you are about to faint. It can be hard to know what causes dizziness. Some people feel dizzy when they have migraine headaches. Sometimes bouts of flu can make you feel dizzy. Some medical conditions, such as heart problems or high blood pressure, can make you feel dizzy. Many medicines can cause dizziness, including medicines for high blood pressure, pain, or anxiety. If a medicine causes your symptoms, your doctor may recommend that you stop or change the medicine. If it is a problem with your heart, you may need medicine to help your heart work better. If there is no clear reason for your symptoms, your doctor may suggest watching and waiting for a while to see if the dizziness goes away on its own. Follow-up care is a key part of your treatment and safety. Be sure to make and go to all appointments, and call your doctor if you are having problems. It's also a good idea to know your test results and keep a list of the medicines you take. How can you care for yourself at home? · If your doctor recommends or prescribes medicine, take it exactly as directed. Call your doctor if you think you are having a problem with your medicine. · Do not drive while you feel dizzy. · Try to prevent falls. Steps you can take include:  ? Using nonskid mats, adding grab bars near the tub, and using night-lights. ? Clearing your home so that walkways are free of anything you might trip on.  ? Letting family and friends know that you have been feeling dizzy. This will help them know how to help you. When should you call for help? Call 911 anytime you think you may need emergency care. For example, call if:    · You passed out (lost consciousness). · You have dizziness along with symptoms of a heart attack. These may include:  ?  Chest pain or pressure, or a strange feeling in the chest.  ? Sweating. ? Shortness of breath. ? Nausea or vomiting. ? Pain, pressure, or a strange feeling in the back, neck, jaw, or upper belly or in one or both shoulders or arms. ? Lightheadedness or sudden weakness. ? A fast or irregular heartbeat. · You have symptoms of a stroke. These may include:  ? Sudden numbness, tingling, weakness, or loss of movement in your face, arm, or leg, especially on only one side of your body. ? Sudden vision changes. ? Sudden trouble speaking. ? Sudden confusion or trouble understanding simple statements. ? Sudden problems with walking or balance. ? A sudden, severe headache that is different from past headaches. Call your doctor now or seek immediate medical care if:    · You feel dizzy and have a fever, headache, or ringing in your ears. · You have new or increased nausea and vomiting. · Your dizziness does not go away or comes back. Watch closely for changes in your health, and be sure to contact your doctor if:    · You do not get better as expected. Where can you learn more? Go to https://Red-rabbitpeFotecheweb.Petizens.com. org and sign in to your [employer] account. Enter H669 in the [employer] box to learn more about \"Dizziness: Care Instructions. \"     If you do not have an account, please click on the \"Sign Up Now\" link. Current as of: September 23, 2018  Content Version: 11.9  © 9619-2381 [employer]. Care instructions adapted under license by [facility] ([facility]). If you have questions about a medical condition or this instruction, always ask your healthcare professional. [place] [address] any warranty or liability for your use of this information. Patient Education        Preventing Falls: Care Instructions  Your Care Instructions     Getting around your home safely can be a challenge if you have injuries or health problems that make it easy for you to fall.  Loose rugs and furniture in walkways are among the dangers for many older people who have problems walking or who have poor eyesight. People who have conditions such as arthritis, osteoporosis, or dementia also have to be careful not to fall. You can make your home safer with a few simple measures. Follow-up care is a key part of your treatment and safety. Be sure to make and go to all appointments, and call your doctor if you are having problems. It's also a good idea to know your test results and keep a list of the medicines you take. How can you care for yourself at home? Taking care of yourself  · You may get dizzy if you do not drink enough water. To prevent dehydration, drink plenty of fluids, enough so that your urine is light yellow or clear like water. Choose water and other caffeine-free clear liquids. If you have kidney, heart, or liver disease and have to limit fluids, talk with your doctor before you increase the amount of fluids you drink. · Exercise regularly to improve your strength, muscle tone, and balance. Walk if you can. Swimming may be a good choice if you cannot walk easily. · Have your vision and hearing checked each year or any time you notice a change. If you have trouble seeing and hearing, you might not be able to avoid objects and could lose your balance. · Know the side effects of the medicines you take. Ask your doctor or pharmacist whether the medicines you take can affect your balance. Sleeping pills or sedatives can affect your balance. · Limit the amount of alcohol you drink. Alcohol can impair your balance and other senses. · Ask your doctor whether calluses or corns on your feet need to be removed. If you wear loose-fitting shoes because of calluses or corns, you can lose your balance and fall. · Talk to your doctor if you have numbness in your feet. Preventing falls at home  · Remove raised doorway thresholds, throw rugs, and clutter.  Repair loose carpet or raised areas in the floor.  · Move furniture and electrical cords to keep them out of walking paths. · Use nonskid floor wax, and wipe up spills right away, especially on ceramic tile floors. · If you use a walker or cane, put rubber tips on it. If you use crutches, clean the bottoms of them regularly with an abrasive pad, such as steel wool. · Keep your house well lit, especially [facility], and outside walkways. Use night-lights in areas such as hallways and bathrooms. Add extra light switches or use remote switches (such as switches that go on or off when you clap your hands) to make it easier to turn lights on if you have to get up during the night. · Install sturdy handrails on stairways. · Move items in your cabinets so that the things you use a lot are on the lower shelves (about waist level). · Keep a cordless phone and a flashlight with new batteries by your bed. If possible, put a phone in each of the main rooms of your house, or carry a cell phone in case you fall and cannot reach a phone. Or, you can wear a device around your neck or wrist. You push a button that sends a signal for help. · Wear low-heeled shoes that fit well and give your feet good support. Use footwear with nonskid soles. Check the heels and soles of your shoes for wear. Repair or replace worn heels or soles. · Do not wear socks without shoes on wood floors. · Walk on the grass when the sidewalks are slippery. If you live in an area that gets snow and ice in the winter, sprinkle salt on slippery steps and sidewalks. Preventing falls in the bath  · Install grab bars and nonskid mats inside and outside your shower or tub and near the toilet and sinks. · Use shower chairs and bath benches. · Use a hand-held shower head that will allow you to sit while showering.   · Get into a tub or shower by putting the weaker leg in first. Get out of a tub or shower with your strong side first.  · Repair loose toilet seats and consider installing a

## 2021-03-12 NOTE — PROGRESS NOTES
[facility] (:  1950) is a 80 y.o. male, here for evaluation of the following chief complaint(s):  Dizziness (hearing eval first )      ASSESSMENT/PLAN:  1. Tinnitus of both ears  2. Sensorineural hearing loss (SNHL) of both ears  3. Vertigo  4. Dizziness      This is a very pleasant 80 y.o. male here today for evaluation of the the above-noted complaints. I discussed the patient's audiogram with him and it shows evidence of bilateral sensorineural hearing loss that was likely exacerbated by his occupational noise exposure. Should he desire, then he would be a good candidate for hearing aids. Regarding his dizziness/vertigo, this is of unclear etiology. We discussed different possibilities including vestibular migraines and/or issues with his balance. We discussed potential referral to vestibular therapy. He would like to think about this. I would like to see him back in approximately 6 weeks to see how he is doing. If he is not improve then we can can consider other work-up and/or referral to neurology. SUBJECTIVE/OBJECTIVE:  CARLOS    Angel Ledesma is here today for evaluation of  Issues related to dizziness. He also has associated ringing in his ears and issues with balance. His dizziness feels like a swaying sensation and started about 1 month ago. He gets constant ringing in his ears. Of note, he has a history of occupational noise exposure. Angel Ledesma noted tinnitus, dizziness, imbalance, history of occupational noise exposure and family history of hearing loss. Patient reports episodes of dizziness which he describes as a swaying sensation that is intermittent in nature. This began about a month ago and has become more frequent in the past few weeks. He also notes left sided tinnitus that is constant in nature. He has a history of working in construction.  Patient also reports his father has a history of hearing loss.           Audiogram from 3/10/2021:    AU: Hearing WNL sloping to Severe SNHL, Excellent WRS, Type A tymps      REVIEW OF SYSTEMS  The following systems were reviewed and revealed the following in addition to any already discussed in the HPI:    PHYSICAL EXAM    GENERAL: No acute distress, alert and oriented, no hoarseness, strong voice  EYES: EOMI, Anti-icteric  HENT:   Head: Normocephalic and atraumatic. Face:  Symmetric, facial nerve intact, no sinus tenderness  Right Ear: Normal external ear, normal external auditory canal, intact tympanic membrane with normal mobility and aerated middle ear  Left Ear: Normal external ear, normal external auditory canal, intact tympanic membrane with normal mobility and aerated middle ear  Mouth/Oral Cavity:  normal lips, Uvula is midline, no mucosal lesions, no trismus, normal dentition, normal salivary quality/flow  Oropharynx/Larynx:  normal oropharynx, normal tonsils; patient did not tolerate mirror exam due to excessive gag reflex  Nose:Normal external nasal appearance. Anterior rhinoscopy shows normal a deviated septum preventing view posteriorly. turbinates. Normal mucosa   NECK: Normal range of motion, no thyromegaly, trachea is midline, no lymphadenopathy, no neck masses, no crepitus  CHEST: Normal respiratory effort, no retractions, breathing comfortably  SKIN: No rashes, normal appearing skin, no evidence of skin lesions/tumors  Neuro:  cranial nerve II-XII intact; normal gait  Cardio:  no edema    No evidence of middle ear pathology or vertigo related to the ear. This note was generated completely or in part utilizing Dragon dictation speech recognition software. Occasionally, words are mistranscribed and despite editing, the text may contain inaccuracies due to incorrect word recognition. If further clarification is needed please contact the office at [phone]. An electronic signature was used to authenticate this note. --Jairo Solares MD

## 2021-04-02 ENCOUNTER — OFFICE VISIT (OUTPATIENT)
Dept: FAMILY MEDICINE CLINIC | Age: 71
End: 2021-04-02
Payer: MEDICARE

## 2021-04-02 VITALS
HEIGHT: 69 IN | DIASTOLIC BLOOD PRESSURE: 78 MMHG | BODY MASS INDEX: 33.03 KG/M2 | SYSTOLIC BLOOD PRESSURE: 124 MMHG | WEIGHT: 223 LBS | TEMPERATURE: 98.1 F

## 2021-04-02 DIAGNOSIS — N41.9 PROSTATITIS, UNSPECIFIED PROSTATITIS TYPE: Primary | ICD-10-CM

## 2021-04-02 PROCEDURE — G8427 DOCREV CUR MEDS BY ELIG CLIN: HCPCS | Performed by: INTERNAL MEDICINE

## 2021-04-02 PROCEDURE — G8417 CALC BMI ABV UP PARAM F/U: HCPCS | Performed by: INTERNAL MEDICINE

## 2021-04-02 PROCEDURE — 1036F TOBACCO NON-USER: CPT | Performed by: INTERNAL MEDICINE

## 2021-04-02 PROCEDURE — 3288F FALL RISK ASSESSMENT DOCD: CPT | Performed by: INTERNAL MEDICINE

## 2021-04-02 PROCEDURE — 99213 OFFICE O/P EST LOW 20 MIN: CPT | Performed by: INTERNAL MEDICINE

## 2021-04-02 PROCEDURE — 1123F ACP DISCUSS/DSCN MKR DOCD: CPT | Performed by: INTERNAL MEDICINE

## 2021-04-02 PROCEDURE — 3017F COLORECTAL CA SCREEN DOC REV: CPT | Performed by: INTERNAL MEDICINE

## 2021-04-02 PROCEDURE — 4040F PNEUMOC VAC/ADMIN/RCVD: CPT | Performed by: INTERNAL MEDICINE

## 2021-04-02 RX ORDER — SULFAMETHOXAZOLE AND TRIMETHOPRIM 800; 160 MG/1; MG/1
1 TABLET ORAL 2 TIMES DAILY
Qty: 42 TABLET | Refills: 0 | Status: SHIPPED | OUTPATIENT
Start: 2021-04-02 | End: 2021-04-23

## 2021-04-02 ASSESSMENT — ENCOUNTER SYMPTOMS
RHINORRHEA: 0
APNEA: 0
ABDOMINAL PAIN: 0
SINUS PAIN: 0
SHORTNESS OF BREATH: 0
COUGH: 0

## 2021-04-02 NOTE — PROGRESS NOTES
Paul Garcia (:  1950) is a 80 y.o. male,Established patient, here for evaluation of the following chief complaint(s): Other (patient c/o ? enlarged prostate x 1 week)      ASSESSMENT/PLAN:   Diagnosis Orders   1. Prostatitis, unspecified prostatitis type       Outpatient Encounter Medications as of 2021   Medication Sig Dispense Refill    sulfamethoxazole-trimethoprim (BACTRIM DS;SEPTRA DS) 800-160 MG per tablet Take 1 tablet by mouth 2 times daily for 21 days 42 tablet 0    TRULANCE 3 MG TABS Take 3 mg by mouth daily      docusate sodium (COLACE) 100 MG capsule Take 100 mg by mouth 2 times daily      Magnesium 100 MG CAPS Take 400 mg by mouth daily       Turmeric 500 MG CAPS Take by mouth daily       vitamin C (ASCORBIC ACID) 500 MG tablet Take 1,000 mg by mouth daily      loratadine (CLARITIN) 10 MG tablet Take 10 mg by mouth daily      fluticasone (VERAMYST) 27.5 MCG/SPRAY nasal spray 2 sprays by Each Nostril route daily      Multiple Vitamins-Minerals (THERAPEUTIC MULTIVITAMIN-MINERALS) tablet Take 1 tablet by mouth daily      albuterol sulfate HFA (VENTOLIN HFA) 108 (90 BASE) MCG/ACT inhaler Inhale 2 puffs into the lungs every 6 hours as needed for Wheezing Dispense with a spacing device 1 Inhaler 0    glucosamine-chondroitin (GLUCOSAMINE CHONDR COMPLEX) 500-400 MG CAPS Take by mouth daily       [DISCONTINUED] LINZESS 145 MCG capsule       magnesium citrate solution Take 296 mLs by mouth once for 1 dose 296 mL 0    [DISCONTINUED] polyethylene glycol (GLYCOLAX) 17 g packet Take 17 g by mouth 2 times daily       No facility-administered encounter medications on file as of 2021. No orders of the defined types were placed in this encounter.       SUBJECTIVE/OBJECTIVE:  HPI   Chief Complaint   Patient presents with    Other     patient c/o ? enlarged prostate x 1 week     Paul Garcia is a 80 y.o. male with the following history as recorded in [facility]:  Patient Active Problem List    Diagnosis Date Noted    Umbilical hernia without obstruction and without gangrene      Current Outpatient Medications   Medication Sig Dispense Refill    TRULANCE 3 MG TABS Take 3 mg by mouth daily      docusate sodium (COLACE) 100 MG capsule Take 100 mg by mouth 2 times daily      Magnesium 100 MG CAPS Take 400 mg by mouth daily       Turmeric 500 MG CAPS Take by mouth daily       vitamin C (ASCORBIC ACID) 500 MG tablet Take 1,000 mg by mouth daily      loratadine (CLARITIN) 10 MG tablet Take 10 mg by mouth daily      fluticasone (VERAMYST) 27.5 MCG/SPRAY nasal spray 2 sprays by Each Nostril route daily      Multiple Vitamins-Minerals (THERAPEUTIC MULTIVITAMIN-MINERALS) tablet Take 1 tablet by mouth daily      albuterol sulfate HFA (VENTOLIN HFA) 108 (90 BASE) MCG/ACT inhaler Inhale 2 puffs into the lungs every 6 hours as needed for Wheezing Dispense with a spacing device 1 Inhaler 0    glucosamine-chondroitin (GLUCOSAMINE CHONDR COMPLEX) 500-400 MG CAPS Take by mouth daily       magnesium citrate solution Take 296 mLs by mouth once for 1 dose 296 mL 0     No current facility-administered medications for this visit.       Allergies: Penicillins  Past Medical History:   Diagnosis Date    Asthma     Diverticulitis     Influenza A 01/05/2020    Irritable bowel syndrome     Prostatitis     Seasonal allergies      Past Surgical History:   Procedure Laterality Date    COLONOSCOPY  9/4/2012    Dr. Jerald Harris, check in 5 years    COLONOSCOPY  09/06/2017    dr rhodes at [facility] [place] [facility] polyp, repeat 5 years    HERNIA REPAIR  43/20/4351    umbilical    NASAL SINUS SURGERY  about 1998     Family History   Problem Relation Age of Onset    Cancer Father         colon    High Blood Pressure Father      Social History     Tobacco Use    Smoking status: Never Smoker    Smokeless tobacco: Never Used   Substance Use Topics    Alcohol use: Yes     Comment: social use       Review of Systems Constitutional: Negative for chills, diaphoresis and fatigue. HENT: Negative for congestion, postnasal drip, rhinorrhea and sinus pain. Eyes: Negative for visual disturbance. Respiratory: Negative for apnea, cough and shortness of breath. Cardiovascular: Negative for chest pain. Gastrointestinal: Negative for abdominal pain. Physical Exam  Constitutional:       Appearance: Normal appearance. Neck:      Musculoskeletal: No neck rigidity. Cardiovascular:      Rate and Rhythm: Normal rate and regular rhythm. Heart sounds: No murmur. Pulmonary:      Effort: No respiratory distress. Breath sounds: No wheezing. Abdominal:      General: There is no distension. Tenderness: There is no abdominal tenderness. Skin:     Coloration: Skin is not jaundiced. Findings: No rash. Neurological:      General: No focal deficit present. Mental Status: He is alert and oriented to person, place, and time. Cranial Nerves: No cranial nerve deficit. Motor: No weakness.                  An electronic signature was used to authenticate this note.    --Louis Roth, DO

## 2021-04-02 NOTE — PATIENT INSTRUCTIONS
Thank you for choosing [facility]. Please bring a current list of medications to every appointment. Please contact your pharmacy for any prescription refill(s) that you are requesting.

## 2021-04-06 ENCOUNTER — OFFICE VISIT (OUTPATIENT)
Dept: FAMILY MEDICINE CLINIC | Age: 71
End: 2021-04-06
Payer: MEDICARE

## 2021-04-06 VITALS
HEIGHT: 69 IN | TEMPERATURE: 98.1 F | BODY MASS INDEX: 32.88 KG/M2 | WEIGHT: 222 LBS | DIASTOLIC BLOOD PRESSURE: 82 MMHG | SYSTOLIC BLOOD PRESSURE: 128 MMHG

## 2021-04-06 DIAGNOSIS — R42 VERTIGO: Primary | ICD-10-CM

## 2021-04-06 PROCEDURE — 1123F ACP DISCUSS/DSCN MKR DOCD: CPT | Performed by: FAMILY MEDICINE

## 2021-04-06 PROCEDURE — 99212 OFFICE O/P EST SF 10 MIN: CPT | Performed by: FAMILY MEDICINE

## 2021-04-06 PROCEDURE — 1036F TOBACCO NON-USER: CPT | Performed by: FAMILY MEDICINE

## 2021-04-06 PROCEDURE — G8417 CALC BMI ABV UP PARAM F/U: HCPCS | Performed by: FAMILY MEDICINE

## 2021-04-06 PROCEDURE — 3017F COLORECTAL CA SCREEN DOC REV: CPT | Performed by: FAMILY MEDICINE

## 2021-04-06 PROCEDURE — 4040F PNEUMOC VAC/ADMIN/RCVD: CPT | Performed by: FAMILY MEDICINE

## 2021-04-06 PROCEDURE — G8427 DOCREV CUR MEDS BY ELIG CLIN: HCPCS | Performed by: FAMILY MEDICINE

## 2021-04-06 NOTE — PROGRESS NOTES
Subjective:      Patient ID: Mirza Marquez is a 80 y.o. male. Chief Complaint   Patient presents with    Follow-up     dizziness        Patient presents with:   Follow-up: dizziness    He is the same  He did try some eply exercise at home and may have helped  He did not do the vestibular therapy    YOB: 1950    Date of Visit:  4/6/2021     -- Penicillins -- Hives    Current Outpatient Medications:  sulfamethoxazole-trimethoprim (BACTRIM DS;SEPTRA DS) 800-160 MG per tablet, Take 1 tablet by mouth 2 times daily for 21 days, Disp: 42 tablet, Rfl: 0  TRULANCE 3 MG TABS, Take 3 mg by mouth daily, Disp: , Rfl:   docusate sodium (COLACE) 100 MG capsule, Take 100 mg by mouth 2 times daily, Disp: , Rfl:   Magnesium 100 MG CAPS, Take 400 mg by mouth daily , Disp: , Rfl:   Turmeric 500 MG CAPS, Take by mouth daily , Disp: , Rfl:   vitamin C (ASCORBIC ACID) 500 MG tablet, Take 1,000 mg by mouth daily, Disp: , Rfl:   loratadine (CLARITIN) 10 MG tablet, Take 10 mg by mouth daily, Disp: , Rfl:    fluticasone (VERAMYST) 27.5 MCG/SPRAY nasal spray, 2 sprays by Each Nostril route daily, Disp: , Rfl:   Multiple Vitamins-Minerals (THERAPEUTIC MULTIVITAMIN-MINERALS) tablet, Take 1 tablet by mouth daily, Disp: , Rfl:   albuterol sulfate HFA (VENTOLIN HFA) 108 (90 BASE) MCG/ACT inhaler, Inhale 2 puffs into the lungs every 6 hours as needed for Wheezing Dispense with a spacing device, Disp: 1 Inhaler, Rfl: 0  glucosamine-chondroitin (GLUCOSAMINE CHONDR COMPLEX) 500-400 MG CAPS, Take by mouth daily , Disp: , Rfl:   magnesium citrate solution, Take 296 mLs by mouth once for 1 dose, Disp: 296 mL, Rfl: 0    No current facility-administered medications for this visit.       ---------------------------               04/06/21                      1632         ---------------------------   BP:          128/82         Site:    Left Upper Arm     Position:     Sitting        Cuff Size:   Large Adult Temp:   98.1 °F (36.7 °C)   TempSrc:    Temporal        Weight: 222 lb (100.7 kg)   Height:  5' 9\" (1.753 m)   ---------------------------  Body mass index is 32.78 kg/m². Wt Readings from Last 3 Encounters:  04/06/21 : 222 lb (100.7 kg)  04/02/21 : 223 lb (101.2 kg)  03/10/21 : 221 lb (100.2 kg)    BP Readings from Last 3 Encounters:  04/06/21 : 128/82  04/02/21 : 124/78  03/10/21 : 133/80            Review of Systems    Objective:   Physical Exam  Constitutional:       General: He is not in acute distress. Appearance: Normal appearance. He is not ill-appearing. Neurological:      General: No focal deficit present. Mental Status: He is alert. Assessment:       Diagnosis Orders   1.  Vertigo       I reviewed the ent notes  patient was to contact them but has not  Discussed with him today      Plan:      Do contact dr Jr Benton to get the vestibular therapy started   Follow up with him in 4 week after starting         Toan Valle MD

## 2021-04-06 NOTE — PATIENT INSTRUCTIONS
Do contact dr Rahel Cruz to get the vestibular therapy started   Follow up with him in 4 week after starting

## 2021-04-07 ENCOUNTER — TELEPHONE (OUTPATIENT)
Dept: ENT CLINIC | Age: 71
End: 2021-04-07

## 2021-04-07 ENCOUNTER — TELEPHONE (OUTPATIENT)
Dept: FAMILY MEDICINE CLINIC | Age: 71
End: 2021-04-07

## 2021-04-07 DIAGNOSIS — R42 VERTIGO: Primary | ICD-10-CM

## 2021-04-07 NOTE — TELEPHONE ENCOUNTER
Referral put in for vestibular therapy and patient was scheduled for a F/U in a month with Dr. Jr Benton

## 2021-04-07 NOTE — TELEPHONE ENCOUNTER
Patient called he can not get in with the ENT for a month, he stated you told him to call and you would see what you could do to get him in sooner.       Please call, [phone]

## 2021-04-08 NOTE — TELEPHONE ENCOUNTER
(reviewed telephone encounter from ENT - it looks like patient has a referral for Vestibular therapy and scheduled to see Dr. Tab Carr a month later. [phone] (home)   - attempted to call - pt picked up phone then hung up., will try again.

## 2021-04-08 NOTE — TELEPHONE ENCOUNTER
Aida Jaimes advised and verbalized understanding. He will contact Dr. Blane Benedict office to know where he can go for therapy.

## 2021-04-12 ENCOUNTER — HOSPITAL ENCOUNTER (OUTPATIENT)
Dept: PHYSICAL THERAPY | Age: 71
Setting detail: THERAPIES SERIES
Discharge: HOME | End: 2021-04-12
Payer: MEDICARE

## 2021-04-12 PROCEDURE — 97112 NEUROMUSCULAR REEDUCATION: CPT

## 2021-04-12 PROCEDURE — 97161 PT EVAL LOW COMPLEX 20 MIN: CPT

## 2021-04-12 PROCEDURE — 97530 THERAPEUTIC ACTIVITIES: CPT

## 2021-04-12 NOTE — PLAN OF CARE
stiffness - has loosened up since starting exercises at home. Cervical Pain: 0/10  Cervical spine ROM:  []  WNL [x] Impaired: mod decreased cervical ROM throughout    LE ROM: B=WNL    LE Strength: B=5/5    Posture:   Forward head/shoulders    Somatosensory:  Light touch:  [x] Normal [] Impaired Comments:    Coordination:  Rapid alternating movements: [x] Normal [] Dysdiadokinesia   Finger to Nose:   [x] Normal [] Dysmetric  Heel to Shin:    [x] Normal [] Ataxic    Postural Control Tests:  Clinical Test of Sensory Interaction for Balance (CTSIB) performed in Romberg stance  CONDITION TIME STRATEGY SWAY    Eyes open, firm surface x30 sec  none    Eyes closed, firm surface x30 sec   none    Eyes open, foam surface x30 sec  none    Eyes closed, foam surface x30 sec  hip mild     Gait: No major deviations noted      Oculomotor/Vestibular Examination:    Spontaneous nystagmus:  [] Left  [] Right [x] Absent  Gaze-Evoked nystagmus with fixation present:   Primary [] Present [x] Absent   Right  [] Present [x] Absent   Left  [] Present [x] Absent  VOR Head Thrust Test: [] Normal [x] Abnormal  Comments: (+) with fast beat to the left  VOR Cancellation:  [x] Normal [] Abnormal Comments:   Smooth Pursuit:  [] Normal [x] Abnormal Comments: (+) with fast beat to the left  Saccades:   [] Normal [x] Abnormal Comments: (+) with fast beat to the left  Convergence:   [x] Normal [] Abnormal Comments:     Positional Testing  R Hallpike-Dresden maneuver:    Nystagmus:  [] Yes  [x] No  [] Duration:       [] Direction:    Vertigo:  [] Yes  [x] No  [] Duration:   L Hallpike-Anjum maneuver:   Nystagmus:  [] Yes  [x] No  [] Duration:       [] Direction:    Vertigo:  [x] Yes  [] No  [] Duration: pt reported feeling nauseous but nothing else  Supine roll head right:   Nystagmus:  [] Yes  [x] No  [] Duration:       [] Direction:    Vertigo:  [] Yes  [x] No  [] Duration:   Supine roll head left:   Nystagmus:  [] Yes  [x] No  [] Duration:       [] Direction:    Vertigo:  [] Yes  [x] No  [] Duration:     Outcome Measures  Dizziness Handicap Index ([address] [place] [address]) 28       [x] Patient history, allergies, meds reviewed. Medical chart reviewed. See intake form. Review of Systems (ROS):  [x]Performed Review of systems (Integumentary, Cardiopulmonary, Neurological) by intake and observation. Intake form has been scanned into medical record. Patient has been instructed to contact their primary care physician regarding ROS issues if not already being addressed at this time.       Co-morbidities/Complexities (which will affect course of rehabilitation):   []None           Arthritic conditions   []Rheumatoid arthritis (M05.9)  []Osteoarthritis (M19.91)   Cardiovascular conditions   []Hypertension (I10)  []Hyperlipidemia (E78.5)  []Angina pectoris (I20)  []Atherosclerosis (I70)   Musculoskeletal conditions   []Disc pathology   []Congenital spine pathologies   []Prior surgical intervention  []Osteoporosis (M81.8)  []Osteopenia (M85.8)   Endocrine conditions   []Hypothyroid (E03.9)  []Hyperthyroid Gastrointestinal conditions   []Constipation (Z75.90)   Metabolic conditions   []Morbid obesity (E66.01)  []Diabetes type 1(E10.65) or 2 (E11.65)   []Neuropathy (G60.9)     Pulmonary conditions   [x]Asthma (J45)  []Coughing   []COPD (J44.9)   Psychological Disorders  []Anxiety (F41.9)  []Depression (F32.9)   []Other:   []Other:           Barriers to/and or personal factors that will affect rehab potential:              []Age  []Sex    []Smoker              []Motivation/Lack of Motivation                        []Co-Morbidities              []Cognitive Function, education/learning barriers              []Environmental, home barriers              []profession/work barriers  []past PT/medical experience  []other:  Justification:     ASSESSMENT:      Functional Impairments:  Problem List/Functional Limitations:   [] BPPV    [] Right [] Posterior Canal [] Canalithiasis    [] Left  [] Horizontal Canal [] Cupulolithiasis   [x] Decreased Gaze Stabilization    [] Increased Motion Sensitivity   [x] Unilateral Vestibular Hypofunction   [] Bilateral Vestibular Hypofunction   [] Gait Instability    [] Decreased tolerance for ADLs    [] Decreased functional strength   [x] Reduced Balance/Proprioceptive control   [] Reduced ability to hear/focus   [] Noted cervical/thoracic/GHJ joint hypomobility   [] Noted cervical/thoracic/GHJ joint hypermobility   [] Decreased cervical/UE functional ROM   [] Noted Headache pain aggravated by neck movements with/without dizziness   [] Abnormal reflexes/sensation/myotomal/dermatomal deficits   [] Decreased DCF control or ability to hold head up   [] Decreased RC/scapular/core strength and neuromuscular control     Functional Activity Limitations (from functional questionnaire and intake)   []Reduced ability to tolerate prolonged functional positions   [x]Reduced ability or difficulty with changes of positions or transfers between positions  [x]Reduced ability to transfer in/out of bed or rolling in bed   [x]Reduced ability or tolerance with driving, reading and/or computer work   [x]Reduced ability to perform lifting, reaching, carrying tasks   [x]Reduced ability to forward bend   []Reduced ability to ambulate prolonged functional periods/distances/surfaces   []Reduced ability to ascend/descend stairs  [x]Reduced ability to concentrate/focus  [x]Reduced ability to turn/pitch head rapidly  []Reduced ability to self-correct for losses of balance []other:       Participation Restrictions   [x]Reduced participation in self-care activities   [x]Reduced participation in home management activities   []Reduced participation in work activities   [x]Reduced participation in social activities   []Reduced participation in sport/recreational activities    Classification:   []Signs/symptoms consistent with BPPV (benign paroxysmal positional vertigo)      [x]Signs/symptoms consistent with unilateral peripheral vestibulopathy (i.e., vestibular neuritis, labyrinthitis, acoustic neuroma)   []Signs/symptoms consistent with central vestibulopathy  []Signs/symptoms consistent with migraine-related vestibulopathy  []Signs/symptoms consistent with Meniere's disease / post-traumatic hydrops  []Signs/symptoms consistent with perilymphatic fistula   []Signs/symptoms consistent with cervicogenic dizziness  []Signs/symptoms consistent with gait instability   []Signs/symptoms consistent with motion sensitivity    []signs/symptoms consistent with neck pain with mobility deficits     []signs/symptoms consistent with neck pain with movement coordinated impairments    []signs/symptoms consistent with neck pain with radiating pain    []signs/symptoms consistent with neck pain with headaches (cervicogenic)    []Signs/symptoms consistent with nerve root involvement including myotome & dermatome dysfunction   []sign/symptoms consistent with facet dysfunction of cervical and thoracic spine    []signs/symptoms consistent suggesting central cord compression/UMN syndromes   []signs/symptoms consistent with discogenic cervical pain   []signs/symptoms consistent with rib dysfunction   []signs/symptoms consistent with postural dysfunction   []signs/symptoms consistent with shoulder pathology    []signs/symptoms consistent with post-surgical status including decreased ROM, strength and function.    []signs/symptoms consistent with pathology which may benefit from Dry Needling  []signs/symptoms which may limit the use of advanced manual therapy techniques: (Elevated CV risk profile, recent trauma, intolerance to end range positions, prior TIA, visual issues, UE neurological compromise)   []other:      Prognosis/Rehab Potential:      [x]Excellent   []Good    []Fair   []Poor    Tolerance of evaluation/treatment:    []Excellent   [x]Good    []Fair   []Poor     Physical Therapy Evaluation Complexity Justification  [x] A history of present problem with:  [x] no personal factors and/or comorbidities that impact the plan of care;  []1-2 personal factors and/or comorbidities that impact the plan of care  []3 personal factors and/or comorbidities that impact the plan of care  [x] An examination of body systems using standardized tests and measures addressing any of the following: body structures and functions (impairments), activity limitations, and/or participation restrictions;:  [x] a total of 1-2 or more elements   [] a total of 3 or more elements   [] a total of 4 or more elements   [x] A clinical presentation with:  [x] stable and/or uncomplicated characteristics   [] evolving clinical presentation with changing characteristics  [] unstable and unpredictable characteristics;   [x] Clinical decision making of [x] low, [] moderate, [] high complexity using standardized patient assessment instrument and/or measurable assessment of functional outcome. [x] EVAL (LOW) 48897 (typically 15 minutes face-to-face)  [] EVAL (MOD) 22311 (typically 30 minutes face-to-face)  [] EVAL (HIGH) 99674 (typically 45 minutes face-to-face)  [] RE-EVAL     HEP instruction: Written HEP instructions provided and reviewed. GOALS:  Patient stated goal: \"be able to move around without dizziness\"  [] Progressing: [] Met: [] Not Met: [] Adjusted    Therapist goals for Patient:   Short Term Goals: To be achieved in: 2 weeks  1. Independent in HEP and progression per patient tolerance, in order to prevent re-injury. [] Progressing: [] Met: [] Not Met: [] Adjusted  2. Patient will have a decrease in dizziness/imbalance/symptoms by 40% to facilitate improvement in movement, function, balance and ADLs as indicated by Functional Deficits. [] Progressing: [] Met: [] Not Met: [] Adjusted    Long Term Goals: To be achieved in: by discharge  1. Disability index score of 25% or less for the [facility] to assist with reaching prior level of function.    [] Progressing: [] Met: [] Not Met: [] Adjusted  2. Patient will demonstrate negative oculomotor special testing/positional testing as indicated by patients Functional Deficits. [] Progressing: [] Met: [] Not Met: [] Adjusted  3. Patient will return to functional activities including bed mobility without increased symptoms or restriction. [] Progressing: [] Met: [] Not Met: [] Adjusted  4. Patient will be able to bend over without increased symptoms or restriction. [] Progressing: [] Met: [] Not Met: [] Adjusted     PLAN:   Today's Treatment:    [x] See flowsheet   [] Patient treated with canalith repositioning maneuver   [] Education materials provided on BPPV/Vestibular Dysfunction/Habituation   [] Precautions provided and patient to follow precautions for next 24 hours in regards to BPPV management   [x] Written home exercise instructions   [] Other:    Frequency/Duration:  2 days per week for 4 Weeks:  Interventions:  [x]  Therapeutic exercise including: strength training, ROM, for LEs, cervical spine & UEs  [x]  NMR activation and proprioception for BLEs, vestibular training/habituation, balance, coordination  [x]  Manual therapy as indicated via: canalith repositioning maneuvers, Dry Needling/IASTM, STM, PROM, Gr I-IV mobilizations, manipulation. [x]  Modalities as needed that may include: thermal agents, E-stim, Biofeedback, US, iontophoresis as indicated  [x]  Gait training  [x]  Patient education on BPPV/vestibular function, balance, postural re-education, activity modification, progression of HEP. Electronically signed by:  Angela Owusu PT , OMHUGH-C,  [id number]    Note: If patient does not return for scheduled/recommended follow up visits, this note will serve as a discharge from care along with the most recent update on progress.

## 2021-04-12 NOTE — FLOWSHEET NOTE
[facility], [facility]  [address] [place], [facility]  Phone: [phone]   Fax:     [phone]      Physical Therapy Treatment Note/ Progress Report:   Date:  2021    Patient Name:  Lizette Gracia    :  1950  MRN: [id number]    Pertinent Medical History:  asthma    Medical/Treatment Diagnosis Information:  · Diagnosis: Vertigo  · Treatment Diagnosis: Vestibular impairment, Decreased balance    Insurance/Certification information:  PT Insurance Information: Medicare  Physician Information:  Referring Practitioner: Rick Shannon MD  Plan of care signed (Y/N): []  Yes [x]  No     Date of Patient follow up with Physician: CHASIDY     Progress Report: []  Yes  [x]  No     Date Range for reporting period:  Beginnin2021  Ending:     Progress report due (10 Rx/or 30 days whichever is less):      Recertification due (POC duration/ or 90 days whichever is less):  21    Visit # Insurance Allowable Auth required?  BOMN []  Yes [x]  No     Latex Allergy:  [x]NO      []YES  Preferred Language for Healthcare:   [x]English       []other:    Functional Outcomes Measure:   Date Assessed: at [facility]  Test: [address]  Score: 28    Dizziness level:  6/10     History of Injury:Patient reports onset of dizziness about 3 months ago. Patient notes his first attack was when he stood he was off balance and ran into walls. Patient notes that 2 months ago he could barely move around without feeling like he wanted to throw up. He has been performing epley maneuver on his own. Notes he is doing better but still gets dizzy. Patient notes his dizziness is aggravated with quick movement of the head, bending over, and performing sit to stand. Patient notes when he gets episodes the room does not spin but he feels like Marlin Simmons is somewhere else\" and is lightheaded.   Was treated at the ER in the beginning for his dizzy and CT scan was negative. Patient was prescribed meclozine but rarely takes it    SUBJECTIVE:  See eval    OBJECTIVE:  Postural Control Tests:  Clinical Test of Sensory Interaction for Balance (CTSIB) performed in Romberg stance  CONDITION TIME STRATEGY SWAY    Eyes open, firm surface x30 sec   none    Eyes closed, firm surface x30 sec    none    Eyes open, foam surface x30 sec   none    Eyes closed, foam surface x30 sec  hip mild      Gait: No major deviations noted                 Oculomotor/Vestibular Examination:     Spontaneous nystagmus:       []? Left             []? Right           [x]? Absent  Gaze-Evoked nystagmus with fixation present:              Primary            []? Present       [x]? Absent              Right                []? Present       [x]? Absent              Left                  []? Present       [x]? Absent  VOR Head Thrust Test:          []? Normal       [x]? Abnormal    Comments: (+) with fast beat to the left  VOR Cancellation:                  [x]? Normal       []? Abnormal    Comments:   Smooth Pursuit:                      []? Normal       [x]? Abnormal    Comments: (+) with fast beat to the left  Saccades:                               []? Normal       [x]? Abnormal    Comments: (+) with fast beat to the left  Convergence:                          [x]? Normal       []? Abnormal    Comments:      Positional Testing  R Hallpike-Scappoose maneuver:               Nystagmus:     []? Yes             [x]? No               []? Duration:                                          []? Direction:                  Vertigo:            []? Yes             [x]? No               []? Duration:   L Hallpike-Scappoose maneuver:              Nystagmus:     []? Yes             [x]? No               []? Duration:                                          []? Direction:                  Vertigo:            [x]?  Yes             []? No               []? Duration: pt reported feeling nauseous but nothing else  Supine roll head right:              Nystagmus:     []? Yes             [x]? No               []? Duration:                                          []? Direction:                  Vertigo:            []? Yes             [x]? No               []? Duration:   Supine roll head left:              Nystagmus:     []? Yes             [x]? No               []? Duration:                                          []? Direction:                  Vertigo:            []? Yes             [x]? No               []? Duration:       RESTRICTIONS/PRECAUTIONS: none    Exercises/Interventions:     Therapeutic Exercises (73255) Min: Resistance/Reps Notes/Cues                            Therapeutic Activities (99285) Min:10     Step up and down off air ex add    Balance on gumdrops Add    Rockerboard f/b, s/s Add     Advanced Gait:  *with head turns/nods  *marching  *backwards  *with eyes closed  *while tossing ball from one hand to another   Add  Add  Add  Add  add         Review of the effects of vestibular impairment on ADLs, transfers, functional activities x10 mins         Neuromuscular Re-ed (81652) Min:15     Type I viewing x10 mins    Cassidy Daroff Ex x5 mins    AirEx:     *NBOS with EC     *NBOS with head turns/nods    *NBOS with head up    *NBOS with eyes following ball    *NBOS with eyes and head following ball  *tandem stance   Add  add    Add  Add    Add    add    Seated quick head pitch with focus on PT add    Sit to stand with eyes closed add    Seated bend down and touch gumdrops L/R/C add         Manual Intervention (01142) Min:                Modalities  Min:                      Other Therapeutic Activities: Pt was educated on PT POC, Diagnosis, Prognosis, pathomechanics as well as frequency and duration of scheduling future physical therapy appointments. Time was also taken on this day to answer all patient questions and participation in PT. Reviewed appointment policy in detail with patient and patient verbalized understanding. therapy to mobilize LE, proximal hip and/or LS spine soft tissue/joints for the purpose of modulating pain, promoting relaxation,  increasing ROM, reducing/eliminating soft tissue swelling/inflammation/restriction, improving soft tissue extensibility and allowing for proper ROM for normal function with self care, mobility, lifting and ambulation. Charges:  Timed Code Treatment Minutes: 25   Total Treatment Minutes: 50      [x] EVAL (LOW) 77327 (typically 20 minutes face-to-face)  [] EVAL (MOD) 17894 (typically 30 minutes face-to-face)  [] EVAL (HIGH) 49986 (typically 45 minutes face-to-face)  [] RE-EVAL     [] OV(36936) x     [] Dry needle 1 or 2 Muscles (24113)  [x] NMR (56292) x     [] Dry needle 3+ Muscles (04716)  [] Manual (36521) x     [] Ultrasound (44937) x  [x] TA (77155) x     [] Mech Traction (67848)  [] ES(attended) (01878)     [] ES (un) (43852):   [] Vasopump (60690) [] Ionto (06337)   [] Other:    GOALS:  Patient stated goal: \"be able to move around without dizziness\"  []? Progressing: []? Met: []? Not Met: []? Adjusted     Therapist goals for Patient:   Short Term Goals: To be achieved in: 2 weeks  1. Independent in HEP and progression per patient tolerance, in order to prevent re-injury. []? Progressing: []? Met: []? Not Met: []? Adjusted  2. Patient will have a decrease in dizziness/imbalance/symptoms by 40% to facilitate improvement in movement, function, balance and ADLs as indicated by Functional Deficits. []? Progressing: []? Met: []? Not Met: []? Adjusted     Long Term Goals: To be achieved in: by discharge  1. Disability index score of 25% or less for the [facility] to assist with reaching prior level of function. []? Progressing: []? Met: []? Not Met: []? Adjusted  2. Patient will demonstrate negative oculomotor special testing/positional testing as indicated by patients Functional Deficits. []? Progressing: []? Met: []? Not Met: []? Adjusted  3.  Patient will return to functional activities including bed mobility without increased symptoms or restriction. []? Progressing: []? Met: []? Not Met: []? Adjusted  4. Patient will be able to bend over without increased symptoms or restriction. []? Progressing: []? Met: []? Not Met: []? Adjusted         ASSESSMENT:  See eval    Treatment/Activity Tolerance:  [x] Patient tolerated treatment well [] Patient limited by fatique  [] Patient limited by pain  [] Patient limited by other medical complications  [] Other:     Overall Progression Towards Functional goals/ Treatment Progress Update:  [] Patient is progressing as expected towards functional goals listed. [] Progression is slowed due to complexities/Impairments listed. [] Progression has been slowed due to co-morbidities. [x] Plan just implemented, too soon to assess goals progression <30days   [] Goals require adjustment due to lack of progress  [] Patient is not progressing as expected and requires additional follow up with physician  [] Other    Prognosis for POC: [x] Good [] Fair  [] Poor    Patient requires continued skilled intervention: [x] Yes  [] No        PLAN:   [] Continue per plan of care [] Alter current plan (see comments)  [x] Plan of care initiated [] Hold pending MD visit [] Discharge    Electronically signed by: Eber Brown PT , OMT-C,  [id number]      Note: If patient does not return for scheduled/recommended follow up visits, this note will serve as a discharge from care along with the most recent update on progress.

## 2021-04-14 ENCOUNTER — HOSPITAL ENCOUNTER (OUTPATIENT)
Dept: PHYSICAL THERAPY | Age: 71
Setting detail: THERAPIES SERIES
Discharge: HOME | End: 2021-04-14
Payer: MEDICARE

## 2021-04-14 PROCEDURE — 97112 NEUROMUSCULAR REEDUCATION: CPT

## 2021-04-14 PROCEDURE — 97530 THERAPEUTIC ACTIVITIES: CPT

## 2021-04-14 NOTE — FLOWSHEET NOTE
and CT scan was negative. Patient was prescribed meclozine but rarely takes it    SUBJECTIVE: 4/14: notes today he is a little more \"foggy\". Doing alright with HEP    OBJECTIVE:  Postural Control Tests:  Clinical Test of Sensory Interaction for Balance (CTSIB) performed in Romberg stance    TIME STRATEGY SWAY    Eyes open, firm surface x30 sec   none    Eyes closed, firm surface x30 sec    none    Eyes open, foam surface x30 sec   none    Eyes closed, foam surface x30 sec  hip mild      Gait: No major deviations noted                 Oculomotor/Vestibular Examination:     Spontaneous nystagmus:       []? Left             []? Right           [x]? Absent  Gaze-Evoked nystagmus with fixation present:              Primary            []? Present       [x]? Absent              Right                []? Present       [x]? Absent              Left                  []? Present       [x]? Absent  VOR Head Thrust Test:          []? Normal       [x]? Abnormal    Comments: (+) with fast beat to the left  VOR Cancellation:                  [x]? Normal       []? Abnormal    Comments:   Smooth Pursuit:                      []? Normal       [x]? Abnormal    Comments: (+) with fast beat to the left  Saccades:                               []? Normal       [x]? Abnormal    Comments: (+) with fast beat to the left  Convergence:                          [x]? Normal       []? Abnormal    Comments:      Positional Testing  R Hallpike-Anjum maneuver:               Nystagmus:     []? Yes             [x]? No               []? Duration:                                          []? Direction:                  Vertigo:            []? Yes             [x]? No               []? Duration:   L Hallpike-[place] maneuver:              Nystagmus:     []? Yes             [x]? No               []? Duration:                                          []? Direction:                  Vertigo:            [x]?  Yes             []? No               []? Duration: pt reported feeling nauseous but nothing else  Supine roll head right:              Nystagmus:     []? Yes             [x]? No               []? Duration:                                          []? Direction:                  Vertigo:            []? Yes             [x]? No               []? Duration:   Supine roll head left:              Nystagmus:     []? Yes             [x]? No               []? Duration:                                          []? Direction:                  Vertigo:            []? Yes             [x]? No               []? Duration:       RESTRICTIONS/PRECAUTIONS: none    Exercises/Interventions:     Therapeutic Exercises (91477) Min: Resistance/Reps Notes/Cues                            Therapeutic Activities (35475) Min:15     Step up and down off air ex 10x L/R    Balance on gumdrops x1 min    Rockerboard f/b, s/s x1 min each      Advanced Gait:  *with head turns/nods  *marching  *backwards  *with eyes closed  *while tossing ball from one hand to another   28' x2   28' x2  28' x2  28' x2  28 x2              Neuromuscular Re-ed (99375) Min:30     Type I viewing x5 minsAdd Type II viewing next week   Jose Baptiste Ex    AirEx:     *NBOS with EC     *NBOS with head turns/nods    *NBOS with eyes following ball    *NBOS with eyes and head following ball  *tandem stance   x1 min  x1 min each     x1 min     x1 min    x1 min L/R    Seated quick head pitch with focus on PT 10x L/R    Sit to stand with eyes closed 10x    Seated bend down and touch gumdrops L/R/C 5x each          Manual Intervention (13280) Min:                Modalities  Min:                      Other Therapeutic Activities: Pt was educated on PT POC, Diagnosis, Prognosis, pathomechanics as well as frequency and duration of scheduling future physical therapy appointments. Time was also taken on this day to answer all patient questions and participation in PT.  Reviewed appointment policy in detail with patient and patient verbalized understanding. Home Exercise Program: Patient was instructed in the following for HEP: vestibular hypofunction protocol week 1-Type I viewing ex and Theodor Manolo ex. Patient verbalized/demonstrated understanding and was issued written handout. Therapeutic Exercise and NMR EXR  [] (42396) Provided verbal/tactile cueing for activities related to strengthening, flexibility, endurance, ROM for improvements in LE, proximal hip, and core control with self care, mobility, lifting, ambulation. [x] (87051) Provided verbal/tactile cueing for activities related to improving balance, coordination, kinesthetic sense, posture, motor skill, proprioception  to assist with LE, proximal hip, and core control in self care, mobility, lifting, ambulation and eccentric single leg control.  [address] and Therapeutic Activities:    [x] (95542 or 82343) Provided verbal/tactile cueing for activities related to improving balance, coordination, kinesthetic sense, posture, motor skill, proprioception and motor activation to allow for proper function of core, proximal hip and LE with self care and ADLs and functional mobility.   [] (24538) Gait Re-education- Provided training and instruction to the patient for proper LE, core and proximal hip recruitment and positioning and eccentric body weight control with ambulation re-education including up and down stairs     Home Exercise Program:    [x] (61163) Reviewed/Progressed HEP activities related to strengthening, flexibility, endurance, ROM of core, proximal hip and LE for functional self-care, mobility, lifting and ambulation/stair navigation   [] (25800)Reviewed/Progressed HEP activities related to improving balance, coordination, kinesthetic sense, posture, motor skill, proprioception of core, proximal hip and LE for self care, mobility, lifting, and ambulation/stair navigation      Manual Treatments:  PROM / STM / Oscillations-Mobs:  G-I, II, III, IV (PA's, Inf., Post.)  [] (30907) Provided manual therapy to mobilize LE, proximal hip and/or LS spine soft tissue/joints for the purpose of modulating pain, promoting relaxation,  increasing ROM, reducing/eliminating soft tissue swelling/inflammation/restriction, improving soft tissue extensibility and allowing for proper ROM for normal function with self care, mobility, lifting and ambulation. Charges:  Timed Code Treatment Minutes: 45   Total Treatment Minutes: 45      [] EVAL (LOW) 50387 (typically 20 minutes face-to-face)  [] EVAL (MOD) 80099 (typically 30 minutes face-to-face)  [] EVAL (HIGH) 69286 (typically 45 minutes face-to-face)  [] RE-EVAL     [] WW(00571) x     [] Dry needle 1 or 2 Muscles (92212)  [x] NMR (34251) x  2   [] Dry needle 3+ Muscles (53664)  [] Manual (30296) x     [] Ultrasound (51223) x  [x] TA (29896) x     [] Mech Traction (79433)  [] ES(attended) (68068)     [] ES (un) (49036):   [] Vasopump (50593) [] Ionto (83011)   [] Other:    GOALS:  Patient stated goal: \"be able to move around without dizziness\"  []? Progressing: []? Met: []? Not Met: []? Adjusted     Therapist goals for Patient:   Short Term Goals: To be achieved in: 2 weeks  1. Independent in HEP and progression per patient tolerance, in order to prevent re-injury. []? Progressing: []? Met: []? Not Met: []? Adjusted  2. Patient will have a decrease in dizziness/imbalance/symptoms by 40% to facilitate improvement in movement, function, balance and ADLs as indicated by Functional Deficits. []? Progressing: []? Met: []? Not Met: []? Adjusted     Long Term Goals: To be achieved in: by discharge  1. Disability index score of 25% or less for the [facility] to assist with reaching prior level of function. []? Progressing: []? Met: []? Not Met: []? Adjusted  2. Patient will demonstrate negative oculomotor special testing/positional testing as indicated by patients Functional Deficits. []? Progressing: []? Met: []? Not Met: []? Adjusted  3.  Patient will return to functional activities including bed mobility without increased symptoms or restriction. []? Progressing: []? Met: []? Not Met: []? Adjusted  4. Patient will be able to bend over without increased symptoms or restriction. []? Progressing: []? Met: []? Not Met: []? Adjusted         ASSESSMENT:  Patient with good tolerance to initiation of exercises today. Patient with some difficulty maintaining gaze on object at faster speeds. Patient able to complete exercises with only slight increased dizziness at end of session. Treatment/Activity Tolerance:  [x] Patient tolerated treatment well [] Patient limited by fatique  [] Patient limited by pain  [] Patient limited by other medical complications  [] Other:     Overall Progression Towards Functional goals/ Treatment Progress Update:  [] Patient is progressing as expected towards functional goals listed. [] Progression is slowed due to complexities/Impairments listed. [] Progression has been slowed due to co-morbidities. [x] Plan just implemented, too soon to assess goals progression <30days   [] Goals require adjustment due to lack of progress  [] Patient is not progressing as expected and requires additional follow up with physician  [] Other    Prognosis for POC: [x] Good [] Fair  [] Poor     Patient requires continued skilled intervention: [x] Yes  [] No        PLAN: Add week 2 of vestibular hypofunction protocol  [] Continue per plan of care [] Alter current plan (see comments)  [x] Plan of care initiated [] Hold pending MD visit [] Discharge    Electronically signed by: Shefali Parsons PT , OMT-C,  [id number]      Note: If patient does not return for scheduled/recommended follow up visits, this note will serve as a discharge from care along with the most recent update on progress.

## 2021-04-15 ENCOUNTER — APPOINTMENT (OUTPATIENT)
Dept: CT IMAGING | Age: 71
End: 2021-04-15
Payer: MEDICARE

## 2021-04-15 ENCOUNTER — HOSPITAL ENCOUNTER (EMERGENCY)
Age: 71
Discharge: HOME | End: 2021-04-15
Attending: EMERGENCY MEDICINE
Payer: MEDICARE

## 2021-04-15 VITALS
HEART RATE: 74 BPM | SYSTOLIC BLOOD PRESSURE: 135 MMHG | DIASTOLIC BLOOD PRESSURE: 70 MMHG | WEIGHT: 215.39 LBS | RESPIRATION RATE: 16 BRPM | TEMPERATURE: 98.2 F | OXYGEN SATURATION: 97 % | HEIGHT: 69 IN | BODY MASS INDEX: 31.9 KG/M2

## 2021-04-15 DIAGNOSIS — K57.32 DIVERTICULITIS OF COLON: Primary | ICD-10-CM

## 2021-04-15 LAB
A/G RATIO: 1.6 (ref 1.1–2.2)
ALBUMIN SERPL-MCNC: 4.3 G/DL (ref 3.4–5)
ALP BLD-CCNC: 72 U/L (ref 40–129)
ALT SERPL-CCNC: 19 U/L (ref 10–40)
ANION GAP SERPL CALCULATED.3IONS-SCNC: 12 MMOL/L (ref 3–16)
AST SERPL-CCNC: 16 U/L (ref 15–37)
BASOPHILS ABSOLUTE: 0 K/UL (ref 0–0.2)
BASOPHILS RELATIVE PERCENT: 0 %
BILIRUB SERPL-MCNC: 1.2 MG/DL (ref 0–1)
BILIRUBIN URINE: NEGATIVE
BLOOD, URINE: NEGATIVE
BUN BLDV-MCNC: 19 MG/DL (ref 7–20)
CALCIUM SERPL-MCNC: 9.7 MG/DL (ref 8.3–10.6)
CHLORIDE BLD-SCNC: 105 MMOL/L (ref 99–110)
CLARITY: CLEAR
CO2: 21 MMOL/L (ref 21–32)
COLOR: YELLOW
CREAT SERPL-MCNC: 0.9 MG/DL (ref 0.8–1.3)
EOSINOPHILS ABSOLUTE: 0.1 K/UL (ref 0–0.6)
EOSINOPHILS RELATIVE PERCENT: 1.8 %
GFR AFRICAN AMERICAN: >60
GFR NON-AFRICAN AMERICAN: >60
GLOBULIN: 2.7 G/DL
GLUCOSE BLD-MCNC: 115 MG/DL (ref 70–99)
GLUCOSE URINE: NEGATIVE MG/DL
HCT VFR BLD CALC: 46.2 % (ref 40.5–52.5)
HEMOGLOBIN: 15.6 G/DL (ref 13.5–17.5)
KETONES, URINE: NEGATIVE MG/DL
LEUKOCYTE ESTERASE, URINE: NEGATIVE
LIPASE: 20 U/L (ref 13–60)
LYMPHOCYTES ABSOLUTE: 0.6 K/UL (ref 1–5.1)
LYMPHOCYTES RELATIVE PERCENT: 7.4 %
MCH RBC QN AUTO: 31.3 PG (ref 26–34)
MCHC RBC AUTO-ENTMCNC: 33.8 G/DL (ref 31–36)
MCV RBC AUTO: 92.5 FL (ref 80–100)
MICROSCOPIC EXAMINATION: NORMAL
MONOCYTES ABSOLUTE: 0.3 K/UL (ref 0–1.3)
MONOCYTES RELATIVE PERCENT: 3.9 %
NEUTROPHILS ABSOLUTE: 7.3 K/UL (ref 1.7–7.7)
NEUTROPHILS RELATIVE PERCENT: 86.9 %
NITRITE, URINE: NEGATIVE
PDW BLD-RTO: 12.5 % (ref 12.4–15.4)
PH UA: 5.5 (ref 5–8)
PLATELET # BLD: 151 K/UL (ref 135–450)
PMV BLD AUTO: 9.6 FL (ref 5–10.5)
POTASSIUM REFLEX MAGNESIUM: 4.3 MMOL/L (ref 3.5–5.1)
PROTEIN UA: NEGATIVE MG/DL
RBC # BLD: 4.99 M/UL (ref 4.2–5.9)
SODIUM BLD-SCNC: 138 MMOL/L (ref 136–145)
SPECIFIC GRAVITY UA: >=1.03 (ref 1–1.03)
TOTAL PROTEIN: 7 G/DL (ref 6.4–8.2)
URINE REFLEX TO CULTURE: NORMAL
URINE TYPE: NORMAL
UROBILINOGEN, URINE: 0.2 E.U./DL
WBC # BLD: 8.3 K/UL (ref 4–11)

## 2021-04-15 PROCEDURE — 83690 ASSAY OF LIPASE: CPT

## 2021-04-15 PROCEDURE — 85025 COMPLETE CBC W/AUTO DIFF WBC: CPT

## 2021-04-15 PROCEDURE — 99284 EMERGENCY DEPT VISIT MOD MDM: CPT

## 2021-04-15 PROCEDURE — 74177 CT ABD & PELVIS W/CONTRAST: CPT

## 2021-04-15 PROCEDURE — 80053 COMPREHEN METABOLIC PANEL: CPT

## 2021-04-15 PROCEDURE — 6360000002 HC RX W HCPCS: Performed by: EMERGENCY MEDICINE

## 2021-04-15 PROCEDURE — 6370000000 HC RX 637 (ALT 250 FOR IP): Performed by: EMERGENCY MEDICINE

## 2021-04-15 PROCEDURE — 36415 COLL VENOUS BLD VENIPUNCTURE: CPT

## 2021-04-15 PROCEDURE — 96374 THER/PROPH/DIAG INJ IV PUSH: CPT

## 2021-04-15 PROCEDURE — 6360000004 HC RX CONTRAST MEDICATION: Performed by: EMERGENCY MEDICINE

## 2021-04-15 PROCEDURE — 81003 URINALYSIS AUTO W/O SCOPE: CPT

## 2021-04-15 RX ORDER — METRONIDAZOLE 500 MG/1
500 TABLET ORAL 3 TIMES DAILY
Qty: 30 TABLET | Refills: 0 | Status: SHIPPED | OUTPATIENT
Start: 2021-04-15 | End: 2021-04-25

## 2021-04-15 RX ORDER — KETOROLAC TROMETHAMINE 30 MG/ML
15 INJECTION, SOLUTION INTRAMUSCULAR; INTRAVENOUS ONCE
Status: COMPLETED | OUTPATIENT
Start: 2021-04-15 | End: 2021-04-15

## 2021-04-15 RX ORDER — METRONIDAZOLE 500 MG/1
500 TABLET ORAL ONCE
Status: COMPLETED | OUTPATIENT
Start: 2021-04-15 | End: 2021-04-15

## 2021-04-15 RX ADMIN — IOPAMIDOL 100 ML: 755 INJECTION, SOLUTION INTRAVENOUS at 11:33

## 2021-04-15 RX ADMIN — KETOROLAC TROMETHAMINE 15 MG: 30 INJECTION, SOLUTION INTRAMUSCULAR at 12:11

## 2021-04-15 RX ADMIN — METRONIDAZOLE 500 MG: 500 TABLET ORAL at 12:13

## 2021-04-15 ASSESSMENT — PAIN SCALES - GENERAL
PAINLEVEL_OUTOF10: 5
PAINLEVEL_OUTOF10: 4

## 2021-04-15 ASSESSMENT — PAIN DESCRIPTION - LOCATION: LOCATION: ABDOMEN

## 2021-04-15 ASSESSMENT — PAIN DESCRIPTION - ORIENTATION: ORIENTATION: LEFT;LOWER

## 2021-04-15 ASSESSMENT — PAIN DESCRIPTION - DESCRIPTORS: DESCRIPTORS: BURNING;CONSTANT

## 2021-04-15 NOTE — ED TRIAGE NOTES
Left lower quadrant pain onset last night. Pt describes as a \"burning\" pain. Pt says last night it was much worse and it has eased off today. Pt denies flank pain or pain with urination. Pt states that his PCP diagnosed him with prostatitis and has him on Bactrim for that. Pt also reports feeling bloated for about a month. Denies chest pain or shortness of breath. No fevers.

## 2021-04-15 NOTE — ED PROVIDER NOTES
[facility]      Pt Name: Darian Mosqueda  MRN: [id number]  [place] 1950  Date of evaluation: 4/15/2021  Provider: Leanne Alexandre MD  CHIEF COMPLAINT  Chief Complaint   Patient presents with    Abdominal Pain     LLQ pain since last night    Bloated     bloated for about a month     HPI  Darian Mosqueda is a 80 y.o. male who presents because of abdominal pain. He reports having bloated feeling of his abdomen for the past 1 month. He does have some ongoing issues with his prostate and intermittently takes prolonged courses of Bactrim. He is currently into the first week of taking Bactrim. He has difficulty with his stream of urine intermittently. He denies any fever or chills. He did noticed that he had a burning type pain in the left lower part of his abdomen yesterday. It is not as prevalent today. He has been able to pass gas and have bowel movements. His appetite is decreased. He has not had nausea or vomiting. Denies any chest pain or shortness of breath. Past surgical history includes umbilical hernia repair. He was referred by his primary doctor to come to the ED to get a CT scan. REVIEW OF SYSTEMS:  No fever, no breathing difficulty, no dysuria, denies testicular pain or scrotal pain or swelling pertinent positives and negatives as per the HPI. All other review of systems reviewed and negative. Nursing notes reviewed.     PAST MEDICAL HISTORY:  Past Medical History:   Diagnosis Date    Asthma     Diverticulitis     Influenza A 01/05/2020    Irritable bowel syndrome     Prostatitis     Seasonal allergies      SURGICAL HISTORY  Past Surgical History:   Procedure Laterality Date    COLONOSCOPY  9/4/2012    Dr. Summer Mendoza, check in 5 years    COLONOSCOPY  09/06/2017    dr rhodes at [facility] [place] [facility] polyp, repeat 5 years   [address]  23/88/2525    umbilical    NASAL SINUS SURGERY  about 1998     MEDICATIONS:  No current facility-administered medications on file prior to encounter.       Current Outpatient Medications on File Prior to Encounter   Medication Sig Dispense Refill    sulfamethoxazole-trimethoprim (BACTRIM DS;SEPTRA DS) 800-160 MG per tablet Take 1 tablet by mouth 2 times daily for 21 days 42 tablet 0    TRULANCE 3 MG TABS Take 3 mg by mouth daily      Magnesium 100 MG CAPS Take 400 mg by mouth daily       Turmeric 500 MG CAPS Take by mouth daily       vitamin C (ASCORBIC ACID) 500 MG tablet Take 1,000 mg by mouth daily      Multiple Vitamins-Minerals (THERAPEUTIC MULTIVITAMIN-MINERALS) tablet Take 1 tablet by mouth daily      glucosamine-chondroitin (GLUCOSAMINE CHONDR COMPLEX) 500-400 MG CAPS Take by mouth daily       magnesium citrate solution Take 296 mLs by mouth once for 1 dose 296 mL 0    loratadine (CLARITIN) 10 MG tablet Take 10 mg by mouth daily      fluticasone (VERAMYST) 27.5 MCG/SPRAY nasal spray 2 sprays by Each Nostril route daily       ALLERGIES  Penicillins  FAMILY HISTORY:  Family History   Problem Relation Age of Onset    Cancer Father         colon    High Blood Pressure Father      SOCIAL HISTORY:  Social History     Tobacco Use    Smoking status: Never Smoker    Smokeless tobacco: Never Used   Substance Use Topics    Alcohol use: Yes     Comment: social use    Drug use: No     IMMUNIZATIONS:  Noncontributory    PHYSICAL EXAM  VITAL SIGNS:  /73   Pulse 88   Temp 98.2 °F (36.8 °C) (Oral)   Resp 16   Ht 5' 9\" (1.753 m)   Wt 215 lb 6.2 oz (97.7 kg)   SpO2 98%   BMI 31.81 kg/m²   Constitutional:  80 y.o. male cooperative, nontoxic  HENT:  Atraumatic, mucous membranes moist  Eyes:   Conjunctiva clear, no icterus  Neck:  Supple, no adenopathy  Cardiovascular:  Regular, no rubs  Thorax & Lungs:  No accessory muscle usage, clear  Abdomen:  Soft, distended, bowel sounds present, mild LLQ tenderness  Back:  No deformity  Genitalia:  Deferred  Rectal:  Deferred  Extremities:  No cyanosis, no edema  Skin:  Warm, dry  Neurologic: Alert, no slurred speech  Psychiatric:  Affect appropriate    DIAGNOSTIC RESULTS:  Labs resulted at the time of this note reviewed.    Results for orders placed or performed during the hospital encounter of 04/15/21   CBC Auto Differential   Result Value Ref Range    WBC 8.3 4.0 - 11.0 K/uL    RBC 4.99 4.20 - 5.90 M/uL    Hemoglobin 15.6 13.5 - 17.5 g/dL    Hematocrit 46.2 40.5 - 52.5 %    MCV 92.5 80.0 - 100.0 fL    MCH 31.3 26.0 - 34.0 pg    MCHC 33.8 31.0 - 36.0 g/dL    RDW 12.5 12.4 - 15.4 %    Platelets 717 727 - 803 K/uL    MPV 9.6 5.0 - 10.5 fL    Neutrophils % 86.9 %    Lymphocytes % 7.4 %    Monocytes % 3.9 %    Eosinophils % 1.8 %    Basophils % 0.0 %    Neutrophils Absolute 7.3 1.7 - 7.7 K/uL    Lymphocytes Absolute 0.6 (L) 1.0 - 5.1 K/uL    Monocytes Absolute 0.3 0.0 - 1.3 K/uL    Eosinophils Absolute 0.1 0.0 - 0.6 K/uL    Basophils Absolute 0.0 0.0 - 0.2 K/uL   Comprehensive Metabolic Panel w/ Reflex to MG   Result Value Ref Range    Sodium 138 136 - 145 mmol/L    Potassium reflex Magnesium 4.3 3.5 - 5.1 mmol/L    Chloride 105 99 - 110 mmol/L    CO2 21 21 - 32 mmol/L    Anion Gap 12 3 - 16    Glucose 115 (H) 70 - 99 mg/dL    BUN 19 7 - 20 mg/dL    CREATININE 0.9 0.8 - 1.3 mg/dL    GFR Non-African American >60 >60    GFR African American >60 >60    Calcium 9.7 8.3 - 10.6 mg/dL    Total Protein 7.0 6.4 - 8.2 g/dL    Albumin 4.3 3.4 - 5.0 g/dL    Albumin/Globulin Ratio 1.6 1.1 - 2.2    Total Bilirubin 1.2 (H) 0.0 - 1.0 mg/dL    Alkaline Phosphatase 72 40 - 129 U/L    ALT 19 10 - 40 U/L    AST 16 15 - 37 U/L    Globulin 2.7 g/dL   Lipase   Result Value Ref Range    Lipase 20.0 13.0 - 60.0 U/L   Urinalysis Reflex to Culture    Specimen: Urine, clean catch   Result Value Ref Range    Color, UA Yellow Straw/Yellow    Clarity, UA Clear Clear    Glucose, Ur Negative Negative mg/dL    Bilirubin Urine Negative Negative    Ketones, Urine Negative Negative mg/dL    Specific Gravity, UA >=1.030 1.005 - 1.030    Blood,

## 2021-04-19 ENCOUNTER — HOSPITAL ENCOUNTER (OUTPATIENT)
Dept: PHYSICAL THERAPY | Age: 71
Setting detail: THERAPIES SERIES
Discharge: HOME | End: 2021-04-19
Payer: MEDICARE

## 2021-04-19 PROCEDURE — 97530 THERAPEUTIC ACTIVITIES: CPT

## 2021-04-19 PROCEDURE — 97112 NEUROMUSCULAR REEDUCATION: CPT

## 2021-04-19 NOTE — FLOWSHEET NOTE
and CT scan was negative. Patient was prescribed meclozine but rarely takes it    SUBJECTIVE: 4/14: notes today he is a little more \"foggy\". Doing alright with HEP  4/19: Patient notes that he recently started 2 new antibiotics which have the side effect of dizziness. Patient states that outside of the meds he has been doing better. OBJECTIVE:  Postural Control Tests:  Clinical Test of Sensory Interaction for Balance (CTSIB) performed in Romberg stance    TIME STRATEGY SWAY    Eyes open, firm surface x30 sec   none    Eyes closed, firm surface x30 sec    none    Eyes open, foam surface x30 sec   none    Eyes closed, foam surface x30 sec  hip mild      Gait: No major deviations noted                 Oculomotor/Vestibular Examination:     Spontaneous nystagmus:       []? Left             []? Right           [x]? Absent  Gaze-Evoked nystagmus with fixation present:              Primary            []? Present       [x]? Absent              Right                []? Present       [x]? Absent              Left                  []? Present       [x]? Absent  VOR Head Thrust Test:          []? Normal       [x]? Abnormal    Comments: (+) with fast beat to the left  VOR Cancellation:                  [x]? Normal       []? Abnormal    Comments:   Smooth Pursuit:                      []? Normal       [x]? Abnormal    Comments: (+) with fast beat to the left  Saccades:                               []? Normal       [x]? Abnormal    Comments: (+) with fast beat to the left  Convergence:                          [x]? Normal       []? Abnormal    Comments:      Positional Testing  R Hallpike-[place] maneuver:               Nystagmus:     []? Yes             [x]? No               []? Duration:                                          []? Direction:                  Vertigo:            []? Yes             [x]? No               []? Duration:   L Hallpike-[place]jum maneuver:              Nystagmus:     []? Yes             [x]?  No []? Duration:                                          []? Direction:                  Vertigo:            [x]? Yes             []? No               []? Duration: pt reported feeling nauseous but nothing else  Supine roll head right:              Nystagmus:     []? Yes             [x]? No               []? Duration:                                          []? Direction:                  Vertigo:            []? Yes             [x]? No               []? Duration:   Supine roll head left:              Nystagmus:     []? Yes             [x]? No               []? Duration:                                          []? Direction:                  Vertigo:            []? Yes             [x]?  No               []? Duration:       RESTRICTIONS/PRECAUTIONS: none    Exercises/Interventions:     Therapeutic Exercises (68778) Min: Resistance/Reps Notes/Cues                            Therapeutic Activities (59936) Min:10     Step up and down off air ex 10x L/R    Balance on gumdrops x1 min    Rockerboard f/b, s/s x1 min each      Advanced Gait:  *with head turns/nods  *marching  *backwards  *with eyes closed  *while tossing ball from one hand to another   28' x2   28' x2  28' x2  28' x2  35 x2    Quick 90 degree turns 10x L/R         Neuromuscular Re-ed (12820) Min:30     Type II viewing x5 mins   Jeremi Gene Ex    AirEx:     *NBOS with EC     *NBOS with head turns/nods    *NBOS with eyes following ball    *NBOS with eyes and head following ball  *tandem stance   x1 min  x1 min each     x1 min     x1 min    x1 min L/R    Seated quick head pitch with focus on PT 10x L/R    Sit to stand with eyes closed 10x    Seated bend down and touch gumdrops L/R/C 5x each          Manual Intervention (44291) Min:                Modalities  Min:                      Other Therapeutic Activities: Pt was educated on PT POC, Diagnosis, Prognosis, pathomechanics as well as frequency and duration of scheduling future physical therapy appointments. Time was also taken on this day to answer all patient questions and participation in PT. Reviewed appointment policy in detail with patient and patient verbalized understanding. Home Exercise Program: Patient was instructed in the following for HEP: vestibular hypofunction protocol week 1-Type I viewing ex and Gray Gayer ex. Patient verbalized/demonstrated understanding and was issued written handout. Therapeutic Exercise and NMR EXR  [] (12319) Provided verbal/tactile cueing for activities related to strengthening, flexibility, endurance, ROM for improvements in LE, proximal hip, and core control with self care, mobility, lifting, ambulation. [x] (55310) Provided verbal/tactile cueing for activities related to improving balance, coordination, kinesthetic sense, posture, motor skill, proprioception  to assist with LE, proximal hip, and core control in self care, mobility, lifting, ambulation and eccentric single leg control.  [address] and Therapeutic Activities:    [x] (93531 or 84014) Provided verbal/tactile cueing for activities related to improving balance, coordination, kinesthetic sense, posture, motor skill, proprioception and motor activation to allow for proper function of core, proximal hip and LE with self care and ADLs and functional mobility.   [] (07729) Gait Re-education- Provided training and instruction to the patient for proper LE, core and proximal hip recruitment and positioning and eccentric body weight control with ambulation re-education including up and down stairs     Home Exercise Program:    [x] (81944) Reviewed/Progressed HEP activities related to strengthening, flexibility, endurance, ROM of core, proximal hip and LE for functional self-care, mobility, lifting and ambulation/stair navigation   [] (75966)Reviewed/Progressed HEP activities related to improving balance, coordination, kinesthetic sense, posture, motor skill, proprioception of core, proximal hip and LE for self care, mobility, lifting, and ambulation/stair navigation      Manual Treatments:  PROM / STM / Oscillations-Mobs:  G-I, II, III, IV (PA's, Inf., Post.)  [] (34948) Provided manual therapy to mobilize LE, proximal hip and/or LS spine soft tissue/joints for the purpose of modulating pain, promoting relaxation,  increasing ROM, reducing/eliminating soft tissue swelling/inflammation/restriction, improving soft tissue extensibility and allowing for proper ROM for normal function with self care, mobility, lifting and ambulation. Charges:  Timed Code Treatment Minutes: 40   Total Treatment Minutes: 40      [] EVAL (LOW) 18580 (typically 20 minutes face-to-face)  [] EVAL (MOD) 73530 (typically 30 minutes face-to-face)  [] EVAL (HIGH) 14462 (typically 45 minutes face-to-face)  [] RE-EVAL     [] OO(21651) x     [] Dry needle 1 or 2 Muscles (93647)  [x] NMR (25575) x  2   [] Dry needle 3+ Muscles (49829)  [] Manual (07411) x     [] Ultrasound (80196) x  [x] TA (92623) x     [] Mech Traction (35959)  [] ES(attended) (93451)     [] ES (un) (21202):   [] Vasopump (31329) [] Ionto (75375)   [] Other:    GOALS:  Patient stated goal: \"be able to move around without dizziness\"  []? Progressing: []? Met: []? Not Met: []? Adjusted     Therapist goals for Patient:   Short Term Goals: To be achieved in: 2 weeks  1. Independent in HEP and progression per patient tolerance, in order to prevent re-injury. []? Progressing: []? Met: []? Not Met: []? Adjusted  2. Patient will have a decrease in dizziness/imbalance/symptoms by 40% to facilitate improvement in movement, function, balance and ADLs as indicated by Functional Deficits. []? Progressing: []? Met: []? Not Met: []? Adjusted     Long Term Goals: To be achieved in: by discharge  1. Disability index score of 25% or less for the [facility] to assist with reaching prior level of function. []? Progressing: []? Met: []? Not Met: []? Adjusted  2.  Patient will demonstrate negative oculomotor special testing/positional testing as indicated by patients Functional Deficits. []? Progressing: []? Met: []? Not Met: []? Adjusted  3. Patient will return to functional activities including bed mobility without increased symptoms or restriction. []? Progressing: []? Met: []? Not Met: []? Adjusted  4. Patient will be able to bend over without increased symptoms or restriction. []? Progressing: []? Met: []? Not Met: []? Adjusted         ASSESSMENT:  Patient with good tolerance to initiation of exercises today. Patient with some difficulty maintaining gaze on object at faster speeds. Patient able to complete exercises with only slight increased dizziness at end of session. Treatment/Activity Tolerance:  [x] Patient tolerated treatment well [] Patient limited by fatique  [] Patient limited by pain  [] Patient limited by other medical complications  [] Other:     Overall Progression Towards Functional goals/ Treatment Progress Update:  [] Patient is progressing as expected towards functional goals listed. [] Progression is slowed due to complexities/Impairments listed. [] Progression has been slowed due to co-morbidities. [x] Plan just implemented, too soon to assess goals progression <30days   [] Goals require adjustment due to lack of progress  [] Patient is not progressing as expected and requires additional follow up with physician  [] Other    Prognosis for POC: [x] Good [] Fair  [] Poor     Patient requires continued skilled intervention: [x] Yes  [] No        PLAN: Add week 3 of vestibular hypofunction protocol  [x] Continue per plan of care [] Alter current plan (see comments)  [] Plan of care initiated [] Hold pending MD visit [] Discharge    Electronically signed by: Josias Desai PT , OMT-C,  [id number]      Note: If patient does not return for scheduled/recommended follow up visits, this note will serve as a discharge from care along with the most recent update on progress.

## 2021-04-22 ENCOUNTER — APPOINTMENT (OUTPATIENT)
Dept: PHYSICAL THERAPY | Age: 71
End: 2021-04-22
Payer: MEDICARE

## 2021-04-26 ENCOUNTER — HOSPITAL ENCOUNTER (OUTPATIENT)
Dept: PHYSICAL THERAPY | Age: 71
Setting detail: THERAPIES SERIES
Discharge: HOME | End: 2021-04-26
Payer: MEDICARE

## 2021-04-26 PROCEDURE — 97112 NEUROMUSCULAR REEDUCATION: CPT

## 2021-04-26 PROCEDURE — 97530 THERAPEUTIC ACTIVITIES: CPT

## 2021-04-26 NOTE — FLOWSHEET NOTE
and CT scan was negative. Patient was prescribed meclozine but rarely takes it    SUBJECTIVE: 4/14: notes today he is a little more \"foggy\". Doing alright with HEP  4/19: Patient notes that he recently started 2 new antibiotics which have the side effect of dizziness. Patient states that outside of the meds he has been doing better. 4/26:   No longer has any fogginess with daily activities. Notes the only time he seems to get dizzy anymore is if he turns his head too fast or bends over and gets up too fast. Patient states the frequency with which this happens is decreasing as well. OBJECTIVE:  Postural Control Tests:  Clinical Test of Sensory Interaction for Balance (CTSIB) performed in Romberg stance    TIME STRATEGY SWAY    Eyes open, firm surface x30 sec   none    Eyes closed, firm surface x30 sec    none    Eyes open, foam surface x30 sec   none    Eyes closed, foam surface x30 sec  hip mild      Gait: No major deviations noted                 Oculomotor/Vestibular Examination:     Spontaneous nystagmus:       []? Left             []? Right           [x]? Absent  Gaze-Evoked nystagmus with fixation present:              Primary            []? Present       [x]? Absent              Right                []? Present       [x]? Absent              Left                  []? Present       [x]? Absent  VOR Head Thrust Test:          []? Normal       [x]? Abnormal    Comments: (+) with fast beat to the left  VOR Cancellation:                  [x]? Normal       []? Abnormal    Comments:   Smooth Pursuit:                      []? Normal       [x]? Abnormal    Comments: (+) with fast beat to the left  Saccades:                               []? Normal       [x]? Abnormal    Comments: (+) with fast beat to the left  Convergence:                          [x]? Normal       []? Abnormal    Comments:      Positional Testing  R Hallpike-Timber maneuver:               Nystagmus:     []? Yes             [x]?  No []? Duration:                                          []? Direction:                  Vertigo:            []? Yes             [x]? No               []? Duration:   L Hallpike-[place] maneuver:              Nystagmus:     []? Yes             [x]? No               []? Duration:                                          []? Direction:                  Vertigo:            [x]? Yes             []? No               []? Duration: pt reported feeling nauseous but nothing else  Supine roll head right:              Nystagmus:     []? Yes             [x]? No               []? Duration:                                          []? Direction:                  Vertigo:            []? Yes             [x]? No               []? Duration:   Supine roll head left:              Nystagmus:     []? Yes             [x]? No               []? Duration:                                          []? Direction:                  Vertigo:            []? Yes             [x]?  No               []? Duration:       RESTRICTIONS/PRECAUTIONS: none    Exercises/Interventions:     Therapeutic Exercises (22060) Min: Resistance/Reps Notes/Cues                            Therapeutic Activities (21500) Min:10     Step up and down off air ex 10x L/R    Balance on gumdrops x1 min    Rockerboard f/b, s/s x1 min each      Advanced Gait:  *with head turns/nods  *marching  *backwards  *with eyes closed  *while tossing ball from one hand to another   28' x2   28' x2  28' x2  28' x2  35 x2    Quick 90 degree turns 10x L/R         Neuromuscular Re-ed (70342) Min:30     Imaginary target x5 mins   Johnathan French Ex    AirEx:     *NBOS with EC     *NBOS with head turns/nods    *NBOS with eyes following ball    *NBOS with eyes and head following ball  *tandem stance   x1 min  x1 min each     x1 min     x1 min    x1 min L/R    Seated quick head pitch with focus on PT 10x L/R    Sit to stand with eyes closed 10x    Seated bend down and touch gumdrops L/R/C 5x each Manual Intervention (28245) Min:                Modalities  Min:                      Other Therapeutic Activities: Pt was educated on PT POC, Diagnosis, Prognosis, pathomechanics as well as frequency and duration of scheduling future physical therapy appointments. Time was also taken on this day to answer all patient questions and participation in PT. Reviewed appointment policy in detail with patient and patient verbalized understanding. Home Exercise Program: Patient was instructed in the following for HEP: vestibular hypofunction protocol week 1-Type I viewing ex and Elyn Cookson ex. Patient verbalized/demonstrated understanding and was issued written handout. 4/26: Patient issued week 3 of vestibular hypofunction protocol. Patient verbalized/demonstrated understanding and was issued written handout. Therapeutic Exercise and NMR EXR  [] (25246) Provided verbal/tactile cueing for activities related to strengthening, flexibility, endurance, ROM for improvements in LE, proximal hip, and core control with self care, mobility, lifting, ambulation. [x] (02404) Provided verbal/tactile cueing for activities related to improving balance, coordination, kinesthetic sense, posture, motor skill, proprioception  to assist with LE, proximal hip, and core control in self care, mobility, lifting, ambulation and eccentric single leg control.  [address] and Therapeutic Activities:    [x] (44584 or 51223) Provided verbal/tactile cueing for activities related to improving balance, coordination, kinesthetic sense, posture, motor skill, proprioception and motor activation to allow for proper function of core, proximal hip and LE with self care and ADLs and functional mobility.   [] (81561) Gait Re-education- Provided training and instruction to the patient for proper LE, core and proximal hip recruitment and positioning and eccentric body weight control with ambulation re-education including up and down stairs     Home Exercise Program:    [x] (63850) Reviewed/Progressed HEP activities related to strengthening, flexibility, endurance, ROM of core, proximal hip and LE for functional self-care, mobility, lifting and ambulation/stair navigation   [] (51780)Reviewed/Progressed HEP activities related to improving balance, coordination, kinesthetic sense, posture, motor skill, proprioception of core, proximal hip and LE for self care, mobility, lifting, and ambulation/stair navigation      Manual Treatments:  PROM / STM / Oscillations-Mobs:  G-I, II, III, IV (PA's, Inf., Post.)  [] (70320) Provided manual therapy to mobilize LE, proximal hip and/or LS spine soft tissue/joints for the purpose of modulating pain, promoting relaxation,  increasing ROM, reducing/eliminating soft tissue swelling/inflammation/restriction, improving soft tissue extensibility and allowing for proper ROM for normal function with self care, mobility, lifting and ambulation. Charges:  Timed Code Treatment Minutes: 40   Total Treatment Minutes: 40      [] EVAL (LOW) 04753 (typically 20 minutes face-to-face)  [] EVAL (MOD) 66306 (typically 30 minutes face-to-face)  [] EVAL (HIGH) 90190 (typically 45 minutes face-to-face)  [] RE-EVAL     [] VY(77898) x     [] Dry needle 1 or 2 Muscles (92123)  [x] NMR (52356) x  2   [] Dry needle 3+ Muscles (12953)  [] Manual (95790) x     [] Ultrasound (32371) x  [x] TA (63700) x     [] Mech Traction (45893)  [] ES(attended) (93940)     [] ES (un) (03978):   [] Vasopump (56500) [] Ionto (73534)   [] Other:    GOALS:  Patient stated goal: \"be able to move around without dizziness\"  []? Progressing: []? Met: []? Not Met: []? Adjusted     Therapist goals for Patient:   Short Term Goals: To be achieved in: 2 weeks  1. Independent in HEP and progression per patient tolerance, in order to prevent re-injury. []? Progressing: []? Met: []? Not Met: []? Adjusted  2.  Patient will have a decrease in dizziness/imbalance/symptoms by 40% to facilitate improvement in movement, function, balance and ADLs as indicated by Functional Deficits. []? Progressing: []? Met: []? Not Met: []? Adjusted     Long Term Goals: To be achieved in: by discharge  1. Disability index score of 25% or less for the [facility] to assist with reaching prior level of function. []? Progressing: []? Met: []? Not Met: []? Adjusted  2. Patient will demonstrate negative oculomotor special testing/positional testing as indicated by patients Functional Deficits. []? Progressing: []? Met: []? Not Met: []? Adjusted  3. Patient will return to functional activities including bed mobility without increased symptoms or restriction. []? Progressing: []? Met: []? Not Met: []? Adjusted  4. Patient will be able to bend over without increased symptoms or restriction. []? Progressing: []? Met: []? Not Met: []? Adjusted         ASSESSMENT:  Patient with good tolerance to initiation of exercises today. Patient with some difficulty maintaining gaze on object at faster speeds. Patient able to complete exercises with no increased dizziness at end of session. Treatment/Activity Tolerance:  [x] Patient tolerated treatment well [] Patient limited by fatique  [] Patient limited by pain  [] Patient limited by other medical complications  [] Other:     Overall Progression Towards Functional goals/ Treatment Progress Update:  [] Patient is progressing as expected towards functional goals listed. [] Progression is slowed due to complexities/Impairments listed. [] Progression has been slowed due to co-morbidities. [x] Plan just implemented, too soon to assess goals progression <30days   [] Goals require adjustment due to lack of progress  [] Patient is not progressing as expected and requires additional follow up with physician  [] Other    Prognosis for POC: [x] Good [] Fair  [] Poor     Patient requires continued skilled intervention: [x] Yes  [] No        PLAN: Decrease to 1x/week.   Add vestibular hypofunction week 4 protocol next visit. [x] Continue per plan of care [] Alter current plan (see comments)  [] Plan of care initiated [] Hold pending MD visit [] Discharge    Electronically signed by: Mindy Pérez PT , OMT-C,  [id number]      Note: If patient does not return for scheduled/recommended follow up visits, this note will serve as a discharge from care along with the most recent update on progress.

## 2021-04-29 ENCOUNTER — APPOINTMENT (OUTPATIENT)
Dept: PHYSICAL THERAPY | Age: 71
End: 2021-04-29
Payer: MEDICARE

## 2021-04-30 ENCOUNTER — OFFICE VISIT (OUTPATIENT)
Dept: FAMILY MEDICINE CLINIC | Age: 71
End: 2021-04-30
Payer: MEDICARE

## 2021-04-30 VITALS
HEIGHT: 69 IN | DIASTOLIC BLOOD PRESSURE: 78 MMHG | TEMPERATURE: 98.2 F | BODY MASS INDEX: 30.96 KG/M2 | SYSTOLIC BLOOD PRESSURE: 122 MMHG | WEIGHT: 209 LBS

## 2021-04-30 DIAGNOSIS — Z09 HOSPITAL DISCHARGE FOLLOW-UP: Primary | ICD-10-CM

## 2021-04-30 DIAGNOSIS — K57.92 DIVERTICULITIS: ICD-10-CM

## 2021-04-30 DIAGNOSIS — L91.8 ACQUIRED SKIN TAG: ICD-10-CM

## 2021-04-30 DIAGNOSIS — N41.9 PROSTATITIS, UNSPECIFIED PROSTATITIS TYPE: ICD-10-CM

## 2021-04-30 PROCEDURE — 1036F TOBACCO NON-USER: CPT | Performed by: FAMILY MEDICINE

## 2021-04-30 PROCEDURE — 99213 OFFICE O/P EST LOW 20 MIN: CPT | Performed by: FAMILY MEDICINE

## 2021-04-30 PROCEDURE — 3017F COLORECTAL CA SCREEN DOC REV: CPT | Performed by: FAMILY MEDICINE

## 2021-04-30 PROCEDURE — 1123F ACP DISCUSS/DSCN MKR DOCD: CPT | Performed by: FAMILY MEDICINE

## 2021-04-30 PROCEDURE — 4040F PNEUMOC VAC/ADMIN/RCVD: CPT | Performed by: FAMILY MEDICINE

## 2021-04-30 PROCEDURE — G8427 DOCREV CUR MEDS BY ELIG CLIN: HCPCS | Performed by: FAMILY MEDICINE

## 2021-04-30 PROCEDURE — G8417 CALC BMI ABV UP PARAM F/U: HCPCS | Performed by: FAMILY MEDICINE

## 2021-04-30 NOTE — PATIENT INSTRUCTIONS
See dr Lyndon Sagastume for evaluation of the prostate and the prostate calcium deposits   Do consider getting the colon check early with dr Dipti Multani   See dr Tommy Fournier

## 2021-04-30 NOTE — PROGRESS NOTES
Subjective:      Patient ID: Mauri Martinez is a 80 y.o. male. Chief Complaint   Patient presents with    Follow-up     diverticulitis, prostate        Patient presents with: Follow-up: diverticulitis, prostate    He tells me he is better with the dizzy and still little there   But the therapy has helped a lot  He has had diverticulitis in the past    No fever no abdomen pain  bm ok. No blood   Urine flow is decreased stream    YOB: 1950    Date of Visit:  4/30/2021     -- Penicillins -- Hives    Current Outpatient Medications:  TRULANCE 3 MG TABS, Take 3 mg by mouth daily, Disp: , Rfl:   Magnesium 100 MG CAPS, Take 400 mg by mouth daily , Disp: , Rfl:   Turmeric 500 MG CAPS, Take by mouth daily , Disp: , Rfl:   vitamin C (ASCORBIC ACID) 500 MG tablet, Take 1,000 mg by mouth daily, Disp: , Rfl:   loratadine (CLARITIN) 10 MG tablet, Take 10 mg by mouth daily, Disp: , Rfl:    fluticasone (VERAMYST) 27.5 MCG/SPRAY nasal spray, 2 sprays by Each Nostril route daily, Disp: , Rfl:   Multiple Vitamins-Minerals (THERAPEUTIC MULTIVITAMIN-MINERALS) tablet, Take 1 tablet by mouth daily, Disp: , Rfl:   glucosamine-chondroitin (GLUCOSAMINE CHONDR COMPLEX) 500-400 MG CAPS, Take by mouth daily , Disp: , Rfl:   magnesium citrate solution, Take 296 mLs by mouth once for 1 dose, Disp: 296 mL, Rfl: 0    No current facility-administered medications for this visit.       ---------------------------               04/30/21                      1406         ---------------------------   BP:          122/78         Site:    Left Upper Arm     Position:     Sitting        Cuff Size:   Large Adult      Temp:   98.2 °F (36.8 °C)   TempSrc:    Temporal        Weight: 209 lb (94.8 kg)    Height:  5' 9\" (1.753 m)   ---------------------------  Body mass index is 30.86 kg/m².      Wt Readings from Last 3 Encounters:  04/30/21 : 209 lb (94.8 kg)  04/15/21 : 215 lb 6.2 oz (97.7 kg)  04/06/21 : 222 lb (100.7 kg)    BP Readings from Last 3 Encounters:  04/30/21 : 122/78  04/15/21 : 135/70  04/06/21 : 128/82            Review of Systems    Objective:   Physical Exam  Constitutional:       General: He is not in acute distress. Appearance: Normal appearance. He is not ill-appearing. Pulmonary:      Effort: Pulmonary effort is normal.      Breath sounds: Normal breath sounds. No decreased breath sounds, wheezing, rhonchi or rales. Abdominal:      General: Abdomen is flat. Bowel sounds are normal. There is no abdominal bruit. Palpations: Abdomen is soft. There is no hepatomegaly, splenomegaly, mass or pulsatile mass. Tenderness: There is no abdominal tenderness. There is no right CVA tenderness, left CVA tenderness or guarding. Neurological:      Mental Status: He is alert. Assessment:        Diagnosis Orders   1. Hospital discharge follow-up     2. Diverticulitis     3.  Prostatitis, unspecified prostatitis type       He has good size skin tags that he sits on and can get uncomfortable   Reviewed the ER visit labs and ct   On 4/15 cmp, cbc ok   Ct abd same date showed the diverticulitis and the prostate findings   ER note reviewed on 4/15      Plan:      See dr Mirella Garcia for evaluation of the prostate and the prostate calcium deposits   Do consider getting the colon check early with dr Agnes Danielle   See dr Myriam Granados MD

## 2021-05-03 ENCOUNTER — HOSPITAL ENCOUNTER (OUTPATIENT)
Dept: PHYSICAL THERAPY | Age: 71
Setting detail: THERAPIES SERIES
Discharge: HOME | End: 2021-05-03
Payer: MEDICARE

## 2021-05-03 PROCEDURE — 97112 NEUROMUSCULAR REEDUCATION: CPT

## 2021-05-03 PROCEDURE — 97530 THERAPEUTIC ACTIVITIES: CPT

## 2021-05-03 NOTE — PLAN OF CARE
[place] [facility], [facility]  [address] [facility]  Phone: [phone]   Fax:     [phone]       Physical Therapy Discharge Summary    Dear  Dr. Ana Almendarez,    We had the pleasure of treating the following patient for physical therapy services at [address]. A summary of our findings can be found in the discharge summary below. If you have any questions or concerns regarding these findings, please do not hesitate to contact me at the office phone number above. Thank you for the referral.     Physician Signature:________________________________Date:__________________  By signing above (or electronic signature), therapists plan is approved by physician      Overall Response to Treatment:   [x]Patient is responding well to treatment and improvement is noted with regards  to goals   [x]Patient should continue to improve in reasonable time if they continue HEP   []Patient has plateaued and is no longer responding to skilled PT intervention    []Patient is getting worse and would benefit from return to referring MD   []Patient unable to adhere to initial POC   []Other:    ASSESSMENT:  Patient with good response to vestibular rehab. Patient notes 80% improvement with dizziness symptoms overall. Patient able to complete high level balance activities with minimal difficulty. Patient has demonstrates improvement with his VOR gain as well. Believe patient is appropriate to continue with HEP at this time.      Date range of Visits: 21 to 5/3/21  Total Visits: 5    Physical Therapy Treatment Note/ Progress Report:   Date:  5/3/2021    Patient Name:  Darian Mosqueda    :  1950  MRN: [id number]    Pertinent Medical History:  asthma    Medical/Treatment Diagnosis Information:  · Diagnosis: Vertigo  · Treatment Diagnosis: Vestibular impairment, Decreased balance    Insurance/Certification information:  PT Insurance Information: Medicare  Physician Information:  Referring Practitioner: Raymundo Alcantara MD  Plan of care signed (Y/N): [x]  Yes []  No     Date of Patient follow up with Physician: CHASIDY     Progress Report: [x]  Yes  []  No     Date Range for reporting period:  Beginnin2021  Endin/3/21    Progress report due (10 Rx/or 30 days whichever is less):  3/42/45    Recertification due (POC duration/ or 90 days whichever is less):  21    Visit # Insurance Allowable Auth required?  BOMN []  Yes [x]  No     Latex Allergy:  [x]NO      []YES  Preferred Language for Healthcare:   [x]English       []other:    Functional Outcomes Measure:   Date Assessed: 5/3/21  Test: [facility]  Score: 4    Dizziness level:  3/10     History of Injury:Patient reports onset of dizziness about 3 months ago. Patient notes his first attack was when he stood he was off balance and ran into walls. Patient notes that 2 months ago he could barely move around without feeling like he wanted to throw up. He has been performing epley maneuver on his own. Notes he is doing better but still gets dizzy. Patient notes his dizziness is aggravated with quick movement of the head, bending over, and performing sit to stand. Patient notes when he gets episodes the room does not spin but he feels like Alvarez Ball is somewhere else\" and is lightheaded. Was treated at the ER in the beginning for his dizzy and CT scan was negative. Patient was prescribed meclozine but rarely takes it    SUBJECTIVE: : notes today he is a little more \"foggy\". Doing alright with HEP  : Patient notes that he recently started 2 new antibiotics which have the side effect of dizziness. Patient states that outside of the meds he has been doing better. :   No longer has any fogginess with daily activities.   Notes the only time he seems to get dizzy anymore is if he turns his head too fast or bends over and gets up too fast. Patient states the frequency with which this happens is decreasing as well. 5/3: Patient reports 80% improvement overall with dizziness. Continues to improve when turning head quickly and bending over per pt. OBJECTIVE: Updated 5/3/21  Postural Control Tests:  Clinical Test of Sensory Interaction for Balance (CTSIB) performed in Romberg stance    TIME STRATEGY SWAY    Eyes open, firm surface x30 sec   none    Eyes closed, firm surface x30 sec    none    Eyes open, foam surface x30 sec   none    Eyes closed, foam surface x30 sec  ankle Very slight      Gait: No major deviations noted                 Oculomotor/Vestibular Examination:     Spontaneous nystagmus:       []? Left             []? Right           [x]? Absent  Gaze-Evoked nystagmus with fixation present:              Primary            []? Present       [x]? Absent              Right                []? Present       [x]? Absent              Left                  []? Present       [x]? Absent  VOR Head Thrust Test:          []? Normal       [x]? Abnormal    Comments: (+) with fast beat to the left  VOR Cancellation:                  [x]? Normal       []? Abnormal    Comments:   Smooth Pursuit:                      []? Normal       [x]? Abnormal    Comments: (+) with fast beat to the left  Saccades:                               []? Normal       [x]? Abnormal    Comments: (+) with fast beat to the left  Convergence:                          [x]? Normal       []? Abnormal    Comments:      Positional Testing  R Hallpike-Smithwick maneuver:               Nystagmus:     []? Yes             [x]? No               []? Duration:                                          []? Direction:                  Vertigo:            []? Yes             [x]? No               []? Duration:   L Hallpike-Anjum maneuver:              Nystagmus:     []? Yes             [x]?  No               []? Duration:                                          []? Direction: Vertigo:            []? Yes             [x]? No               []? Duration:   Supine roll head right:              Nystagmus:     []? Yes             [x]? No               []? Duration:                                          []? Direction:                  Vertigo:            []? Yes             [x]? No               []? Duration:   Supine roll head left:              Nystagmus:     []? Yes             [x]? No               []? Duration:                                          []? Direction:                  Vertigo:            []? Yes             [x]? No               []? Duration:       RESTRICTIONS/PRECAUTIONS: none    Exercises/Interventions:     Therapeutic Exercises (65687) Min: Resistance/Reps Notes/Cues                            Therapeutic Activities (28292) Min:10     Step up and down off air ex 10x L/R    Balance on gumdrops x1 min    Rockerboard f/b, s/s x1 min each      Advanced Gait:  *with head turns/nods  *marching  *backwards  *with eyes closed  *while tossing ball from one hand to another   28' x2   28' x2  28' x2  28' x2  35 x2    Quick 90 degree turns 10x L/R         Neuromuscular Re-ed (88387) Min:30     Imaginary target x5 mins   Jairon Large Ex    AirEx:     *NBOS with EC     *NBOS with head turns/nods    *NBOS with eyes following ball    *NBOS with eyes and head following ball  *tandem stance   x1 min  x1 min each     x1 min     x1 min    x1 min L/R    Seated quick head pitch with focus on PT 10x L/R    Sit to stand with eyes closed 10x    Seated bend down and touch gumdrops L/R/C 5x each          Manual Intervention (99042) Min:                Modalities  Min:                      Other Therapeutic Activities: Pt was educated on PT POC, Diagnosis, Prognosis, pathomechanics as well as frequency and duration of scheduling future physical therapy appointments. Time was also taken on this day to answer all patient questions and participation in PT.  Reviewed appointment policy in detail with patient and patient verbalized understanding. Home Exercise Program: Patient was instructed in the following for HEP: vestibular hypofunction protocol week 1-Type I viewing ex and Juan Jose Paredes ex. Patient verbalized/demonstrated understanding and was issued written handout. 4/26: Patient issued week 3 of vestibular hypofunction protocol. Patient verbalized/demonstrated understanding and was issued written handout. 5/3: Patient issued week 4 of vestibular hypofunction protocol. Also issued the following balance exercises: tandem stance, NBOS with EC, NBOS with head turns/nods, quick 90 degree turns, tandem stance, and above advanced gait activities. Patient verbalized/demonstrated understanding and was issued written handout. Therapeutic Exercise and NMR EXR  [] (01785) Provided verbal/tactile cueing for activities related to strengthening, flexibility, endurance, ROM for improvements in LE, proximal hip, and core control with self care, mobility, lifting, ambulation. [x] (61486) Provided verbal/tactile cueing for activities related to improving balance, coordination, kinesthetic sense, posture, motor skill, proprioception  to assist with LE, proximal hip, and core control in self care, mobility, lifting, ambulation and eccentric single leg control.  [address] and Therapeutic Activities:    [x] (26883 or 61930) Provided verbal/tactile cueing for activities related to improving balance, coordination, kinesthetic sense, posture, motor skill, proprioception and motor activation to allow for proper function of core, proximal hip and LE with self care and ADLs and functional mobility.   [] (00131) Gait Re-education- Provided training and instruction to the patient for proper LE, core and proximal hip recruitment and positioning and eccentric body weight control with ambulation re-education including up and down stairs     Home Exercise Program:    [x] (50518) Reviewed/Progressed HEP activities related indicated by Functional Deficits. []? Progressing: [x]? Met: []? Not Met: []? Adjusted     Long Term Goals: To be achieved in: by discharge  1. Disability index score of 25% or less for the [facility] to assist with reaching prior level of function. []? Progressing: [x]? Met: []? Not Met: []? Adjusted  2. Patient will demonstrate negative oculomotor special testing/positional testing as indicated by patients Functional Deficits. [x]? Progressing: []? Met: []? Not Met: []? Adjusted  3. Patient will return to functional activities including bed mobility without increased symptoms or restriction. []? Progressing: [x]? Met: []? Not Met: []? Adjusted  4. Patient will be able to bend over without increased symptoms or restriction. []? Progressing: [x]? Met: []? Not Met: []? Adjusted         ASSESSMENT:  Patient with good response to vestibular rehab. Patient notes 80% improvement with dizziness symptoms overall. Patient able to complete high level balance activities with minimal difficulty. Patient has demonstrates improvement with his VOR gain as well. Believe patient is appropriate to continue with HEP at this time. Treatment/Activity Tolerance:  [x] Patient tolerated treatment well [] Patient limited by fatique  [] Patient limited by pain  [] Patient limited by other medical complications  [] Other:     Overall Progression Towards Functional goals/ Treatment Progress Update:  [x] Patient is progressing as expected towards functional goals listed. [] Progression is slowed due to complexities/Impairments listed. [] Progression has been slowed due to co-morbidities.   [] Plan just implemented, too soon to assess goals progression <30days   [] Goals require adjustment due to lack of progress  [] Patient is not progressing as expected and requires additional follow up with physician  [] Other    Prognosis for POC: [x] Good [] Fair  [] Poor     Patient requires continued skilled intervention: [] Yes  [x] No PLAN:   D/C with HEP  [] Continue per plan of care [] Alter current plan (see comments)  [] Plan of care initiated [] Hold pending MD visit [x] Discharge    Electronically signed by: Mey Hedrick PT , OMT-C,  [id number]      Note: If patient does not return for scheduled/recommended follow up visits, this note will serve as a discharge from care along with the most recent update on progress.

## 2021-05-06 ENCOUNTER — OFFICE VISIT (OUTPATIENT)
Dept: SURGERY | Age: 71
End: 2021-05-06
Payer: MEDICARE

## 2021-05-06 ENCOUNTER — APPOINTMENT (OUTPATIENT)
Dept: PHYSICAL THERAPY | Age: 71
End: 2021-05-06
Payer: MEDICARE

## 2021-05-06 DIAGNOSIS — L91.8 SKIN TAGS, MULTIPLE ACQUIRED: Primary | ICD-10-CM

## 2021-05-06 PROCEDURE — 11200 RMVL SKIN TAGS UP TO&INC 15: CPT | Performed by: SURGERY

## 2021-05-11 ENCOUNTER — OFFICE VISIT (OUTPATIENT)
Dept: ENT CLINIC | Age: 71
End: 2021-05-11
Payer: MEDICARE

## 2021-05-11 VITALS
HEART RATE: 65 BPM | HEIGHT: 69 IN | BODY MASS INDEX: 30.51 KG/M2 | DIASTOLIC BLOOD PRESSURE: 74 MMHG | WEIGHT: 206 LBS | SYSTOLIC BLOOD PRESSURE: 109 MMHG | TEMPERATURE: 98 F

## 2021-05-11 DIAGNOSIS — R42 VERTIGO: Primary | ICD-10-CM

## 2021-05-11 DIAGNOSIS — H93.13 TINNITUS OF BOTH EARS: ICD-10-CM

## 2021-05-11 DIAGNOSIS — R42 DIZZINESS: ICD-10-CM

## 2021-05-11 DIAGNOSIS — H90.3 SENSORINEURAL HEARING LOSS (SNHL) OF BOTH EARS: ICD-10-CM

## 2021-05-11 PROCEDURE — 3017F COLORECTAL CA SCREEN DOC REV: CPT | Performed by: STUDENT IN AN ORGANIZED HEALTH CARE EDUCATION/TRAINING PROGRAM

## 2021-05-11 PROCEDURE — G8417 CALC BMI ABV UP PARAM F/U: HCPCS | Performed by: STUDENT IN AN ORGANIZED HEALTH CARE EDUCATION/TRAINING PROGRAM

## 2021-05-11 PROCEDURE — 99213 OFFICE O/P EST LOW 20 MIN: CPT | Performed by: STUDENT IN AN ORGANIZED HEALTH CARE EDUCATION/TRAINING PROGRAM

## 2021-05-11 PROCEDURE — 1123F ACP DISCUSS/DSCN MKR DOCD: CPT | Performed by: STUDENT IN AN ORGANIZED HEALTH CARE EDUCATION/TRAINING PROGRAM

## 2021-05-11 PROCEDURE — 1036F TOBACCO NON-USER: CPT | Performed by: STUDENT IN AN ORGANIZED HEALTH CARE EDUCATION/TRAINING PROGRAM

## 2021-05-11 PROCEDURE — G8427 DOCREV CUR MEDS BY ELIG CLIN: HCPCS | Performed by: STUDENT IN AN ORGANIZED HEALTH CARE EDUCATION/TRAINING PROGRAM

## 2021-05-11 PROCEDURE — 4040F PNEUMOC VAC/ADMIN/RCVD: CPT | Performed by: STUDENT IN AN ORGANIZED HEALTH CARE EDUCATION/TRAINING PROGRAM

## 2021-05-11 NOTE — PROGRESS NOTES
Audiogram from 3/10/2021:    AU: Hearing WNL sloping to Severe SNHL, Excellent WRS, Type A tymps  Update 5/1/2021:    Patient presents today for follow-up. He recently completed vestibular therapy and his symptoms have since resolved. He is essentially back to his baseline. REVIEW OF SYSTEMS  The following systems were reviewed and revealed the following in addition to any already discussed in the HPI:    PHYSICAL EXAM    GENERAL: No acute distress, alert and oriented, no hoarseness, strong voice  EYES: EOMI, Anti-icteric  HENT:   Head: Normocephalic and atraumatic. Face:  Symmetric, facial nerve intact, no sinus tenderness  Right Ear: Normal external ear, normal external auditory canal, intact tympanic membrane with normal mobility and aerated middle ear  Left Ear: Normal external ear, normal external auditory canal, intact tympanic membrane with normal mobility and aerated middle ear  Mouth/Oral Cavity:  normal lips, Uvula is midline, no mucosal lesions, no trismus, normal dentition, normal salivary quality/flow  Oropharynx/Larynx:  normal oropharynx, normal tonsils; patient did not tolerate mirror exam due to excessive gag reflex  Nose:Normal external nasal appearance. Anterior rhinoscopy shows normal a deviated septum preventing view posteriorly. turbinates. Normal mucosa   NECK: Normal range of motion, no thyromegaly, trachea is midline, no lymphadenopathy, no neck masses, no crepitus  CHEST: Normal respiratory effort, no retractions, breathing comfortably  SKIN: No rashes, normal appearing skin, no evidence of skin lesions/tumors  Neuro:  cranial nerve II-XII intact; normal gait  Cardio:  no edema    No evidence of middle ear pathology or vertigo related to the ear. This note was generated completely or in part utilizing Dragon dictation speech recognition software.   Occasionally, words are mistranscribed and despite editing, the text may contain inaccuracies due to incorrect word

## 2022-01-28 ENCOUNTER — OFFICE VISIT (OUTPATIENT)
Dept: FAMILY MEDICINE CLINIC | Age: 72
End: 2022-01-28
Payer: MEDICARE

## 2022-01-28 VITALS
HEIGHT: 69 IN | WEIGHT: 211 LBS | SYSTOLIC BLOOD PRESSURE: 128 MMHG | TEMPERATURE: 98 F | DIASTOLIC BLOOD PRESSURE: 82 MMHG | BODY MASS INDEX: 31.25 KG/M2 | HEART RATE: 68 BPM

## 2022-01-28 DIAGNOSIS — Z23 NEED FOR 23-POLYVALENT PNEUMOCOCCAL POLYSACCHARIDE VACCINE: ICD-10-CM

## 2022-01-28 DIAGNOSIS — R73.09 ELEVATED GLUCOSE: ICD-10-CM

## 2022-01-28 DIAGNOSIS — Z13.220 LIPID SCREENING: ICD-10-CM

## 2022-01-28 DIAGNOSIS — R09.81 NASAL CONGESTION: Primary | ICD-10-CM

## 2022-01-28 PROCEDURE — G8484 FLU IMMUNIZE NO ADMIN: HCPCS | Performed by: FAMILY MEDICINE

## 2022-01-28 PROCEDURE — 4040F PNEUMOC VAC/ADMIN/RCVD: CPT | Performed by: FAMILY MEDICINE

## 2022-01-28 PROCEDURE — 1036F TOBACCO NON-USER: CPT | Performed by: FAMILY MEDICINE

## 2022-01-28 PROCEDURE — 99214 OFFICE O/P EST MOD 30 MIN: CPT | Performed by: FAMILY MEDICINE

## 2022-01-28 PROCEDURE — 3017F COLORECTAL CA SCREEN DOC REV: CPT | Performed by: FAMILY MEDICINE

## 2022-01-28 PROCEDURE — 1123F ACP DISCUSS/DSCN MKR DOCD: CPT | Performed by: FAMILY MEDICINE

## 2022-01-28 PROCEDURE — G0009 ADMIN PNEUMOCOCCAL VACCINE: HCPCS | Performed by: FAMILY MEDICINE

## 2022-01-28 PROCEDURE — 90732 PPSV23 VACC 2 YRS+ SUBQ/IM: CPT | Performed by: FAMILY MEDICINE

## 2022-01-28 PROCEDURE — G8427 DOCREV CUR MEDS BY ELIG CLIN: HCPCS | Performed by: FAMILY MEDICINE

## 2022-01-28 PROCEDURE — G8417 CALC BMI ABV UP PARAM F/U: HCPCS | Performed by: FAMILY MEDICINE

## 2022-01-28 RX ORDER — MONTELUKAST SODIUM 10 MG/1
10 TABLET ORAL DAILY
Qty: 30 TABLET | Refills: 0 | Status: SHIPPED | OUTPATIENT
Start: 2022-01-28 | End: 2022-10-28

## 2022-01-28 RX ORDER — TAMSULOSIN HYDROCHLORIDE 0.4 MG/1
0.4 CAPSULE ORAL DAILY
COMMUNITY

## 2022-01-28 SDOH — ECONOMIC STABILITY: FOOD INSECURITY: WITHIN THE PAST 12 MONTHS, THE FOOD YOU BOUGHT JUST DIDN'T LAST AND YOU DIDN'T HAVE MONEY TO GET MORE.: NEVER TRUE

## 2022-01-28 SDOH — ECONOMIC STABILITY: FOOD INSECURITY: WITHIN THE PAST 12 MONTHS, YOU WORRIED THAT YOUR FOOD WOULD RUN OUT BEFORE YOU GOT MONEY TO BUY MORE.: NEVER TRUE

## 2022-01-28 ASSESSMENT — ENCOUNTER SYMPTOMS
ABDOMINAL DISTENTION: 0
CHEST TIGHTNESS: 0
NAUSEA: 0
VOMITING: 0
DIARRHEA: 0
CONSTIPATION: 0
SHORTNESS OF BREATH: 0
BLOOD IN STOOL: 0
ABDOMINAL PAIN: 0

## 2022-01-28 ASSESSMENT — SOCIAL DETERMINANTS OF HEALTH (SDOH): HOW HARD IS IT FOR YOU TO PAY FOR THE VERY BASICS LIKE FOOD, HOUSING, MEDICAL CARE, AND HEATING?: NOT HARD AT ALL

## 2022-01-28 NOTE — PROGRESS NOTES
Subjective:      Patient ID: Lupillo Bhardwaj is a 80 y.o. male. Chief Complaint   Patient presents with    Check-Up        Patient presents with:  Check-Up    He is well   Since being on the flomax the urine is better  He has some chronic sinus congestion  Not new  And wanted to try med for same  He did see ent last year    YOB: 1950    Date of Visit:  1/28/2022     -- Penicillins -- Hives    Current Outpatient Medications:  tamsulosin (FLOMAX) 0.4 MG capsule, Take 0.4 mg by mouth daily, Disp: , Rfl:   TRULANCE 3 MG TABS, Take 3 mg by mouth daily, Disp: , Rfl:   Magnesium 100 MG CAPS, Take 400 mg by mouth daily , Disp: , Rfl:   Turmeric 500 MG CAPS, Take by mouth daily , Disp: , Rfl:   vitamin C (ASCORBIC ACID) 500 MG tablet, Take 1,000 mg by mouth daily, Disp: , Rfl:   loratadine (CLARITIN) 10 MG tablet, Take 10 mg by mouth daily, Disp: , Rfl:    Multiple Vitamins-Minerals (THERAPEUTIC MULTIVITAMIN-MINERALS) tablet, Take 1 tablet by mouth daily, Disp: , Rfl:   glucosamine-chondroitin (GLUCOSAMINE CHONDR COMPLEX) 500-400 MG CAPS, Take by mouth daily , Disp: , Rfl:   magnesium citrate solution, Take 296 mLs by mouth once for 1 dose, Disp: 296 mL, Rfl: 0  fluticasone (VERAMYST) 27.5 MCG/SPRAY nasal spray, 2 sprays by Each Nostril route daily (Patient not taking: Reported on 1/28/2022), Disp: , Rfl:     No current facility-administered medications for this visit.      --------------------------               01/28/22                     1317        --------------------------   BP:          128/82        Site:    Left Upper Arm    Position:    Sitting        Cuff Size:  Large Adult      Pulse:         68          Temp:   98 °F (36.7 °C)    TempSrc:    Temporal       Weight: 211 lb (95.7 kg)   Height: 5' 9\" (1.753 m)   --------------------------  Body mass index is 31.16 kg/m².      Wt Readings from Last 3 Encounters:  01/28/22 : 211 lb (95.7 kg)  05/11/21 : 206 lb (93.4 kg)  04/30/21 : 209 lb (94.8 kg)    BP Readings from Last 3 Encounters:  01/28/22 : 128/82  05/11/21 : 109/74  04/30/21 : 122/78        Review of Systems   Constitutional: Negative for appetite change, chills, fever and unexpected weight change. Respiratory: Negative for chest tightness and shortness of breath. Cardiovascular: Negative for chest pain, palpitations and leg swelling. Gastrointestinal: Negative for abdominal distention, abdominal pain, blood in stool, constipation, diarrhea, nausea and vomiting. Genitourinary: Negative for difficulty urinating, dysuria and hematuria. Neurological: Negative for headaches. He gets sinus headaches        Objective:   Physical Exam  Constitutional:       General: He is not in acute distress. Appearance: Normal appearance. He is well-developed. He is not diaphoretic. HENT:      Head: Normocephalic and atraumatic. Right Ear: Tympanic membrane and ear canal normal.      Left Ear: Tympanic membrane and ear canal normal.      Nose: Congestion present. Right Turbinates: Swollen. Left Turbinates: Swollen. Comments: No nasal lesion     Mouth/Throat:      Lips: Pink. Mouth: Mucous membranes are moist. No oral lesions. Pharynx: Oropharynx is clear. Cardiovascular:      Rate and Rhythm: Normal rate and regular rhythm. Heart sounds: Normal heart sounds. No murmur heard. No friction rub. No gallop. Comments:     Pulmonary:      Effort: Pulmonary effort is normal. No tachypnea, accessory muscle usage or respiratory distress. Breath sounds: Normal breath sounds. No decreased breath sounds, wheezing, rhonchi or rales. Chest:   Breasts:      Right: No supraclavicular adenopathy. Left: No supraclavicular adenopathy. Abdominal:      General: Bowel sounds are normal. There is no distension or abdominal bruit. Palpations: Abdomen is soft. There is no mass. Tenderness:  There is no abdominal tenderness. There is no guarding. Musculoskeletal:      Cervical back: Neck supple. Lymphadenopathy:      Head:      Right side of head: No submental or submandibular adenopathy. Left side of head: No submental or submandibular adenopathy. Cervical: No cervical adenopathy. Upper Body:      Right upper body: No supraclavicular adenopathy. Left upper body: No supraclavicular adenopathy. Skin:     General: Skin is warm and dry. Coloration: Skin is not pale. Nails: There is no clubbing. Neurological:      Mental Status: He is alert. Assessment:        Diagnosis Orders   1. Nasal congestion     2. Elevated glucose  Hemoglobin A1C    Glucose   3.  Lipid screening         He is to have pneumonia vaccine  He has not had pb with in past  I did discuss with him about getting izzy check   Discussed getting shingle vaccine as well     Since last visit has had visit with surgeon for skin tag removal on 5/6/21  ent visit on 5/11/21  Colon done on 7/13/21 normal dr Libertad Rodriguez and check in 5 years      Plan:      Try the medicine for the nasal congestion  Call for concern or not getting better        Familia Arthur MD

## 2022-01-28 NOTE — PATIENT INSTRUCTIONS
Try the medicine for the nasal congestion  Call for concern or not getting better  Consider the shingle vaccine

## 2022-02-09 DIAGNOSIS — R73.09 ELEVATED GLUCOSE: ICD-10-CM

## 2022-02-09 LAB
ESTIMATED AVERAGE GLUCOSE: 99.7 MG/DL
GLUCOSE BLD-MCNC: 83 MG/DL (ref 70–99)
HBA1C MFR BLD: 5.1 %

## 2022-03-03 ENCOUNTER — OFFICE VISIT (OUTPATIENT)
Dept: FAMILY MEDICINE CLINIC | Age: 72
End: 2022-03-03
Payer: MEDICARE

## 2022-03-03 VITALS
SYSTOLIC BLOOD PRESSURE: 138 MMHG | DIASTOLIC BLOOD PRESSURE: 80 MMHG | TEMPERATURE: 97.7 F | HEIGHT: 69 IN | WEIGHT: 209 LBS | BODY MASS INDEX: 30.96 KG/M2

## 2022-03-03 DIAGNOSIS — F43.21 GRIEF: ICD-10-CM

## 2022-03-03 DIAGNOSIS — R09.81 SINUS CONGESTION: Primary | ICD-10-CM

## 2022-03-03 PROCEDURE — G8417 CALC BMI ABV UP PARAM F/U: HCPCS | Performed by: FAMILY MEDICINE

## 2022-03-03 PROCEDURE — 1123F ACP DISCUSS/DSCN MKR DOCD: CPT | Performed by: FAMILY MEDICINE

## 2022-03-03 PROCEDURE — G8427 DOCREV CUR MEDS BY ELIG CLIN: HCPCS | Performed by: FAMILY MEDICINE

## 2022-03-03 PROCEDURE — G8484 FLU IMMUNIZE NO ADMIN: HCPCS | Performed by: FAMILY MEDICINE

## 2022-03-03 PROCEDURE — 3017F COLORECTAL CA SCREEN DOC REV: CPT | Performed by: FAMILY MEDICINE

## 2022-03-03 PROCEDURE — 1036F TOBACCO NON-USER: CPT | Performed by: FAMILY MEDICINE

## 2022-03-03 PROCEDURE — 99213 OFFICE O/P EST LOW 20 MIN: CPT | Performed by: FAMILY MEDICINE

## 2022-03-03 PROCEDURE — 4040F PNEUMOC VAC/ADMIN/RCVD: CPT | Performed by: FAMILY MEDICINE

## 2022-03-03 RX ORDER — SULFAMETHOXAZOLE AND TRIMETHOPRIM 800; 160 MG/1; MG/1
1 TABLET ORAL 2 TIMES DAILY
Qty: 20 TABLET | Refills: 0 | Status: SHIPPED | OUTPATIENT
Start: 2022-03-03 | End: 2022-03-10

## 2022-03-03 RX ORDER — TRAZODONE HYDROCHLORIDE 50 MG/1
50 TABLET ORAL NIGHTLY
Qty: 30 TABLET | Refills: 0 | Status: SHIPPED
Start: 2022-03-03 | End: 2022-10-28

## 2022-03-03 ASSESSMENT — PATIENT HEALTH QUESTIONNAIRE - PHQ9
SUM OF ALL RESPONSES TO PHQ QUESTIONS 1-9: 12
10. IF YOU CHECKED OFF ANY PROBLEMS, HOW DIFFICULT HAVE THESE PROBLEMS MADE IT FOR YOU TO DO YOUR WORK, TAKE CARE OF THINGS AT HOME, OR GET ALONG WITH OTHER PEOPLE: 1
8. MOVING OR SPEAKING SO SLOWLY THAT OTHER PEOPLE COULD HAVE NOTICED. OR THE OPPOSITE, BEING SO FIGETY OR RESTLESS THAT YOU HAVE BEEN MOVING AROUND A LOT MORE THAN USUAL: 0
SUM OF ALL RESPONSES TO PHQ QUESTIONS 1-9: 12
9. THOUGHTS THAT YOU WOULD BE BETTER OFF DEAD, OR OF HURTING YOURSELF: 0
5. POOR APPETITE OR OVEREATING: 1
3. TROUBLE FALLING OR STAYING ASLEEP: 3
4. FEELING TIRED OR HAVING LITTLE ENERGY: 2
SUM OF ALL RESPONSES TO PHQ QUESTIONS 1-9: 12
6. FEELING BAD ABOUT YOURSELF - OR THAT YOU ARE A FAILURE OR HAVE LET YOURSELF OR YOUR FAMILY DOWN: 0
2. FEELING DOWN, DEPRESSED OR HOPELESS: 3
1. LITTLE INTEREST OR PLEASURE IN DOING THINGS: 3
SUM OF ALL RESPONSES TO PHQ9 QUESTIONS 1 & 2: 6
SUM OF ALL RESPONSES TO PHQ QUESTIONS 1-9: 12
7. TROUBLE CONCENTRATING ON THINGS, SUCH AS READING THE NEWSPAPER OR WATCHING TELEVISION: 0

## 2022-03-03 NOTE — PROGRESS NOTES
Subjective:      Patient ID: Wilian Cheatham is a 71 y.o. male.     Chief Complaint   Patient presents with    Depression     since wife passing    Congestion     sinus drainage all the time        Patient presents with:  Depression: since wife passing  Congestion: sinus drainage all the time    For few months lot of sinus d/c and pnd and some sore throat  No fever  No sinus pain    He does feel little down and blue  He is not wanting to hurt self  He is having some pb with sleep as well   Patient indicates the sleep is really the main problem  occ crying      YOB: 1950    Date of Visit:  3/3/2022     -- Penicillins -- Hives    Current Outpatient Medications:  tamsulosin (FLOMAX) 0.4 MG capsule, Take 0.4 mg by mouth daily, Disp: , Rfl:   montelukast (SINGULAIR) 10 MG tablet, Take 1 tablet by mouth daily, Disp: 30 tablet, Rfl: 0  TRULANCE 3 MG TABS, Take 3 mg by mouth daily, Disp: , Rfl:   Magnesium 100 MG CAPS, Take 400 mg by mouth daily , Disp: , Rfl:   Turmeric 500 MG CAPS, Take by mouth daily , Disp: , Rfl:   vitamin C (ASCORBIC ACID) 500 MG tablet, Take 1,000 mg by mouth daily, Disp: , Rfl:   loratadine (CLARITIN) 10 MG tablet, Take 10 mg by mouth daily, Disp: , Rfl:    fluticasone (VERAMYST) 27.5 MCG/SPRAY nasal spray, 2 sprays by Each Nostril route daily , Disp: , Rfl:   Multiple Vitamins-Minerals (THERAPEUTIC MULTIVITAMIN-MINERALS) tablet, Take 1 tablet by mouth daily, Disp: , Rfl:   glucosamine-chondroitin (GLUCOSAMINE CHONDR COMPLEX) 500-400 MG CAPS, Take by mouth daily , Disp: , Rfl:   magnesium citrate solution, Take 296 mLs by mouth once for 1 dose, Disp: 296 mL, Rfl: 0    No current facility-administered medications for this visit.      ---------------------------               03/03/22                      0941         ---------------------------   BP:          138/80         Site:    Left Upper Arm     Position:     Sitting         Cuff Size:   Large Adult Temp:   97.7 °F (36.5 °C)   TempSrc:    Temporal        Weight: 209 lb (94.8 kg)    Height:  5' 9\" (1.753 m)   ---------------------------  Body mass index is 30.86 kg/m². Wt Readings from Last 3 Encounters:  03/03/22 : 209 lb (94.8 kg)  01/28/22 : 211 lb (95.7 kg)  05/11/21 : 206 lb (93.4 kg)    BP Readings from Last 3 Encounters:  03/03/22 : 138/80  01/28/22 : 128/82  05/11/21 : 109/74            Review of Systems    Objective:   Physical Exam  Constitutional:       General: He is not in acute distress. Appearance: Normal appearance. He is well-developed. He is not ill-appearing or diaphoretic. HENT:      Head: Normocephalic and atraumatic. Right Ear: Tympanic membrane and ear canal normal.      Left Ear: Tympanic membrane and ear canal normal.      Nose: Congestion present. Mouth/Throat:      Mouth: No oral lesions. Pharynx: Uvula midline. Neck:      Thyroid: No thyroid mass or thyromegaly. Cardiovascular:      Heart sounds: No murmur heard. Pulmonary:      Effort: Pulmonary effort is normal. No tachypnea, accessory muscle usage or respiratory distress. Breath sounds: Normal breath sounds. No decreased breath sounds, wheezing, rhonchi or rales. Chest:   Breasts:      Right: No supraclavicular adenopathy. Left: No supraclavicular adenopathy. Musculoskeletal:      Cervical back: Neck supple. Lymphadenopathy:      Head:      Right side of head: No submental or submandibular adenopathy. Left side of head: No submental or submandibular adenopathy. Cervical: No cervical adenopathy. Upper Body:      Right upper body: No supraclavicular adenopathy. Left upper body: No supraclavicular adenopathy. Skin:     General: Skin is warm and dry. Coloration: Skin is not pale. Neurological:      Mental Status: He is alert. Assessment:       Diagnosis Orders   1. Sinus congestion     2.  Grief       Orders Placed This Encounter Medications    traZODone (DESYREL) 50 MG tablet     Sig: Take 1 tablet by mouth nightly     Dispense:  30 tablet     Refill:  0    sulfamethoxazole-trimethoprim (BACTRIM DS;SEPTRA DS) 800-160 MG per tablet     Sig: Take 1 tablet by mouth 2 times daily for 10 days     Dispense:  20 tablet     Refill:  0           Plan:      Do call me in about a week with update and let me know how you are doing        Fredy Galindo MD

## 2022-03-10 ENCOUNTER — TELEPHONE (OUTPATIENT)
Dept: FAMILY MEDICINE CLINIC | Age: 72
End: 2022-03-10

## 2022-03-10 RX ORDER — SULFAMETHOXAZOLE AND TRIMETHOPRIM 800; 160 MG/1; MG/1
1 TABLET ORAL 2 TIMES DAILY
Qty: 4 TABLET | Refills: 0 | Status: SHIPPED | OUTPATIENT
Start: 2022-03-10 | End: 2022-10-28

## 2022-03-10 NOTE — TELEPHONE ENCOUNTER
Called. He is doing better.  Sleep is better  The sinus sx have almost gone but still present  We discussed a few more days of the medicine to use   Call if any problem  He will finish the 10 days course  Add additional 2 days of the med    Orders Placed This Encounter   Medications    sulfamethoxazole-trimethoprim (BACTRIM DS;SEPTRA DS) 800-160 MG per tablet     Sig: Take 1 tablet by mouth 2 times daily     Dispense:  4 tablet     Refill:  0     additional medication

## 2022-03-10 NOTE — TELEPHONE ENCOUNTER
----- Message from Christina Arrington sent at 3/10/2022 11:01 AM EST -----  Subject: Message to Provider    QUESTIONS  Information for Provider? Pt called to f/u with Dr. Jacquie Hodge on the recent   medication change. Please advise.   ---------------------------------------------------------------------------  --------------  CALL BACK INFO  What is the best way for the office to contact you? OK to leave message on   voicemail  Preferred Call Back Phone Number? [phone]  ---------------------------------------------------------------------------  --------------  SCRIPT ANSWERS  Relationship to Patient?  Self

## 2022-05-10 ENCOUNTER — HOSPITAL ENCOUNTER (OUTPATIENT)
Dept: GENERAL RADIOLOGY | Age: 72
Discharge: HOME | End: 2022-05-10
Payer: MEDICARE

## 2022-05-10 ENCOUNTER — HOSPITAL ENCOUNTER (OUTPATIENT)
Age: 72
Discharge: HOME | End: 2022-05-10
Payer: MEDICARE

## 2022-05-10 ENCOUNTER — OFFICE VISIT (OUTPATIENT)
Dept: FAMILY MEDICINE CLINIC | Age: 72
End: 2022-05-10
Payer: MEDICARE

## 2022-05-10 VITALS
DIASTOLIC BLOOD PRESSURE: 82 MMHG | HEIGHT: 69 IN | TEMPERATURE: 98.7 F | BODY MASS INDEX: 31.25 KG/M2 | WEIGHT: 211 LBS | SYSTOLIC BLOOD PRESSURE: 120 MMHG

## 2022-05-10 DIAGNOSIS — M25.552 HIP PAIN, ACUTE, LEFT: ICD-10-CM

## 2022-05-10 DIAGNOSIS — M25.552 HIP PAIN, ACUTE, LEFT: Primary | ICD-10-CM

## 2022-05-10 DIAGNOSIS — R09.81 NASAL CONGESTION: ICD-10-CM

## 2022-05-10 PROCEDURE — 99213 OFFICE O/P EST LOW 20 MIN: CPT | Performed by: FAMILY MEDICINE

## 2022-05-10 PROCEDURE — 1036F TOBACCO NON-USER: CPT | Performed by: FAMILY MEDICINE

## 2022-05-10 PROCEDURE — 3017F COLORECTAL CA SCREEN DOC REV: CPT | Performed by: FAMILY MEDICINE

## 2022-05-10 PROCEDURE — G8417 CALC BMI ABV UP PARAM F/U: HCPCS | Performed by: FAMILY MEDICINE

## 2022-05-10 PROCEDURE — G8427 DOCREV CUR MEDS BY ELIG CLIN: HCPCS | Performed by: FAMILY MEDICINE

## 2022-05-10 PROCEDURE — 1123F ACP DISCUSS/DSCN MKR DOCD: CPT | Performed by: FAMILY MEDICINE

## 2022-05-10 PROCEDURE — 3288F FALL RISK ASSESSMENT DOCD: CPT | Performed by: FAMILY MEDICINE

## 2022-05-10 PROCEDURE — 73502 X-RAY EXAM HIP UNI 2-3 VIEWS: CPT

## 2022-05-10 PROCEDURE — 4040F PNEUMOC VAC/ADMIN/RCVD: CPT | Performed by: FAMILY MEDICINE

## 2022-05-10 ASSESSMENT — PATIENT HEALTH QUESTIONNAIRE - PHQ9
5. POOR APPETITE OR OVEREATING: 0
3. TROUBLE FALLING OR STAYING ASLEEP: 0
2. FEELING DOWN, DEPRESSED OR HOPELESS: 0
SUM OF ALL RESPONSES TO PHQ QUESTIONS 1-9: 0
8. MOVING OR SPEAKING SO SLOWLY THAT OTHER PEOPLE COULD HAVE NOTICED. OR THE OPPOSITE, BEING SO FIGETY OR RESTLESS THAT YOU HAVE BEEN MOVING AROUND A LOT MORE THAN USUAL: 0
4. FEELING TIRED OR HAVING LITTLE ENERGY: 0
1. LITTLE INTEREST OR PLEASURE IN DOING THINGS: 0
SUM OF ALL RESPONSES TO PHQ9 QUESTIONS 1 & 2: 0
9. THOUGHTS THAT YOU WOULD BE BETTER OFF DEAD, OR OF HURTING YOURSELF: 0
7. TROUBLE CONCENTRATING ON THINGS, SUCH AS READING THE NEWSPAPER OR WATCHING TELEVISION: 0
10. IF YOU CHECKED OFF ANY PROBLEMS, HOW DIFFICULT HAVE THESE PROBLEMS MADE IT FOR YOU TO DO YOUR WORK, TAKE CARE OF THINGS AT HOME, OR GET ALONG WITH OTHER PEOPLE: 0
SUM OF ALL RESPONSES TO PHQ QUESTIONS 1-9: 0
SUM OF ALL RESPONSES TO PHQ QUESTIONS 1-9: 0
6. FEELING BAD ABOUT YOURSELF - OR THAT YOU ARE A FAILURE OR HAVE LET YOURSELF OR YOUR FAMILY DOWN: 0
SUM OF ALL RESPONSES TO PHQ QUESTIONS 1-9: 0

## 2022-05-10 NOTE — PROGRESS NOTES
Subjective:      Patient ID: Maggie Desai is a 71 y.o. male.     Chief Complaint   Patient presents with    Hip Pain     left hip pain x 1 month        Patient presents with:  Hip Pain: left hip pain x 1 month    Here for the above  Hurts to walk  Hurts to lay on the left  No injury  Uses asa no help  Back is ok  No fever  No rash     Nasal drainage and some pnd typically year long and not new no pain no fever    YOB: 1950    Date of Visit:  5/10/2022     -- Penicillins -- Hives    Current Outpatient Medications:  sulfamethoxazole-trimethoprim (BACTRIM DS;SEPTRA DS) 800-160 MG per tablet, Take 1 tablet by mouth 2 times daily, Disp: 4 tablet, Rfl: 0  traZODone (DESYREL) 50 MG tablet, Take 1 tablet by mouth nightly, Disp: 30 tablet, Rfl: 0  tamsulosin (FLOMAX) 0.4 MG capsule, Take 0.4 mg by mouth daily, Disp: , Rfl:   montelukast (SINGULAIR) 10 MG tablet, Take 1 tablet by mouth daily, Disp: 30 tablet, Rfl: 0  TRULANCE 3 MG TABS, Take 3 mg by mouth daily, Disp: , Rfl:   Magnesium 100 MG CAPS, Take 400 mg by mouth daily , Disp: , Rfl:   Turmeric 500 MG CAPS, Take by mouth daily , Disp: , Rfl:   vitamin C (ASCORBIC ACID) 500 MG tablet, Take 1,000 mg by mouth daily, Disp: , Rfl:   loratadine (CLARITIN) 10 MG tablet, Take 10 mg by mouth daily, Disp: , Rfl:    fluticasone (VERAMYST) 27.5 MCG/SPRAY nasal spray, 2 sprays by Each Nostril route daily , Disp: , Rfl:   Multiple Vitamins-Minerals (THERAPEUTIC MULTIVITAMIN-MINERALS) tablet, Take 1 tablet by mouth daily, Disp: , Rfl:   glucosamine-chondroitin (GLUCOSAMINE CHONDR COMPLEX) 500-400 MG CAPS, Take by mouth daily , Disp: , Rfl:   magnesium citrate solution, Take 296 mLs by mouth once for 1 dose, Disp: 296 mL, Rfl: 0    No current facility-administered medications for this visit.      ---------------------------               05/10/22                      1418         ---------------------------   BP:          120/82         Site:    Left Upper Arm     Position:     Sitting        Cuff Size:   Large Adult      Temp:   98.7 °F (37.1 °C)   TempSrc:    Temporal        Weight: 211 lb (95.7 kg)    Height:  5' 9\" (1.753 m)   ---------------------------  Body mass index is 31.16 kg/m². Wt Readings from Last 3 Encounters:  05/10/22 : 211 lb (95.7 kg)  03/03/22 : 209 lb (94.8 kg)  01/28/22 : 211 lb (95.7 kg)    BP Readings from Last 3 Encounters:  05/10/22 : 120/82  03/03/22 : 138/80  01/28/22 : 128/82          Review of Systems    Objective:   Physical Exam  Constitutional:       General: He is not in acute distress. Appearance: Normal appearance. He is not ill-appearing. HENT:      Head: Normocephalic. Nose: Congestion present. Mouth/Throat:      Lips: Pink. Mouth: Mucous membranes are moist.      Pharynx: Oropharynx is clear. Musculoskeletal:      Comments: He is tender over the left trochanter bursal area  No marks no rash  He has no pain over the si joint  rom of the leg on left is fine and no pain   Groin no mass no pain to palpate  No pain over the L spine to percuss    Neurological:      Mental Status: He is alert. Assessment:        Diagnosis Orders   1.  Hip pain, acute, left  XR HIP LEFT (1 VIEW)   2. Nasal congestion       Orders Placed This Encounter   Medications    diclofenac (VOLTAREN) 50 MG EC tablet     Sig: Take 1 tablet by mouth 2 times daily (with meals)     Dispense:  24 tablet     Refill:  0       Possible bursitis   He is using flonase and antihistamine with little improvement      Plan:      Use the medicine for the hip   Call if not better  See dr Randi Rai for the chronic drainage         Kerwin Pickard MD

## 2022-06-09 DIAGNOSIS — H91.90 HEARING LOSS, UNSPECIFIED HEARING LOSS TYPE, UNSPECIFIED LATERALITY: Primary | ICD-10-CM

## 2022-06-16 ENCOUNTER — PROCEDURE VISIT (OUTPATIENT)
Dept: AUDIOLOGY | Age: 72
End: 2022-06-16
Payer: MEDICARE

## 2022-06-16 ENCOUNTER — OFFICE VISIT (OUTPATIENT)
Dept: ENT CLINIC | Age: 72
End: 2022-06-16
Payer: MEDICARE

## 2022-06-16 VITALS
HEART RATE: 69 BPM | BODY MASS INDEX: 30.96 KG/M2 | SYSTOLIC BLOOD PRESSURE: 131 MMHG | HEIGHT: 69 IN | WEIGHT: 209 LBS | RESPIRATION RATE: 16 BRPM | DIASTOLIC BLOOD PRESSURE: 79 MMHG

## 2022-06-16 DIAGNOSIS — H93.13 TINNITUS OF BOTH EARS: ICD-10-CM

## 2022-06-16 DIAGNOSIS — J34.89 NASAL OBSTRUCTION: ICD-10-CM

## 2022-06-16 DIAGNOSIS — H90.3 SENSORINEURAL HEARING LOSS (SNHL) OF BOTH EARS: Primary | ICD-10-CM

## 2022-06-16 DIAGNOSIS — J31.0 CHRONIC RHINITIS: ICD-10-CM

## 2022-06-16 DIAGNOSIS — R09.82 PND (POST-NASAL DRIP): ICD-10-CM

## 2022-06-16 DIAGNOSIS — H90.3 SENSORINEURAL HEARING LOSS (SNHL) OF BOTH EARS: ICD-10-CM

## 2022-06-16 DIAGNOSIS — R09.81 NASAL CONGESTION: ICD-10-CM

## 2022-06-16 DIAGNOSIS — R42 DIZZINESS: ICD-10-CM

## 2022-06-16 DIAGNOSIS — R42 VERTIGO: Primary | ICD-10-CM

## 2022-06-16 PROCEDURE — 1036F TOBACCO NON-USER: CPT | Performed by: STUDENT IN AN ORGANIZED HEALTH CARE EDUCATION/TRAINING PROGRAM

## 2022-06-16 PROCEDURE — 92557 COMPREHENSIVE HEARING TEST: CPT | Performed by: AUDIOLOGIST

## 2022-06-16 PROCEDURE — 99213 OFFICE O/P EST LOW 20 MIN: CPT | Performed by: STUDENT IN AN ORGANIZED HEALTH CARE EDUCATION/TRAINING PROGRAM

## 2022-06-16 PROCEDURE — 92567 TYMPANOMETRY: CPT | Performed by: AUDIOLOGIST

## 2022-06-16 PROCEDURE — 3017F COLORECTAL CA SCREEN DOC REV: CPT | Performed by: STUDENT IN AN ORGANIZED HEALTH CARE EDUCATION/TRAINING PROGRAM

## 2022-06-16 PROCEDURE — 31231 NASAL ENDOSCOPY DX: CPT | Performed by: STUDENT IN AN ORGANIZED HEALTH CARE EDUCATION/TRAINING PROGRAM

## 2022-06-16 PROCEDURE — G8417 CALC BMI ABV UP PARAM F/U: HCPCS | Performed by: STUDENT IN AN ORGANIZED HEALTH CARE EDUCATION/TRAINING PROGRAM

## 2022-06-16 PROCEDURE — 1123F ACP DISCUSS/DSCN MKR DOCD: CPT | Performed by: STUDENT IN AN ORGANIZED HEALTH CARE EDUCATION/TRAINING PROGRAM

## 2022-06-16 PROCEDURE — G8427 DOCREV CUR MEDS BY ELIG CLIN: HCPCS | Performed by: STUDENT IN AN ORGANIZED HEALTH CARE EDUCATION/TRAINING PROGRAM

## 2022-06-16 RX ORDER — FLUTICASONE PROPIONATE 50 MCG
2 SPRAY, SUSPENSION (ML) NASAL 2 TIMES DAILY
Qty: 16 G | Refills: 5 | Status: SHIPPED | OUTPATIENT
Start: 2022-06-16 | End: 2022-10-28

## 2022-06-16 RX ORDER — AZELASTINE 1 MG/ML
2 SPRAY, METERED NASAL 2 TIMES DAILY
Qty: 30 ML | Refills: 3 | Status: SHIPPED | OUTPATIENT
Start: 2022-06-16 | End: 2022-09-15 | Stop reason: SDUPTHER

## 2022-06-16 NOTE — PROGRESS NOTES
Theo Mccormick   1950, 71 y.o. male   [id number]       Referring Provider: Yordy Mathews MD  Referral Type: In an order in [address] [place] [address]    Reason for Visit: Evaluation of suspected change in hearing, tinnitus, or balance. ADULT AUDIOLOGIC EVALUATION      Theo Mccormick is a 71 y.o. male seen today, 6/16/2022 , for a recheck audiologic evaluation. Patient was seen by Yordy Mathews MD following today's evaluation. AUDIOLOGIC AND OTHER PERTINENT MEDICAL HISTORY:      Theo Mccormick noted no significant change since last audiogram on 3/10/2021    Today he notes dizziness and tinnitus are persistent from his last evaluation. He notes a \"clogged\" sensation in the left ear as well. Previous history and results from audiologic evaluation on 3/10/2021:  Theo Mccormick noted tinnitus, dizziness, imbalance, history of occupational noise exposure and family history of hearing loss. Patient reports episodes of dizziness which he describes as a swaying sensation that is intermittent in nature. This began about a month ago and has become more frequent in the past few weeks. He also notes left sided tinnitus that is constant in nature. He has a history of working in construction. Patient also reports his father has a history of hearing loss. AU: Hearing WNL sloping to Severe SNHL, Excellent WRS, Type A tymps    Date: 6/16/2022     IMPRESSIONS:      AU: Hearing WNL sloping to Severe SNHL, Excellent WRS, Type A tymps    Test results consistent with bilateral Sensorineural hearing loss. Hearing loss significant enough to create hearing difficulty in most listening situations. Discussed hearing loss, tinnitus, hearing aids and dizziness with patient.  Patient to follow medical recommendations per Yordy Mathews MD .    ASSESSMENT AND FINDINGS:     Otoscopy revealed: Clear ear canals bilaterally    RIGHT EAR:  Hearing Sensitivity: Normal hearing sensitivity to  Severe   Sensorineural hearing loss  Speech Recognition Threshold: 20 dB HL  Word Recognition:Excellent (100%), based on NU-6 25-word list at 65 dBHL using recorded speech stimuli. Tympanometry: Normal peak pressure and compliance, Type A tympanogram, consistent with normal middle ear function. Acoustic Reflexes: Ipsilateral: Could not maintain seal. Contralateral: Could not maintain seal.    LEFT EAR:  Hearing Sensitivity: Normal hearing sensitivity to  Severe   Sensorineural hearing loss  Speech Recognition Threshold: 15 dB HL  Word Recognition:Excellent (100%), based on NU-6 25-word list at 65 dBHL using recorded speech stimuli. Tympanometry: Normal peak pressure and compliance, Type A tympanogram, consistent with normal middle ear function. Acoustic Reflexes: Ipsilateral: Could not maintain seal. Contralateral: Could not maintain seal.    Reliability: Good  Transducer: Inserts    See scanned audiogram dated 6/16/2022  for results. PATIENT EDUCATION:     The following items were discussed with the patient:   - Good Communication Strategies  - Hearing Loss and Hearing Aids  - Tinnitus Management Strategies    - Noise-Induced Hearing Loss and use of Hearing Protection Devices (HPDs)   - Fall Risk and Prevention   - Dizziness    Educational information was shared in the After Visit Summary. RECOMMENDATIONS:                                                                                                                                                                                                                                                          The following items are recommended based on patient report and results from today's appointment:   - Continue medical follow-up with Patrick Carpio MD.   - Retest hearing as medically indicated and/or sooner if a change in hearing is noted. - If desired, schedule a Hearing Aid Evaluation (HAE) appointment to discuss hearing aid options.   - Utilize \"Good Communication Strategies\" as discussed to assist in speech understanding with communication partners. - Maintain a sound enriched environment to assist in the management of tinnitus symptoms.  - Use hearing protection devices (HPDs), such as protective ear muffs and ear plugs, when exposed to dangerous sound levels. - If medically indicated, consider vestibular evaluation to further investigate symptoms of dizziness.        Murali Welsh  Audiologist    Chart CC'd to: Jerson Cm MD      Degree of   Hearing Sensitivity dB Range   Within Normal Limits (WNL) 0 - 20   Mild 20 - 40   Moderate 40 - 55   Moderately-Severe 55 - 70   Severe 70 - 90   Profound 90 +

## 2022-06-16 NOTE — PROGRESS NOTES
[address] [facility] (:  1950) is a 71 y.o. male, here for evaluation of the following chief complaint(s):  Dizziness and Ear Fullness (left ear)      ASSESSMENT/PLAN:  1. Vertigo  2. Tinnitus of both ears  3. Sensorineural hearing loss (SNHL) of both ears  4. Dizziness  5. Nasal obstruction  6. Nasal congestion  7. PND (post-nasal drip)      This is a very pleasant 71 y.o. male here today for evaluation of the the above-noted complaints. Discussed treatment of postnasal drip and mucus in his throat. Have asked him to trial fluticasone and azelastine. He can also add saline irrigations. I previously discussed the patient's audiogram with him and it shows evidence of bilateral sensorineural hearing loss that was likely exacerbated by his occupational noise exposure. Should he desire, then he would be a good candidate for hearing aids. Regarding his dizziness/vertigo, this is of unclear etiology. We discussed different possibilities including vestibular migraines and/or issues with his balance or even a vestibular neuronitis. His vertigo is stable for now. We will observe this. He previously responded well to vestibular therapy        SUBJECTIVE/OBJECTIVE:  CAROLS    Clarke Ahn is here today for evaluation of  Issues related to dizziness. He also has associated ringing in his ears and issues with balance. His dizziness feels like a swaying sensation and started about 1 month ago. He gets constant ringing in his ears. Of note, he has a history of occupational noise exposure. Clarke Ahn noted tinnitus, dizziness, imbalance, history of occupational noise exposure and family history of hearing loss. Patient reports episodes of dizziness which he describes as a swaying sensation that is intermittent in nature. This began about a month ago and has become more frequent in the past few weeks.  He also notes left sided tinnitus that is constant in nature. He has a history of working in construction. Patient also reports his father has a history of hearing loss.           Audiogram from 3/10/2021:    AU: Hearing WNL sloping to Severe SNHL, Excellent WRS, Type A tymps  Update 5/1/2021:    Patient presents today for follow-up. He recently completed vestibular therapy and his symptoms have since resolved. He is essentially back to his baseline. 6/16/2022:  -Constant sinus drainage for months  -Doing sinus irrigations  -Occasional use of fluticasone  -Constant sore throat  -Vertigo comes and goes  -difficulty with pressure changes in car  -Recently switched from [address] [place] [address] to [place]    REVIEW OF SYSTEMS  The following systems were reviewed and revealed the following in addition to any already discussed in the HPI:    PHYSICAL EXAM    GENERAL: No acute distress, alert and oriented, no hoarseness, strong voice  EYES: EOMI, Anti-icteric  HENT:   Head: Normocephalic and atraumatic. Face:  Symmetric, facial nerve intact, no sinus tenderness  Right Ear: Normal external ear, normal external auditory canal, intact tympanic membrane with normal mobility and aerated middle ear  Left Ear: Normal external ear, normal external auditory canal, intact tympanic membrane with normal mobility and aerated middle ear  Mouth/Oral Cavity:  normal lips, Uvula is midline, no mucosal lesions, no trismus, normal dentition, normal salivary quality/flow  Oropharynx/Larynx:  normal oropharynx, normal tonsils; patient did not tolerate mirror exam due to excessive gag reflex  Nose:Normal external nasal appearance. Anterior rhinoscopy shows  a deviated septum preventing view posteriorly. turbinates.   Normal mucosa     Nasal Endoscopy (CPT code 94532)       Due to the patients chronic sinus disease and/or history of sinonasal neoplasm for surveillance a nasal endoscopy with or without debridement will be performed to complete a significant physical examination of the patient which cannot be performed by anterior rhinoscopy alone (failure of complete examination of the paranasal sinuses). Failure to provide this procedure may lead to late detection of significant chronic benign disease, acute exacerbation, resolution or failure of early diagnosis of recurrent cancer. The procedure report is present in the body of the chart. Verbal consent was received. After topical anesthesia and decongestion had been obtained using aerosolized 1% lidocaine and oxymetazoline, a 45 degree rigid endoscope was placed into both nares with the patient in a sitting position. The following was observed:        Septum: intact and deviated   Other:   -The inferior and middle turbinates were examined. The middle meatus, and sphenoethmoid recess was examined bilaterally.    -Clear secretions in the bilateral sinonasal cavities. No evidence of underlying infection. Appears to be allergic changes. .   -There were no complications. Tolerated well without complication. I attest that I was present for and did the entire procedure myself. This note was generated completely or in part utilizing Dragon dictation speech recognition software. Occasionally, words are mistranscribed and despite editing, the text may contain inaccuracies due to incorrect word recognition. If further clarification is needed please contact the office at [phone]. An electronic signature was used to authenticate this note.     --Magdalena Walden MD

## 2022-06-16 NOTE — PATIENT INSTRUCTIONS
If you are interested in pursuing hearing aids, here are the next steps:    1) Contact your insurance and ask, Do I have a benefit for hearing aids?    If the answer is no hearing aids will be a 100% out of pocket expense   If the answer is yes, ask Can I use this benefit at [facility] ([facility])?  or Where can I use this benefit?  If [facility] is not in-network for hearing aids, your insurance should be able to provide the appropriate contact information for an in-network provider. 2) Call [facility] ([phone]) to schedule a Hearing Aid Evaluation    This appointment is when we will discuss hearing aid options and decide which device is most appropriate for you. Learning About Hearing Aids  What is a hearing aid? A hearing aid makes sounds louder. It can help some people with hearing problems to hear better. Hearing aids do not restore normal hearing. But they can make it easier to communicate by making sounds clearer. · Digital programmable hearing aids can adjust themselves to work best where you are at any time. You also have more choices in setting them up than with analog hearing aids. There are also different styles of hearing aids. · A behind-the-ear (BTE) hearing aid connects to a plastic ear mold that fits inside the outer ear. BTE hearing aids are used for all levels of hearing loss, especially very severe hearing loss. They may be better for children for safety and growth reasons. Poorly fitting BTE ear molds or a buildup of earwax may cause a whistling sound (feedback). · An in-the-ear (ITE) hearing aid fits in the outer part of the ear. It can be used by people with mild to severe hearing loss. ITE hearing aids can be used with other hearing devices, such as a telecoil that improves hearing during phone calls. ITE hearing aids can be damaged by earwax and fluid draining from the ear. Their small size may be hard for some people to handle.  They are not often used in children because the case must be replaced as the child grows. · An in-the-canal (ITC) hearing aid fits into the ear canal. ITC hearing aids are used by people with mild to moderate hearing loss. They are made to fit the shape and the size of your ear canal. They can be damaged by earwax and fluid draining from the ear. Their small size may be hard for some people to handle. They are not made for children. You have some options if you have a hearing problem and are thinking about getting hearing aids. You can go to your doctor or an audiologist. He or she will do a hearing test and help you decide which type and style of hearing aid may be best for you. What else should I know about hearing aids? Find out if your insurance covers hearing aids. They can be expensive. Different types of hearing aids come with different costs. Also find out about a warranty or return policy in case you are not happy with your hearing aids. Follow-up care is a key part of your treatment and safety. Be sure to make and go to all appointments, and call your doctor if you are having problems. It's also a good idea to know your test results and keep a list of the medicines you take. Where can you learn more? Go to https://NOW! Innovations.[employer]. org and sign in to your [employer] account. Enter W263 in the [facility] box to learn more about \"Learning About Hearing Aids. \"     If you do not have an account, please click on the \"Sign Up Now\" link. Current as of: October 21, 2018  Content Version: 12.1  © 6193-9804 Healthwise, Incorporated. Care instructions adapted under license by [facility] ([facility]). If you have questions about a medical condition or this instruction, always ask your healthcare professional. [place] [address] any warranty or liability for your use of this information.       Hearing Loss: Care Instructions  Your Care Instructions      Hearing loss is a sudden or slow decrease in how well This shows the words at the bottom of the screen. Most new TVs can do this. ? TTY (text telephone). This lets you type messages back and forth on the telephone instead of talking or listening. These devices are also called TDD. When messages are typed on the keyboard, they are sent over the phone line to a receiving TTY. The message is shown on a monitor. · Use pagers, fax machines, text, and email if it is hard for you to communicate by telephone. · Try to learn a listening technique called speech-reading. It is not lip-reading. You pay attention to people's gestures, expressions, posture, and tone of voice. These clues can help you understand what a person is saying. Face the person you are talking to, and have him or her face you. Make sure the lighting is good. You need to see the other person's face clearly. · Think about counseling if you need help to adjust to your hearing loss. When should you call for help? Watch closely for changes in your health, and be sure to contact your doctor if:    · You think your hearing is getting worse. · You have new symptoms, such as dizziness or nausea. Tinnitus: Overview and Management Strategies          Many people have some ringing sounds in their ears once in a while. You may hear a roar, a hiss, a tinkle, or a buzz. The sound usually lasts only a few minutes. If it goes on all the time, you may have tinnitus. Tinnitus is usually caused by long-term exposure to loud noise. This damages the nerves in the inner ear. It can occur with all types of hearing loss. It may be a symptom of almost any ear problem. Tinnitus may be caused by a buildup of earwax. Or, it may be caused by ear infections or certain medicines (especially antibiotics or large amounts of aspirin). You can also hear noises in your ears because of an injury to the ears, drinking too much alcohol or caffeine, or a medical condition.   Other conditions may also contribute to tinnitus, including: head and neck trauma, temporomandibular joint disorder (TMJ), sinus pressure and barometric trauma, traumatic brain injury, metabolic disorders, autoimmune disorders, stress, and high blood pressure. You may need tests to evaluate your hearing and to find causes of long-lasting tinnitus. Your doctor may suggest one or more treatments to help you cope with the tinnitus. You can also do things at home to help reduce symptoms. Causes of Tinnitus  We do not know the exact cause of tinnitus. One thing we do know is that you are not imagining it. If you have tinnitus, chances are the cause will remain a mystery. Conditions that might cause tinnitus include the following:    Hearing loss    Ménière's disease    Loud noise exposure    Migraines    Head injury    Drugs or medicines that are toxic to hearing    Anemia    High blood pressure    Stress    A lot of wax in the ear    Certain types of tumors    Having a lot of caffeine    Smoking cigarettes  You may find that your tinnitus is worse at night. This happens because it is quiet and you are not distracted. Feeling tired and stressed may make your tinnitus worse. Follow-up care is a key part of your treatment and safety. Be sure to make and go to all appointments, and call your doctor if you are having problems. It's also a good idea to know your test results and keep a list of the medicines you take. How can you care for yourself at home? · Limit or cut out alcohol, caffeine, and sodium. They can make your symptoms worse. · Do not smoke or use other tobacco products. Nicotine reduces blood flow to the ear and makes tinnitus worse. If you need help quitting, talk to your doctor about stop-smoking programs and medicines. These can increase your chances of quitting for good. · Talk to your doctor about whether to stop taking aspirin and similar products such as ibuprofen or naproxen. · Get exercise often.  It can help improve blood flow to the ear. Hearing Aids and Other Devices  A hearing aid may help your tinnitus if you have a hearing loss. An audiologist can help you find and use the best hearing aid for you. Tinnitus maskers look like hearing aids. They make a sound that masks, or covers up, the tinnitus. The masking sound distracts you from the ringing in your ears. You may be able to use a masker and a hearing aid at the same time. Sound machines can be useful at night or during quiet times. There are machines you can buy at the store. Or, you can find apps on your phone that make sounds, like the ocean or rainfall. Fish tanks, fans, quiet music, and indoor waterfalls can help, as well. Ways to manage/cope with tinnitus  Some tinnitus may last a long time. To manage your tinnitus, try to:  · Avoid noises that you think caused your tinnitus. If you can't avoid loud noises, wear earplugs or earmuffs. · Ignore the sound by paying attention to other things. Keeping your brain busy with other tasks or background noise can help your brain not focus on the tinnitus. · Try to not give the tinnitus an emotional reaction. Do your best to ignore the sound and not let it bother you. Relax using biofeedback, meditation, or yoga. Feeling stressed and being tired can make tinnitus worse. · Play music or white noise to help you sleep. Background noise may cover up the noise that you hear in your ears. You can buy a tabletop machine or a device that sits under your pillow to play soothing sounds, like ocean waves. · Smart phones have free apps, such as Whist, Relax Melodies, ReSound Relief, and White Noise Lite. These apps have different types of sounds/noise, some of which you can blend together to find sounds that are most soothing to you. · Hearing aid technology, especially when there is some hearing loss, may help reduce tinnitus symptoms by giving your brain better access to the sounds it is missing.   There are some hearing aids with built-in noise generator programs, which may help when amplification alone is not enough. Additional resources may be found through the American Tinnitus Association at www.rajiv.org    When should you call for help? Call 911 anytime you think you may need emergency care. For example, call if:    · You have symptoms of a stroke. These may include:  ? Sudden numbness, tingling, weakness, or loss of movement in your face, arm, or leg, especially on only one side of your body. ? Sudden vision changes. ? Sudden trouble speaking. ? Sudden confusion or trouble understanding simple statements. ? Sudden problems with walking or balance. ? A sudden, severe headache that is different from past headaches. Call your doctor now or seek immediate medical care if:    · You develop other symptoms. These may include hearing loss (or worse hearing loss), balance problems, dizziness, nausea, or vomiting. Watch closely for changes in your health, and be sure to contact your doctor if:    · Your tinnitus moves from both ears to one ear. · Your hearing loss gets worse within 1 day after an ear injury. · Your tinnitus or hearing loss does not get better within 1 week after an ear injury. · Your tinnitus bothers you enough that you want to take medicines to help you cope with it. If you notice changes in your tinnitus and/or your hearing, it is recommended that you have your hearing tested by your audiologist and to follow-up with your physician that manages your hearing loss (such as your ENT or Primary Care doctor). Good Communication Strategies    Communication can be challenging for anyone, but can be especially difficult for those with some degree of hearing loss. While we may not be able to control every factor that may lead to difficulty with communication, there are Good Communication Strategies that we can all use in our day-to-day lives.   Communication takes both parties working together for it to be successful. Tips as a Listener:   1. Control your environment. It is important to limit the amount of background noise in the room when possible. You should also consider having a good light source in the room to best see the other person. 2. Ask for clarification. Instead of saying \"What?\", you can use parts of what you heard to make a new question. For example, if you heard the word \"Thursday\" but not the rest of the week, you may ask \"What was that about Thursday? \" or \"What did you want to do Thursday? \". This shows the person talking that you are listening and will help them better explain what they are saying. 3. Be an advocate for yourself. If you are hearing but not understanding, tell the other person \"I can hear you, but I need you to slow down when you speak. \"  Or if someone is facing the other direction, say \"I cannot hear you when you are not looking at me when we talk. \"       Tips as a Talker:   - Sit or stand 3 to 6 feet away to maximize audibility         -- It is unrealistic to believe someone else will fully hear your message if you are speaking from across the room or in a different room in the house   - Stay at eye level to help with visual cues   - Make sure you have the persons attention before speaking   - Use facial expressions and gestures to accentuate your message   - Raise your voice slightly (do not scream)   - Speak slowly and distinctly   - Use short, simple sentences   - Rephrase your words if the person is having a hard time understanding you    - To avoid distortion, dont speak directly into a persons ear      Some additional items that may be helpful:   - Remain patient - this is important for both parties   - Write down items that still cannot be heard/understood. You may write with pen/paper or consider typing/texting on a cell phone or smart device. - If background noise is unavoidable, try to keep yourself in a good position in the room.   By sitting at a alcala on the side of the restaurant (preferably a corner), it will be easier to communicate than if you were sitting at a table in the middle with background noise surrounding you. Keep yourself positioned away from music speakers or heavy foot traffic. Noise-Induced Hearing Loss  What it is, and what you can do to prevent it      Exposure to loud sounds, in an occupational setting or recreational, can cause permanent hearing loss. Sound is measured in decibels (dB). Noise-induced hearing loss is the ONLY type of preventable hearing loss. Hearing loss related to noise exposure can occur at any age. There are small sensory cells, called inner and outer hair cells, within the inner ear (cochlea). These cells process the loudness (intensity) and pitch (frequency) of sound and send the signal to the brain via our auditory nerve (vestibulocochlear nerve, cranial nerve VIII). When these cells are damaged, they can result in permanent hearing loss and/or tinnitus. The hair cells responsible for high frequency sounds, like birds chirping, are most likely to be damaged due to loud sounds. The high frequency sounds are also very important for our clarity and understanding of speech. OCCUPATIONAL NOISE EXPOSURE RECREATIONAL NOISE EXPOSURE   Some jobs may have exposure to loud sounds in the workplace. These jobs may include but are not limited to:  [place]   Construction   Welding   Landscaping   Hairdressing/hairstyling   Musicians  [employer]    ... And more! Many activities outside of work may cause permanent hearing loss. These activities may include but are not limited to:    Lawnmowers, leaf blowers  [employer] (such as pigs squealing)   Chainsaws and other power tools  yeppt musical instruments and/or singing   Listening to music too loudly - at concerts, through stereo, through ear buds or headphones   Attending sporting events   Attending [place] shows or using fireworks at home  [employer] of firearms  [employer] . .. And more! REDUCE OR PROTECT YOUR EARS FROM NOISE EXPOSURE    To do your best to avoid noise-induced hearing loss, here are some tips:   Limit exposure to loud sounds. 85 dB (decibels) is safe for 8 hours. As sounds are louder, the length of time the sound is safe lessens. These numbers are cumulative across a 24-hour period. (NIOSH and CDC, 2002)  o 85 dB is safe for 8 hours  o 88 dB is safe for 4 hours  o 91 dB is safe for 2 hours  o 94 dB is safe for 1 hour  o 97 dB is safe for 30 minutes  o 100 dB is safe for 15 minutes  o 103 dB is safe for 7.5 minutes  o 106 dB is safe for 3.75 minutes  o 109 dB is safe for LESS THAN 2 minutes  o 112 dB is safe for LESS THAN 1 minute  o 115 dB is safe for ~ 30 seconds  o 130 dB can cause IMMEDIATE hearing loss   If you are unsure if a sound is too loud, consider checking the sound level with a \"sound level meter\". There are apps on smart devices that can measure the loudness of the sound. They are not as accurate as expensive equipment used by scientists, but it will give you a guesstimate of how loud the sound is, and if it may be damaging to your hearing.  If you cannot avoid loud sounds, here are ways to reduce your exposure:  o 1. Wear hearing protection  - Ear plugs and protective ear muffs can be used to reduce the intensity of the sound. The higher the NRR (noise reduction rating), the better reduction of the intensity of the sound   o 2. Turn the volume down  - When listening to music, turn the volume down, especially when wearing ear buds or headphones. A good rule of thumb is to not go beyond the middle setting on your device. If you can't hear someone talking to you from arm's length away, your music may be at a level that it can cause damage.   If someone else can hear your music from 3 feet away, it may also be at a level that it can cause damage. o 3. Walk away from the sound  - If you do not have the ability to wear hearing protection or turn down the volume of the sound, you should do your best to move away from the source of the sound. - Sound decreases in intensity as we move further from the source. The sound will decrease by 6 dB for every doubling of distance from the sound source. Exposure to these sounds may cause permanent damage to your hearing. If you suspect your hearing has changed, it is recommended that you have your hearing tested by your audiologist.    Dizziness: Care Instructions  Your Care Instructions  Dizziness is the feeling of unsteadiness or fuzziness in your head. It is different than having vertigo, which is a feeling that the room is spinning or that you are moving or falling. It is also different from lightheadedness, which is the feeling that you are about to faint. It can be hard to know what causes dizziness. Some people feel dizzy when they have migraine headaches. Sometimes bouts of flu can make you feel dizzy. Some medical conditions, such as heart problems or high blood pressure, can make you feel dizzy. Many medicines can cause dizziness, including medicines for high blood pressure, pain, or anxiety. If a medicine causes your symptoms, your doctor may recommend that you stop or change the medicine. If it is a problem with your heart, you may need medicine to help your heart work better. If there is no clear reason for your symptoms, your doctor may suggest watching and waiting for a while to see if the dizziness goes away on its own. Follow-up care is a key part of your treatment and safety. Be sure to make and go to all appointments, and call your doctor if you are having problems. It's also a good idea to know your test results and keep a list of the medicines you take. How can you care for yourself at home?     · If your doctor recommends or prescribes medicine, take it exactly as directed. Call your doctor if you think you are having a problem with your medicine. · Do not drive while you feel dizzy. · Try to prevent falls. Steps you can take include:  ? Using nonskid mats, adding grab bars near the tub, and using night-lights. ? Clearing your home so that walkways are free of anything you might trip on.  ? Letting family and friends know that you have been feeling dizzy. This will help them know how to help you. When should you call for help? Call 911 anytime you think you may need emergency care. For example, call if:    · You passed out (lost consciousness). · You have dizziness along with symptoms of a heart attack. These may include:  ? Chest pain or pressure, or a strange feeling in the chest.  ? Sweating. ? Shortness of breath. ? Nausea or vomiting. ? Pain, pressure, or a strange feeling in the back, neck, jaw, or upper belly or in one or both shoulders or arms. ? Lightheadedness or sudden weakness. ? A fast or irregular heartbeat. · You have symptoms of a stroke. These may include:  ? Sudden numbness, tingling, weakness, or loss of movement in your face, arm, or leg, especially on only one side of your body. ? Sudden vision changes. ? Sudden trouble speaking. ? Sudden confusion or trouble understanding simple statements. ? Sudden problems with walking or balance. ? A sudden, severe headache that is different from past headaches. Call your doctor now or seek immediate medical care if:    · You feel dizzy and have a fever, headache, or ringing in your ears. · You have new or increased nausea and vomiting. · Your dizziness does not go away or comes back. Watch closely for changes in your health, and be sure to contact your doctor if:    · You do not get better as expected. Where can you learn more? Go to https://chpita.[employer]. org and sign in to your [employer] account.  Enter O905 in the [employer] box to learn more about \"Dizziness: Care Instructions. \"     If you do not have an account, please click on the \"Sign Up Now\" link. Current as of: September 23, 2018  Content Version: 11.9  © 2006-2018 [employer]. Care instructions adapted under license by [facility] ([facility]). If you have questions about a medical condition or this instruction, always ask your healthcare professional. [address] any warranty or liability for your use of this information. Patient Education        Preventing Falls: Care Instructions  Your Care Instructions     Getting around your home safely can be a challenge if you have injuries or health problems that make it easy for you to fall. Loose rugs and furniture in walkways are among the dangers for many older people who have problems walking or who have poor eyesight. People who have conditions such as arthritis,osteoporosis, or dementia also have to be careful not to fall. You can make your home safer with a few simple measures. Follow-up care is a key part of your treatment and safety. Be sure to make and go to all appointments, and call your doctor if you are having problems. It's also a good idea to know your test results and keep alist of the medicines you take. How can you care for yourself at home? Taking care of yourself   Exercise regularly to improve your strength, muscle tone, and balance. Walk if you can. Swimming may be a good choice if you cannot walk easily.  Have your vision and hearing checked each year or any time you notice a change. If you have trouble seeing and hearing, you might not be able to avoid objects and could lose your balance.  Know the side effects of the medicines you take. Ask your doctor or pharmacist whether the medicines you take can affect your balance. Sleeping pills or sedatives can affect your balance.  Limit the amount of alcohol you drink. Alcohol can impair your balance and other senses.    Ask your doctor whether calluses or corns on your feet need to be removed. If you wear loose-fitting shoes because of calluses or corns, you can lose your balance and fall.  Talk to your doctor if you have numbness in your feet.  You may get dizzy if you do not drink enough water. To prevent dehydration, drink plenty of fluids. Choose water and other clear liquids. If you have kidney, heart, or liver disease and have to limit fluids, talk with your doctor before you increase the amount of fluids you drink. Preventing falls at home   Remove raised doorway thresholds, throw rugs, and clutter. Repair loose carpet or raised areas in the floor. [facility] furniture and electrical cords to keep them out of walking paths.  Use nonskid floor wax, and wipe up spills right away, especially on ceramic tile floors.  If you use a walker or cane, put rubber tips on it. If you use crutches, clean the bottoms of them regularly with an abrasive pad, such as steel wool.  Keep your house well lit, especially [place], and outside walkways. Use night-lights in areas such as hallways and bathrooms. Add extra light switches or use remote switches (such as switches that go on or off when you clap your hands) to make it easier to turn lights on if you have to get up during the night.  Install sturdy handrails on stairways.  Move items in your cabinets so that the things you use a lot are on the lower shelves (about waist level).  Keep a cordless phone and a flashlight with new batteries by your bed. If possible, put a phone in each of the main rooms of your house, or carry a cell phone in case you fall and cannot reach a phone. Or, you can wear a device around your neck or wrist. You push a button that sends a signal for help.  Wear low-heeled shoes that fit well and give your feet good support. Use footwear with nonskid soles. Check the heels and soles of your shoes for wear. Repair or replace worn heels or soles.    Do not wear socks without shoes on wood floors.  Walk on the grass when the sidewalks are slippery. If you live in an area that gets snow and ice in the winter, sprinkle salt on slippery steps and sidewalks. Or ask a family member or friend to do this for you. Preventing falls in the bath   Install grab bars and nonskid mats inside and outside your shower or tub and near the toilet and sinks.  Use shower chairs and bath benches.  Use a hand-held shower head that will allow you to sit while showering.  Get into a tub or shower by putting the weaker leg in first. Get out of a tub or shower with your strong side first.   Repair loose toilet seats and consider installing a raised toilet seat to make getting on and off the toilet easier.  Keep your bathroom door unlocked while you are in the shower. Where can you learn more? Go to https://[employer]. org and sign in to your [employer] account. Enter [phone] in the [facility] box to learn more about \"Preventing Falls: Care Instructions. \"     If you do not have an account, please click on the \"Sign Up Now\" link. Current as of: September 8, 2021               Content Version: 13.2  © 2006-2022 Healthwise, Incorporated. Care instructions adapted under license by [facility] ([facility]). If you have questions about a medical condition or this instruction, always ask your healthcare professional. [place] [address] any warranty or liability for your use of this information.

## 2022-06-16 NOTE — Clinical Note
Dr. Aden Vasquez,    Please see note from this patient's audiogram from today. Please let me know if there is anything further you need.         Murali Bustamante  Audiologist

## 2022-09-15 ENCOUNTER — OFFICE VISIT (OUTPATIENT)
Dept: ENT CLINIC | Age: 72
End: 2022-09-15
Payer: MEDICARE

## 2022-09-15 VITALS
SYSTOLIC BLOOD PRESSURE: 135 MMHG | HEIGHT: 69 IN | WEIGHT: 214 LBS | RESPIRATION RATE: 16 BRPM | HEART RATE: 66 BPM | BODY MASS INDEX: 31.7 KG/M2 | DIASTOLIC BLOOD PRESSURE: 79 MMHG

## 2022-09-15 DIAGNOSIS — J34.89 NASAL OBSTRUCTION: ICD-10-CM

## 2022-09-15 DIAGNOSIS — J31.0 CHRONIC RHINITIS: ICD-10-CM

## 2022-09-15 DIAGNOSIS — H90.3 SENSORINEURAL HEARING LOSS (SNHL) OF BOTH EARS: ICD-10-CM

## 2022-09-15 DIAGNOSIS — R42 VERTIGO: Primary | ICD-10-CM

## 2022-09-15 PROCEDURE — 1036F TOBACCO NON-USER: CPT | Performed by: STUDENT IN AN ORGANIZED HEALTH CARE EDUCATION/TRAINING PROGRAM

## 2022-09-15 PROCEDURE — 99214 OFFICE O/P EST MOD 30 MIN: CPT | Performed by: STUDENT IN AN ORGANIZED HEALTH CARE EDUCATION/TRAINING PROGRAM

## 2022-09-15 PROCEDURE — 3017F COLORECTAL CA SCREEN DOC REV: CPT | Performed by: STUDENT IN AN ORGANIZED HEALTH CARE EDUCATION/TRAINING PROGRAM

## 2022-09-15 PROCEDURE — 1123F ACP DISCUSS/DSCN MKR DOCD: CPT | Performed by: STUDENT IN AN ORGANIZED HEALTH CARE EDUCATION/TRAINING PROGRAM

## 2022-09-15 PROCEDURE — G8427 DOCREV CUR MEDS BY ELIG CLIN: HCPCS | Performed by: STUDENT IN AN ORGANIZED HEALTH CARE EDUCATION/TRAINING PROGRAM

## 2022-09-15 PROCEDURE — G8417 CALC BMI ABV UP PARAM F/U: HCPCS | Performed by: STUDENT IN AN ORGANIZED HEALTH CARE EDUCATION/TRAINING PROGRAM

## 2022-09-15 RX ORDER — AZELASTINE 1 MG/ML
2 SPRAY, METERED NASAL 2 TIMES DAILY
Qty: 30 ML | Refills: 3 | Status: SHIPPED | OUTPATIENT
Start: 2022-09-15

## 2022-09-15 NOTE — PROGRESS NOTES
[facility] (:  1950) is a 71 y.o. male, here for evaluation of the following chief complaint(s):  Follow-up (Sinus and vertigo)      ASSESSMENT/PLAN:  1. Vertigo  2. Chronic rhinitis  3. Nasal obstruction  4. Sensorineural hearing loss (SNHL) of both ears      This is a very pleasant 71 y.o. male here today for evaluation of the the above-noted complaints.      -Continue azelastine. Prescription was provided for the patient today. He continues to experience symptoms, will consider switching to Atrovent. Previously failed Flonase.  -Consideration of nasal airway surgery if failure of medical therapy.  -Sensorineural hearing loss. Consideration of hearing aids if he feels he would benefit from  -Dizziness and vertigo may be related to vestibular migraines, balance. Start home exercises and will consider dizzy therapy if symptoms return      SUBJECTIVE/OBJECTIVE:  Dizziness    Ear Fullness       Tiff Castellanos is here today for evaluation of  Issues related to dizziness. He also has associated ringing in his ears and issues with balance. His dizziness feels like a swaying sensation and started about 1 month ago. He gets constant ringing in his ears. Of note, he has a history of occupational noise exposure. Tiff Castellanos noted tinnitus, dizziness, imbalance, history of occupational noise exposure and family history of hearing loss. Patient reports episodes of dizziness which he describes as a swaying sensation that is intermittent in nature. This began about a month ago and has become more frequent in the past few weeks. He also notes left sided tinnitus that is constant in nature. He has a history of working in construction. Patient also reports his father has a history of hearing loss. Audiogram from 3/10/2021:    AU:  Hearing WNL sloping to Severe SNHL, Excellent WRS, Type A tymps  Update 5/1/2021:    Patient presents today for follow-up. He recently completed vestibular therapy and his symptoms have since resolved. He is essentially back to his baseline. 6/16/2022:  -Constant sinus drainage for months  -Doing sinus irrigations  -Occasional use of fluticasone  -Constant sore throat  -Vertigo comes and goes  -difficulty with pressure changes in car  -Recently switched from [facility] to [employer]    Update 9/15/2022:    -Improvement in sinus drainage with azelastine  -Vertigo has resolved  -Not using fluticasone  -Sore throat has resolved    REVIEW OF SYSTEMS  The following systems were reviewed and revealed the following in addition to any already discussed in the HPI:    PHYSICAL EXAM    GENERAL: No acute distress, alert and oriented, no hoarseness, strong voice  EYES: EOMI, Anti-icteric  HENT:   Head: Normocephalic and atraumatic. Face:  Symmetric, facial nerve intact, no sinus tenderness  Right Ear: Normal external ear, normal external auditory canal, intact tympanic membrane with normal mobility and aerated middle ear  Left Ear: Normal external ear, normal external auditory canal, intact tympanic membrane with normal mobility and aerated middle ear  Mouth/Oral Cavity:  normal lips, Uvula is midline, no mucosal lesions, no trismus, normal dentition, normal salivary quality/flow  Oropharynx/Larynx:  normal oropharynx, normal tonsils; patient did not tolerate mirror exam due to excessive gag reflex  Nose:Normal external nasal appearance. Anterior rhinoscopy shows  a deviated septum preventing view posteriorly. turbinates. Normal mucosa.   No change from prior visit    Nasal Endoscopy (CPT code 76895) from prior visit       Due to the patients chronic sinus disease and/or history of sinonasal neoplasm for surveillance a nasal endoscopy with or without debridement will be performed to complete a significant physical examination of the patient which cannot be performed by anterior rhinoscopy alone (failure of complete examination of the paranasal sinuses). Failure to provide this procedure may lead to late detection of significant chronic benign disease, acute exacerbation, resolution or failure of early diagnosis of recurrent cancer. The procedure report is present in the body of the chart. Verbal consent was received. After topical anesthesia and decongestion had been obtained using aerosolized 1% lidocaine and oxymetazoline, a 45 degree rigid endoscope was placed into both nares with the patient in a sitting position. The following was observed:        Septum: intact and deviated   Other:   -The inferior and middle turbinates were examined. The middle meatus, and sphenoethmoid recess was examined bilaterally.    -Clear secretions in the bilateral sinonasal cavities. No evidence of underlying infection. Appears to be allergic changes. .   -There were no complications. Tolerated well without complication. I attest that I was present for and did the entire procedure myself. This note was generated completely or in part utilizing Dragon dictation speech recognition software. Occasionally, words are mistranscribed and despite editing, the text may contain inaccuracies due to incorrect word recognition. If further clarification is needed please contact the office at [phone]. An electronic signature was used to authenticate this note.     --Roselia Summers MD

## 2022-10-08 ENCOUNTER — APPOINTMENT (OUTPATIENT)
Dept: CT IMAGING | Age: 72
End: 2022-10-08
Payer: MEDICARE

## 2022-10-08 ENCOUNTER — HOSPITAL ENCOUNTER (EMERGENCY)
Age: 72
Discharge: HOME | End: 2022-10-08
Attending: EMERGENCY MEDICINE
Payer: MEDICARE

## 2022-10-08 VITALS
RESPIRATION RATE: 16 BRPM | HEART RATE: 78 BPM | TEMPERATURE: 97.7 F | BODY MASS INDEX: 31.64 KG/M2 | WEIGHT: 221 LBS | HEIGHT: 70 IN | DIASTOLIC BLOOD PRESSURE: 84 MMHG | OXYGEN SATURATION: 97 % | SYSTOLIC BLOOD PRESSURE: 154 MMHG

## 2022-10-08 DIAGNOSIS — K57.32 DIVERTICULITIS OF COLON: Primary | ICD-10-CM

## 2022-10-08 LAB
A/G RATIO: 1.5 (ref 1.1–2.2)
ALBUMIN SERPL-MCNC: 4 G/DL (ref 3.4–5)
ALP BLD-CCNC: 64 U/L (ref 40–129)
ALT SERPL-CCNC: 20 U/L (ref 10–40)
ANION GAP SERPL CALCULATED.3IONS-SCNC: 11 MMOL/L (ref 3–16)
AST SERPL-CCNC: 20 U/L (ref 15–37)
BASOPHILS ABSOLUTE: 0 K/UL (ref 0–0.2)
BASOPHILS RELATIVE PERCENT: 0.1 %
BILIRUB SERPL-MCNC: 0.9 MG/DL (ref 0–1)
BILIRUBIN URINE: NEGATIVE
BLOOD, URINE: NEGATIVE
BUN BLDV-MCNC: 19 MG/DL (ref 7–20)
CALCIUM SERPL-MCNC: 8.9 MG/DL (ref 8.3–10.6)
CHLORIDE BLD-SCNC: 106 MMOL/L (ref 99–110)
CLARITY: CLEAR
CO2: 23 MMOL/L (ref 21–32)
COLOR: YELLOW
CREAT SERPL-MCNC: 0.8 MG/DL (ref 0.8–1.3)
EOSINOPHILS ABSOLUTE: 0.2 K/UL (ref 0–0.6)
EOSINOPHILS RELATIVE PERCENT: 1.7 %
GFR AFRICAN AMERICAN: >60
GFR NON-AFRICAN AMERICAN: >60
GLUCOSE BLD-MCNC: 121 MG/DL (ref 70–99)
GLUCOSE URINE: NEGATIVE MG/DL
HCT VFR BLD CALC: 46.5 % (ref 40.5–52.5)
HEMOGLOBIN: 15.5 G/DL (ref 13.5–17.5)
IMMATURE GRANULOCYTES #: 0 K/UL (ref 0–0.2)
IMMATURE GRANULOCYTES %: 0.1 %
KETONES, URINE: NEGATIVE MG/DL
LEUKOCYTE ESTERASE, URINE: NEGATIVE
LIPASE: 19 U/L (ref 13–60)
LYMPHOCYTES ABSOLUTE: 0.8 K/UL (ref 1–5.1)
LYMPHOCYTES RELATIVE PERCENT: 7.5 %
MCH RBC QN AUTO: 31.5 PG (ref 26–34)
MCHC RBC AUTO-ENTMCNC: 33.3 G/DL (ref 32–36.4)
MCV RBC AUTO: 94.5 FL (ref 80–100)
MICROSCOPIC EXAMINATION: NORMAL
MONOCYTES ABSOLUTE: 0.8 K/UL (ref 0–1.3)
MONOCYTES RELATIVE PERCENT: 7.7 %
NEUTROPHILS ABSOLUTE: 8.3 K/UL (ref 1.7–7.7)
NEUTROPHILS RELATIVE PERCENT: 82.9 %
NITRITE, URINE: NEGATIVE
PDW BLD-RTO: 12.7 % (ref 12.4–15.4)
PH UA: 5.5 (ref 5–8)
PLATELET # BLD: 138 K/UL (ref 135–450)
PMV BLD AUTO: 11 FL (ref 5–10.5)
POTASSIUM REFLEX MAGNESIUM: 4.2 MMOL/L (ref 3.5–5.1)
PROTEIN UA: NEGATIVE MG/DL
RBC # BLD: 4.92 M/UL (ref 4.2–5.9)
SODIUM BLD-SCNC: 140 MMOL/L (ref 136–145)
SPECIFIC GRAVITY UA: 1.02 (ref 1–1.03)
TOTAL PROTEIN: 6.6 G/DL (ref 6.4–8.2)
URINE REFLEX TO CULTURE: NORMAL
URINE TYPE: NORMAL
UROBILINOGEN, URINE: 0.2 E.U./DL
WBC # BLD: 10 K/UL (ref 4–11)

## 2022-10-08 PROCEDURE — 2580000003 HC RX 258: Performed by: EMERGENCY MEDICINE

## 2022-10-08 PROCEDURE — 74177 CT ABD & PELVIS W/CONTRAST: CPT

## 2022-10-08 PROCEDURE — 6360000004 HC RX CONTRAST MEDICATION: Performed by: EMERGENCY MEDICINE

## 2022-10-08 PROCEDURE — 96374 THER/PROPH/DIAG INJ IV PUSH: CPT

## 2022-10-08 PROCEDURE — 80053 COMPREHEN METABOLIC PANEL: CPT

## 2022-10-08 PROCEDURE — 36415 COLL VENOUS BLD VENIPUNCTURE: CPT

## 2022-10-08 PROCEDURE — 85025 COMPLETE CBC W/AUTO DIFF WBC: CPT

## 2022-10-08 PROCEDURE — 81003 URINALYSIS AUTO W/O SCOPE: CPT

## 2022-10-08 PROCEDURE — 83690 ASSAY OF LIPASE: CPT

## 2022-10-08 PROCEDURE — 6370000000 HC RX 637 (ALT 250 FOR IP): Performed by: EMERGENCY MEDICINE

## 2022-10-08 PROCEDURE — 6360000002 HC RX W HCPCS: Performed by: EMERGENCY MEDICINE

## 2022-10-08 PROCEDURE — 99285 EMERGENCY DEPT VISIT HI MDM: CPT

## 2022-10-08 PROCEDURE — 96375 TX/PRO/DX INJ NEW DRUG ADDON: CPT

## 2022-10-08 RX ORDER — ONDANSETRON 4 MG/1
4 TABLET, ORALLY DISINTEGRATING ORAL EVERY 8 HOURS PRN
Qty: 20 TABLET | Refills: 0 | Status: SHIPPED | OUTPATIENT
Start: 2022-10-08 | End: 2022-10-28

## 2022-10-08 RX ORDER — NAPROXEN 500 MG/1
500 TABLET ORAL 2 TIMES DAILY WITH MEALS
Qty: 60 TABLET | Refills: 5 | Status: SHIPPED | OUTPATIENT
Start: 2022-10-08 | End: 2022-10-28

## 2022-10-08 RX ORDER — FENTANYL CITRATE 50 UG/ML
25 INJECTION, SOLUTION INTRAMUSCULAR; INTRAVENOUS ONCE
Status: COMPLETED | OUTPATIENT
Start: 2022-10-08 | End: 2022-10-08

## 2022-10-08 RX ORDER — DICYCLOMINE HYDROCHLORIDE 10 MG/1
10 CAPSULE ORAL EVERY 6 HOURS PRN
Qty: 20 CAPSULE | Refills: 0 | Status: SHIPPED | OUTPATIENT
Start: 2022-10-08 | End: 2022-10-28

## 2022-10-08 RX ORDER — CIPROFLOXACIN 500 MG/1
500 TABLET, FILM COATED ORAL 2 TIMES DAILY
Qty: 14 TABLET | Refills: 0 | Status: SHIPPED | OUTPATIENT
Start: 2022-10-08 | End: 2022-10-15

## 2022-10-08 RX ORDER — 0.9 % SODIUM CHLORIDE 0.9 %
500 INTRAVENOUS SOLUTION INTRAVENOUS ONCE
Status: COMPLETED | OUTPATIENT
Start: 2022-10-08 | End: 2022-10-08

## 2022-10-08 RX ORDER — CIPROFLOXACIN 500 MG/1
500 TABLET, FILM COATED ORAL ONCE
Status: COMPLETED | OUTPATIENT
Start: 2022-10-08 | End: 2022-10-08

## 2022-10-08 RX ORDER — METRONIDAZOLE 500 MG/1
500 TABLET ORAL 2 TIMES DAILY
Qty: 14 TABLET | Refills: 0 | Status: SHIPPED | OUTPATIENT
Start: 2022-10-08 | End: 2022-10-15

## 2022-10-08 RX ORDER — ONDANSETRON 2 MG/ML
4 INJECTION INTRAMUSCULAR; INTRAVENOUS ONCE
Status: COMPLETED | OUTPATIENT
Start: 2022-10-08 | End: 2022-10-08

## 2022-10-08 RX ORDER — METRONIDAZOLE 500 MG/1
500 TABLET ORAL ONCE
Status: COMPLETED | OUTPATIENT
Start: 2022-10-08 | End: 2022-10-08

## 2022-10-08 RX ORDER — HYDROCODONE BITARTRATE AND ACETAMINOPHEN 5; 325 MG/1; MG/1
1 TABLET ORAL ONCE
Status: COMPLETED | OUTPATIENT
Start: 2022-10-08 | End: 2022-10-08

## 2022-10-08 RX ADMIN — METRONIDAZOLE 500 MG: 500 TABLET ORAL at 06:19

## 2022-10-08 RX ADMIN — ONDANSETRON 4 MG: 2 INJECTION INTRAMUSCULAR; INTRAVENOUS at 04:48

## 2022-10-08 RX ADMIN — HYDROCODONE BITARTRATE AND ACETAMINOPHEN 1 TABLET: 5; 325 TABLET ORAL at 06:18

## 2022-10-08 RX ADMIN — FENTANYL CITRATE 25 MCG: 50 INJECTION INTRAMUSCULAR; INTRAVENOUS at 04:48

## 2022-10-08 RX ADMIN — SODIUM CHLORIDE 500 ML: 9 INJECTION, SOLUTION INTRAVENOUS at 04:47

## 2022-10-08 RX ADMIN — CIPROFLOXACIN 500 MG: 500 TABLET, FILM COATED ORAL at 06:18

## 2022-10-08 RX ADMIN — IOPAMIDOL 100 ML: 755 INJECTION, SOLUTION INTRAVENOUS at 05:24

## 2022-10-08 ASSESSMENT — ENCOUNTER SYMPTOMS
VOMITING: 0
ABDOMINAL PAIN: 1
DIARRHEA: 0
COLOR CHANGE: 0
BACK PAIN: 0
PHOTOPHOBIA: 0
RHINORRHEA: 0
CONSTIPATION: 0
BLOOD IN STOOL: 0
ABDOMINAL DISTENTION: 0
SHORTNESS OF BREATH: 0
NAUSEA: 0
WHEEZING: 0

## 2022-10-08 ASSESSMENT — PAIN - FUNCTIONAL ASSESSMENT
PAIN_FUNCTIONAL_ASSESSMENT: ACTIVITIES ARE NOT PREVENTED
PAIN_FUNCTIONAL_ASSESSMENT: 0-10
PAIN_FUNCTIONAL_ASSESSMENT: NONE - DENIES PAIN
PAIN_FUNCTIONAL_ASSESSMENT: ACTIVITIES ARE NOT PREVENTED
PAIN_FUNCTIONAL_ASSESSMENT: ACTIVITIES ARE NOT PREVENTED

## 2022-10-08 ASSESSMENT — PAIN SCALES - GENERAL
PAINLEVEL_OUTOF10: 3
PAINLEVEL_OUTOF10: 4
PAINLEVEL_OUTOF10: 8
PAINLEVEL_OUTOF10: 3

## 2022-10-08 ASSESSMENT — PAIN DESCRIPTION - DESCRIPTORS
DESCRIPTORS: ACHING

## 2022-10-08 ASSESSMENT — PAIN DESCRIPTION - FREQUENCY
FREQUENCY: CONTINUOUS
FREQUENCY: CONTINUOUS

## 2022-10-08 ASSESSMENT — PAIN DESCRIPTION - LOCATION
LOCATION: ABDOMEN

## 2022-10-08 ASSESSMENT — PAIN DESCRIPTION - ORIENTATION
ORIENTATION: ANTERIOR

## 2022-10-08 ASSESSMENT — PAIN DESCRIPTION - ONSET
ONSET: PROGRESSIVE
ONSET: PROGRESSIVE

## 2022-10-08 ASSESSMENT — PAIN DESCRIPTION - PAIN TYPE
TYPE: ACUTE PAIN
TYPE: ACUTE PAIN

## 2022-10-08 NOTE — ED PROVIDER NOTES
[facility]  EMERGENCY DEPARTMENTENCOUNTER      Pt Name: Francesca Brown  MRN: [id number]  [place] 1950  Date ofevaluation: 10/8/2022  Provider: Adams Gregg MD    CHIEF COMPLAINT       Chief Complaint   Patient presents with    Abdominal Pain     Pt states ongoing abd pain for 1 day         HISTORY OF PRESENT ILLNESS   (Location/Symptom, Timing/Onset,Context/Setting, Quality, Duration, Modifying Factors, Severity)  Note limiting factors. Francesca Brown is a 71 y.o. male  who  has a past medical history of Asthma, Diverticulitis, Influenza A, Irritable bowel syndrome, Prostatitis, and Seasonal allergies. who presents to the emergency department for evaluation of left lower quadrant abdominal pain. Patient reports that for the past week he has had a generalized sensation of not feeling well. Reports a 1 day history of left lower quadrant abdominal pain which has been persistent since onset. It is worse with sitting and or bending factors. Denies fevers nausea vomiting or changes in bowel or urine function. Reports a history of diverticulitis and states he feels similar. Previous history of abdominal surgeries. States he does follow with gastroenterology. He has not taken medications for symptoms. HPI    NursingNotes were reviewed. REVIEW OF SYSTEMS    (2-9 systems for level 4, 10 or more for level 5)     Review of Systems   Constitutional:  Negative for activity change, chills and fever. HENT:  Negative for congestion and rhinorrhea. Eyes:  Negative for photophobia and visual disturbance. Respiratory:  Negative for shortness of breath and wheezing. Cardiovascular:  Negative for palpitations and leg swelling. Gastrointestinal:  Positive for abdominal pain. Negative for abdominal distention, blood in stool, constipation, diarrhea, nausea and vomiting. Endocrine: Negative for polydipsia and polyuria.    Genitourinary:  Negative for decreased urine volume, difficulty urinating, frequency and urgency. Musculoskeletal:  Negative for back pain and gait problem. Skin:  Negative for color change and rash. Neurological:  Negative for light-headedness and headaches. Psychiatric/Behavioral:  Negative for confusion. The patient is not nervous/anxious. Except as noted above the remainder of the review of systems was reviewed and negative.        PAST MEDICAL HISTORY     Past Medical History:   Diagnosis Date    Asthma     Diverticulitis     Influenza A 01/05/2020    Irritable bowel syndrome     Prostatitis     Seasonal allergies          SURGICALHISTORY       Past Surgical History:   Procedure Laterality Date    COLONOSCOPY  9/4/2012    Dr. Mesfin Ricketts, check in 5 years    COLONOSCOPY  09/06/2017    dr rhodes at [facility] [place] [facility] polyp, repeat 5 years    HERNIA REPAIR  83/14/3136    umbilical    NASAL SINUS SURGERY  about 1998         CURRENT MEDICATIONS       Previous Medications    AZELASTINE (ASTELIN) 0.1 % NASAL SPRAY    2 sprays by Nasal route 2 times daily Use in each nostril as directed    DICLOFENAC (VOLTAREN) 50 MG EC TABLET    Take 1 tablet by mouth 2 times daily (with meals)    FLUTICASONE (FLONASE) 50 MCG/ACT NASAL SPRAY    2 sprays by Nasal route 2 times daily    FLUTICASONE (VERAMYST) 27.5 MCG/SPRAY NASAL SPRAY    2 sprays by Each Nostril route daily     GLUCOSAMINE-CHONDROITIN 500-400 MG CAPS    Take by mouth daily     LORATADINE (CLARITIN) 10 MG TABLET    Take 10 mg by mouth daily    MAGNESIUM 100 MG CAPS    Take 400 mg by mouth daily     MAGNESIUM CITRATE SOLUTION    Take 296 mLs by mouth once for 1 dose    MONTELUKAST (SINGULAIR) 10 MG TABLET    Take 1 tablet by mouth daily    MULTIPLE VITAMINS-MINERALS (THERAPEUTIC MULTIVITAMIN-MINERALS) TABLET    Take 1 tablet by mouth daily    SULFAMETHOXAZOLE-TRIMETHOPRIM (BACTRIM DS;SEPTRA DS) 800-160 MG PER TABLET    Take 1 tablet by mouth 2 times daily    TAMSULOSIN (FLOMAX) 0.4 MG CAPSULE    Take 0.4 mg by mouth daily TRAZODONE (DESYREL) 50 MG TABLET    Take 1 tablet by mouth nightly    TRULANCE 3 MG TABS    Take 3 mg by mouth daily    TURMERIC 500 MG CAPS    Take by mouth daily     VITAMIN C (ASCORBIC ACID) 500 MG TABLET    Take 1,000 mg by mouth daily            Penicillins    FAMILY HISTORY       Family History   Problem Relation Age of Onset    Cancer Father         colon    High Blood Pressure Father           SOCIAL HISTORY       Social History     Socioeconomic History    Marital status:    Tobacco Use    Smoking status: Never    Smokeless tobacco: Never   Vaping Use    Vaping Use: Never used   Substance and Sexual Activity    Alcohol use: Yes     Comment: social use    Drug use: No    Sexual activity: Yes     Partners: Female     Social Determinants of Health     Financial Resource Strain: Low Risk     Difficulty of Paying Living Expenses: Not hard at all   Food Insecurity: No Food Insecurity    Worried About Running Out of Food in the Last Year: Never true    Ran Out of Food in the Last Year: Never true       SCREENINGS    Celine Coma Scale  Eye Opening: Spontaneous  Best Verbal Response: Oriented  Best Motor Response: Obeys commands  [place] Coma Scale Score: 15        PHYSICAL EXAM    (up to 7 for level 4, 8 or more for level 5)     ED Triage Vitals [10/08/22 0427]   BP Temp Temp Source Heart Rate Resp SpO2 Height Weight   (!) 159/88 97.7 °F (36.5 °C) Oral 80 16 96 % 5' 10\" (1.778 m) 221 lb (100.2 kg)       Physical Exam  Constitutional:       General: He is not in acute distress. Appearance: He is well-developed. HENT:      Head: Normocephalic and atraumatic. Eyes:      Conjunctiva/sclera: Conjunctivae normal.   Neck:      Trachea: No tracheal deviation. Cardiovascular:      Rate and Rhythm: Normal rate and regular rhythm. Pulmonary:      Effort: Pulmonary effort is normal.      Breath sounds: Normal breath sounds. No wheezing or rales. Abdominal:      General: There is no distension. Palpations: Abdomen is soft. Tenderness: There is abdominal tenderness in the left lower quadrant. There is no guarding or rebound. Musculoskeletal:         General: No deformity. Normal range of motion. Cervical back: Normal range of motion. Skin:     General: Skin is warm and dry. Neurological:      Mental Status: He is alert and oriented to person, place, and time. RESULTS     EKG: All EKG's are interpreted by the Emergency Department Physician who either signs or Co-signsthis chart in the absence of a cardiologist.        RADIOLOGY:   [facility] such as CT, Ultrasound and MRI are read by the radiologist. Esther Clark radiographic images are visualized and preliminarily interpreted by the emergency physician with the below findings:        Interpretation per the Radiologist below, if available at the time ofthis note:    CT ABDOMEN PELVIS W IV CONTRAST Additional Contrast? None    (Results Pending)         ED BEDSIDE ULTRASOUND:   Performed by ED Physician - none    LABS:  Labs Reviewed   CBC WITH AUTO DIFFERENTIAL - Abnormal; Notable for the following components:       Result Value    MPV 11.0 (*)     Neutrophils Absolute 8.3 (*)     Lymphocytes Absolute 0.8 (*)     All other components within normal limits   COMPREHENSIVE METABOLIC PANEL W/ REFLEX TO MG FOR LOW K - Abnormal; Notable for the following components:    Glucose 121 (*)     All other components within normal limits   URINALYSIS WITH REFLEX TO CULTURE   LIPASE       All other labs were within normal range or not returned as of this dictation.     EMERGENCY DEPARTMENT COURSE and DIFFERENTIAL DIAGNOSIS/MDM:   Vitals:    Vitals:    10/08/22 0427 10/08/22 0510   BP: (!) 159/88 (!) 154/84   Pulse: 80 78   Resp: 16 16   Temp: 97.7 °F (36.5 °C) 97.7 °F (36.5 °C)   TempSrc: Oral Oral   SpO2: 96% 97%   Weight: 221 lb (100.2 kg)    Height: 5' 10\" (1.778 m)        Patient was given thefollowing medications:  Medications   0.9 % sodium chloride bolus (500 mLs IntraVENous New Bag 10/8/22 0447)   iopamidol (ISOVUE-370) 76 % injection 100 mL (has no administration in time range)   ondansetron (ZOFRAN) injection 4 mg (4 mg IntraVENous Given 10/8/22 0448)   fentaNYL (SUBLIMAZE) injection 25 mcg (25 mcg IntraVENous Given 10/8/22 0448)       ED COURSE & MEDICAL DECISION MAKING    Pertinent Labs & Imaging studies reviewed. (See chart for details)   -  Patient seen and evaluated in the emergency department. -  Triage and nursing notes reviewed and incorporated. -  Old chart records reviewed and incorporated. -  Differential diagnosis includes: Differential diagnosis: Abdominal Aortic Aneurysm, Acute Coronary Syndrome, Ischemic Bowel, Bowel Obstruction (including Gastric Outlet Obstruction), PUD, GERD, Acute Cholecystitis, Pancreatitis, Hepatitis, Colitis, SMA Syndrome, Mesenteric Steal Syndrome, Splanchnic Vein Thrombosis, other    -  Work-up included:  See above  -  ED treatment included: See above  -  Results discussed with patient. Patient with a history of diverticulosis presents to the ED for evaluation of abdominal pain he states is similar to previous episodes of diverticulosis. labs show no emergent abnormalities. Imaging studies show uncomplicated diverticulosis. Patient provided oral antibiotics and analgesics. patient feels improved on reevaluation. Symptomatic treatment with expectant management discussed with the patient and they and/or family members present are amenable to treatment plan and outpatient follow-up. Strict return precautions were discussed with the patient and those present. They demonstrated understanding of when to return to the emergency department for new or worsening symptoms. .  The patient is agreeable with plan of care and disposition. Is this patient to be included in the SEP-1 Core Measure due to severe sepsis or septic shock?    No   Exclusion criteria - the patient is NOT to be included for SEP-1 Core Measure due to:  2+ SIRS criteria are not met      REASSESSMENT          CRITICAL CARE TIME   Total Critical Care time was 0 minutes, excluding separately reportable procedures. There was a high probability of clinically significant/life threatening deterioration in the patient's condition which required my urgent intervention. CONSULTS:  None    PROCEDURES:  Unless otherwise noted below, none     Procedures    FINAL IMPRESSION      1. Diverticulitis of colon          DISPOSITION/PLAN   DISPOSITION        PATIENT REFERREDTO:  No follow-up provider specified.     DISCHARGEMEDICATIONS:  New Prescriptions    No medications on file          (Please note that portions of this note were completed with a voice recognition program.  Efforts were made to edit the dictations but occasionally words are mis-transcribed.)    Raquel Soni MD (electronically signed)  Attending Emergency Physician          Raquel Soni MD  10/08/22 3145

## 2022-10-28 ENCOUNTER — OFFICE VISIT (OUTPATIENT)
Dept: FAMILY MEDICINE CLINIC | Age: 72
End: 2022-10-28
Payer: MEDICARE

## 2022-10-28 VITALS
BODY MASS INDEX: 30.06 KG/M2 | SYSTOLIC BLOOD PRESSURE: 112 MMHG | HEART RATE: 80 BPM | HEIGHT: 70 IN | WEIGHT: 210 LBS | DIASTOLIC BLOOD PRESSURE: 64 MMHG | TEMPERATURE: 97.7 F

## 2022-10-28 DIAGNOSIS — K57.92 DIVERTICULITIS: Primary | ICD-10-CM

## 2022-10-28 DIAGNOSIS — R42 DIZZY: ICD-10-CM

## 2022-10-28 PROCEDURE — 1123F ACP DISCUSS/DSCN MKR DOCD: CPT | Performed by: FAMILY MEDICINE

## 2022-10-28 PROCEDURE — 1036F TOBACCO NON-USER: CPT | Performed by: FAMILY MEDICINE

## 2022-10-28 PROCEDURE — G8427 DOCREV CUR MEDS BY ELIG CLIN: HCPCS | Performed by: FAMILY MEDICINE

## 2022-10-28 PROCEDURE — 3017F COLORECTAL CA SCREEN DOC REV: CPT | Performed by: FAMILY MEDICINE

## 2022-10-28 PROCEDURE — G8417 CALC BMI ABV UP PARAM F/U: HCPCS | Performed by: FAMILY MEDICINE

## 2022-10-28 PROCEDURE — 99214 OFFICE O/P EST MOD 30 MIN: CPT | Performed by: FAMILY MEDICINE

## 2022-10-28 PROCEDURE — G8484 FLU IMMUNIZE NO ADMIN: HCPCS | Performed by: FAMILY MEDICINE

## 2022-10-28 RX ORDER — ALBUTEROL SULFATE 90 UG/1
2 AEROSOL, METERED RESPIRATORY (INHALATION) 4 TIMES DAILY PRN
Qty: 18 G | Refills: 0 | Status: SHIPPED | OUTPATIENT
Start: 2022-10-28

## 2022-10-28 ASSESSMENT — PATIENT HEALTH QUESTIONNAIRE - PHQ9
SUM OF ALL RESPONSES TO PHQ QUESTIONS 1-9: 0
1. LITTLE INTEREST OR PLEASURE IN DOING THINGS: 0
2. FEELING DOWN, DEPRESSED OR HOPELESS: 0
SUM OF ALL RESPONSES TO PHQ9 QUESTIONS 1 & 2: 0
SUM OF ALL RESPONSES TO PHQ QUESTIONS 1-9: 0

## 2022-10-28 NOTE — PATIENT INSTRUCTIONS
Do use metamucil daily as a fiber supplement  You can hold the flomax for the next 3 days  See if that helps the periodic dizzy feelings   See me in about 5 months

## 2022-10-28 NOTE — PROGRESS NOTES
Subjective:      Patient ID: Fercho Tobias is a 71 y.o. male. Chief Complaint   Patient presents with    Follow-Up from [address] [place] [address] 10-8-2022 \"diverticulitis\"    Medication Refill     Ventolin inhaler    Other     Belching more than usual         Patient presents with:   Follow-Up from Hospital: [facility] 10-8-2022 \"diverticulitis\"  Medication Refill: Ventolin inhaler  Other: Belching more than usual       Here for the above  She is better with diverticulitis   Lot of belching for two months  No gerd  No dysphagia   No abd pain     No fever  No cough  No sob no cp     We talked about the dizzy   He notes that the flomax seems to have caused and he notes sx since starting    YOB: 1950    Date of Visit:  10/28/2022     -- Penicillins -- Hives    Current Outpatient Medications:  azelastine (ASTELIN) 0.1 % nasal spray, 2 sprays by Nasal route 2 times daily Use in each nostril as directed, Disp: 30 mL, Rfl: 3  tamsulosin (FLOMAX) 0.4 MG capsule, Take 0.4 mg by mouth daily, Disp: , Rfl:   Magnesium 100 MG CAPS, Take 400 mg by mouth daily , Disp: , Rfl:   Turmeric 500 MG CAPS, Take by mouth daily , Disp: , Rfl:   vitamin C (ASCORBIC ACID) 500 MG tablet, Take 1,000 mg by mouth daily, Disp: , Rfl:   loratadine (CLARITIN) 10 MG tablet, Take 10 mg by mouth daily, Disp: , Rfl:    Multiple Vitamins-Minerals (THERAPEUTIC MULTIVITAMIN-MINERALS) tablet, Take 1 tablet by mouth daily, Disp: , Rfl:   glucosamine-chondroitin 500-400 MG CAPS, Take by mouth daily , Disp: , Rfl:    magnesium citrate solution, Take 296 mLs by mouth once for 1 dose, Disp: 296 mL, Rfl: 0    No current facility-administered medications for this visit.      ---------------------------               10/28/22                      1418         ---------------------------   BP:          112/64         Site:    Left Upper Arm     Position:     Sitting        Cuff Size:   Large Adult      Pulse:         80 Temp:   97.7 °F (36.5 °C)   TempSrc:    Temporal        Weight: 210 lb (95.3 kg)    Height: 5' 10\" (1.778 m)   ---------------------------  Body mass index is 30.13 kg/m². Wt Readings from Last 3 Encounters:  10/28/22 : 210 lb (95.3 kg)  10/08/22 : 221 lb (100.2 kg)  09/15/22 : 214 lb (97.1 kg)    BP Readings from Last 3 Encounters:  10/28/22 : 112/64  10/08/22 : Boni Glow 154/84  09/15/22 : 135/79          Review of Systems    Objective:   Physical Exam  Constitutional:       General: He is not in acute distress. Appearance: Normal appearance. He is not ill-appearing. Pulmonary:      Effort: Pulmonary effort is normal.      Breath sounds: Normal breath sounds. No decreased breath sounds, wheezing, rhonchi or rales. Abdominal:      General: There is no abdominal bruit. Palpations: Abdomen is soft. There is no hepatomegaly, splenomegaly, mass or pulsatile mass. Tenderness: There is no abdominal tenderness. There is no guarding. Neurological:      Mental Status: He is alert. Assessment:       Diagnosis Orders   1. Diverticulitis        2. Dizzy            He declined flu shot   Recent diverticulitis and doing well now   And recent ER visit  Colon was done last year on 7./13/21 dr Terry Prior and check in 5 years  We discussed his medicines  He thinks the flomax is a concern.  We will try off the med briefly  He is instructed to call     Ct abd noted from 10/8/22  Cmp and cbc both ok on 10/8/22  Reviewed ent note from 9/15/22 for the dizzy       Plan:      Do use metamucil daily as a fiber supplement  You can hold the flomax for the next 3 days  See if that helps the periodic dizzy feelings   See me in about 5 months         Coy Nolasco MD

## 2022-11-02 ENCOUNTER — TELEPHONE (OUTPATIENT)
Dept: FAMILY MEDICINE CLINIC | Age: 72
End: 2022-11-02

## 2022-11-02 NOTE — TELEPHONE ENCOUNTER
That is great.    I will have him see urology group for further recommendations  Stay off the medicine  Nai Mosher  He seen dr Karla Whaley last year and he may have retired

## 2022-11-02 NOTE — TELEPHONE ENCOUNTER
----- Message from Santo Medel sent at 11/2/2022  9:34 AM EDT -----  Subject: Message to Provider    QUESTIONS  Information for Provider? Patient called to say that the Flowmax was   causing dizziness. He stopped taking it on Friday 10/28. Please call   patient.   ---------------------------------------------------------------------------  --------------  Courtney Gutierrez [facility]  [phone]; OK to leave message on voicemail  ---------------------------------------------------------------------------  --------------  SCRIPT ANSWERS  Relationship to Patient?  Self Health Maintenance Due   Topic Date Due   â¢ Hepatitis B Vaccine (2 of 3 - 3-dose primary series) 2019   â¢ IPV Vaccine (1 of 4 - 4-dose series) 01/22/2020   â¢ HIB Vaccine (1 of 4 - Standard series) 01/22/2020   â¢ Rotavirus Vaccine (1 of 3 - 3-dose series) 01/22/2020   â¢ DTaP/Tdap/Td Vaccine (1 - DTaP) 01/22/2020   â¢ Pneumococcal Vaccine 0-64 (1 of 4) 01/22/2020       Patient is due for the topics as listed above and wishes to proceed with them. Vaccine Information Statement(s) was given today. This has been reviewed, questions answered, and verbal consent given by Parent for injection(s) and administration of Haemophilus Influenza type b (Hib), Pediarix, Pneumococcal Conjugate (PCV13) and Rotavirus. Patient tolerated without incident. See immunization grid for documentation.

## 2023-04-18 DIAGNOSIS — R97.20 RISING PSA LEVEL: Primary | ICD-10-CM

## 2023-04-24 ENCOUNTER — OFFICE VISIT (OUTPATIENT)
Dept: FAMILY MEDICINE CLINIC | Age: 73
End: 2023-04-24

## 2023-04-24 VITALS
WEIGHT: 215.8 LBS | BODY MASS INDEX: 30.9 KG/M2 | HEIGHT: 70 IN | TEMPERATURE: 97.2 F | DIASTOLIC BLOOD PRESSURE: 74 MMHG | SYSTOLIC BLOOD PRESSURE: 120 MMHG

## 2023-04-24 DIAGNOSIS — B96.89 ACUTE BACTERIAL SINUSITIS: Primary | ICD-10-CM

## 2023-04-24 DIAGNOSIS — J01.90 ACUTE BACTERIAL SINUSITIS: Primary | ICD-10-CM

## 2023-04-24 RX ORDER — AZITHROMYCIN 250 MG/1
250 TABLET, FILM COATED ORAL SEE ADMIN INSTRUCTIONS
Qty: 6 TABLET | Refills: 0 | Status: SHIPPED | OUTPATIENT
Start: 2023-04-24 | End: 2023-04-29

## 2023-04-24 ASSESSMENT — ENCOUNTER SYMPTOMS
RHINORRHEA: 1
SINUS PAIN: 1
SHORTNESS OF BREATH: 0
ABDOMINAL PAIN: 0
SINUS PRESSURE: 1
WHEEZING: 0
COUGH: 0

## 2023-04-24 NOTE — PROGRESS NOTES
sinus pressure and sinus pain. Respiratory:  Negative for cough, shortness of breath and wheezing. Cardiovascular:  Negative for chest pain and palpitations. Gastrointestinal:  Negative for abdominal pain. Genitourinary:  Negative for dysuria and frequency. Objective   Physical Exam  Vitals and nursing note reviewed. Constitutional:       Appearance: Normal appearance. HENT:      Head:      Comments: Edema bilat. nasal turbinates with drainage . Cardiovascular:      Rate and Rhythm: Normal rate and regular rhythm. Pulmonary:      Effort: No respiratory distress. Abdominal:      General: There is no distension. Tenderness: There is no abdominal tenderness. Neurological:      Mental Status: He is alert.                 An electronic signature was used to authenticate this note.    --Shadia De Leon, DO

## 2023-04-25 ENCOUNTER — HOSPITAL ENCOUNTER (OUTPATIENT)
Dept: PHYSICAL THERAPY | Age: 73
Setting detail: THERAPIES SERIES
Discharge: HOME | End: 2023-04-25
Payer: MEDICARE

## 2023-04-25 PROCEDURE — 97161 PT EVAL LOW COMPLEX 20 MIN: CPT

## 2023-04-25 PROCEDURE — 97112 NEUROMUSCULAR REEDUCATION: CPT

## 2023-04-25 PROCEDURE — 97530 THERAPEUTIC ACTIVITIES: CPT

## 2023-04-25 NOTE — FLOWSHEET NOTE
reaching prior level of function. [] Progressing: [] Met: [] Not Met: [] Adjusted  2. Patient will demonstrate increased AROM to WNL to performs ADLs independently (dressing, grooming, reaching for back pocket)  [] Progressing: [] Met: [] Not Met: [] Adjusted  3. Patient will demonstrate an increase in NM recruitment/activation and overall GH and scapular strength to within WNL for proper functional mobility  without compensatory movement patterns (reaching into wall cabinet, pouring milk/coffee, driving)  [] Progressing: [] Met: [] Not Met: [] Adjusted  4. Patient independent with written home exercise program  [] Progressing: [] Met: [] Not Met: [] Adjusted  5. Patient able to sleep thru night without waking due to shoulder pain. [] Progressing: [] Met: [] Not Met: [] Adjusted       Treatment/Activity Tolerance:  [x] Patient tolerated treatment well [] Patient limited by fatique  [] Patient limited by pain  [] Patient limited by other medical complications  [] Other:     Overall Progression Towards Functional goals/ Treatment Progress Update:  [] Patient is progressing as expected towards functional goals listed. [] Progression is slowed due to complexities/Impairments listed. [] Progression has been slowed due to co-morbidities.   [x] Plan just implemented, too soon to assess goals progression <30days   [] Goals require adjustment due to lack of progress  [] Patient is not progressing as expected and requires additional follow up with physician  [] Other    Prognosis for POC: [x] Good [] Fair  [] Poor    Patient requires continued skilled intervention: [x] Yes  [] No        PLAN:Initiate [place], UE [place], soft tissue mob, scapular mobilization, PROM, joint mob prn, modalities prn.  [] Continue per plan of care [] Alter current plan (see comments)  [x] Plan of care initiated [] Hold pending MD visit [] Discharge    Therapeutic Exercise and NMR EXR  [] (58747) Provided verbal/tactile cueing for activities

## 2023-04-25 NOTE — PLAN OF CARE
[facility] [place] [facility], [facility] [address] [place] [address]  Phone: [phone]   Fax:     [phone]                                                       Peterson Esparza    Dear Dr. Steven Stone,    We had the pleasure of evaluating the following patient for physical therapy services at [facility]. A summary of our findings can be found in the initial assessment below. This includes our plan of care. If you have any questions or concerns regarding these findings, please do not hesitate to contact me at the office phone number checked above. Thank you for the referral.       Physician Signature:_______________________________Date:__________________  By signing above (or electronic signature), therapists plan is approved by physician        Patient: Abimbola Kaur   : 1950   MRN: [id number]  Referring Physician:  Daniel Pozo MD      Evaluation Date: 2023      Medical Diagnosis Information:  Diagnosis: M25.511, G89.s9, M25.512 Chronic Pain of B Shoulders   Treatment Diagnosis: M25.511 R Shoulder Pain, M25.512 L Shoulder Pain                                         Insurance information: PT Insurance Information: Medicare    Precautions/ Contra-indications:   Latex Allergy:   [x]  NO      []YES  Preferred Language for Healthcare:   [x]English       []other:    C-SSRS Triggered by Intake questionnaire (Past 2 wk assessment ):   [x] No, Questionnaire did not trigger screening.   [] Yes, Patient intake triggered C-SSRS Screening      [] C-SSRS Screening completed  [] PCP notified via Epic     SUBJECTIVE: Patient reports a history of B shoulder pain/limited in mobility, has difficulty with exercising at the gym, dressing, reaching. \"Pain is minimal, mobility is the bigger issue. \"  Patient did take a tumble in the yard a few years ago, no major issue, did not get

## 2023-05-02 ENCOUNTER — HOSPITAL ENCOUNTER (OUTPATIENT)
Dept: PHYSICAL THERAPY | Age: 73
Setting detail: THERAPIES SERIES
Discharge: HOME | End: 2023-05-02
Payer: MEDICARE

## 2023-05-02 PROCEDURE — 97140 MANUAL THERAPY 1/> REGIONS: CPT

## 2023-05-02 PROCEDURE — 97110 THERAPEUTIC EXERCISES: CPT

## 2023-05-02 NOTE — FLOWSHEET NOTE
hip and LE for self care, mobility, lifting, and ambulation/stair navigation      Manual Treatments:  PROM / STM / Oscillations-Mobs:  G-I, II, III, IV (PA's, Inf., Post.)  [] (21055) Provided manual therapy to mobilize LE, proximal hip and/or LS spine soft tissue/joints for the purpose of modulating pain, promoting relaxation,  increasing ROM, reducing/eliminating soft tissue swelling/inflammation/restriction, improving soft tissue extensibility and allowing for proper ROM for normal function with self care, mobility, lifting and ambulation. Charges:  Timed Code Treatment Minutes: 55   Total Treatment Minutes: 55      [] EVAL (LOW) 79130 (typically 20 minutes face-to-face)  [] EVAL (MOD) 14247 (typically 30 minutes face-to-face)  [] EVAL (HIGH) 63565 (typically 45 minutes face-to-face)  [] RE-EVAL     [x] YZ(23237) x     [] Dry needle 1 or 2 Muscles (38874)  [] NMR (78955) x     [] Dry needle 3+ Muscles (00304)  [x] Manual (47191) x  3   [] Ultrasound (04264) x  [] TA (33087) x     [] Mech Traction (52397)  [] ES(attended) (63018)     [] ES (un) (73470):   [] Vasopump (47762) [] Ionto (00901)   [] Other:      Electronically signed by: Deonte Keane, PT 2103    Note: If patient does not return for scheduled/recommended follow up visits, this note will serve as a discharge from care along with the most recent update on progress.

## 2023-05-05 ENCOUNTER — HOSPITAL ENCOUNTER (OUTPATIENT)
Dept: PHYSICAL THERAPY | Age: 73
Setting detail: THERAPIES SERIES
Discharge: HOME | End: 2023-05-05
Payer: MEDICARE

## 2023-05-05 PROCEDURE — 97140 MANUAL THERAPY 1/> REGIONS: CPT

## 2023-05-05 PROCEDURE — 97110 THERAPEUTIC EXERCISES: CPT

## 2023-05-05 NOTE — FLOWSHEET NOTE
Progression is slowed due to complexities/Impairments listed. [] Progression has been slowed due to co-morbidities. [x] Plan just implemented, too soon to assess goals progression <30days   [] Goals require adjustment due to lack of progress  [] Patient is not progressing as expected and requires additional follow up with physician  [] Other    Prognosis for POC: [x] Good [] Fair  [] Poor    Patient requires continued skilled intervention: [x] Yes  [] No        PLAN: Monitor response. Instruct patient in theraband ex for HEP. [] Continue per plan of care [] Alter current plan (see comments)  [x] Plan of care initiated [] Hold pending MD visit [] Discharge    Therapeutic Exercise and NMR EXR  [] (87761) Provided verbal/tactile cueing for activities related to strengthening, flexibility, endurance, ROM for improvements in LE, proximal hip, and core control with self care, mobility, lifting, ambulation. [x] (81247) Provided verbal/tactile cueing for activities related to improving balance, coordination, kinesthetic sense, posture, motor skill, proprioception  to assist with LE, proximal hip, and core control in self care, mobility, lifting, ambulation and eccentric single leg control.      NMR and Therapeutic Activities:    [x] (63481 or 71444) Provided verbal/tactile cueing for activities related to improving balance, coordination, kinesthetic sense, posture, motor skill, proprioception and motor activation to allow for proper function of core, proximal hip and LE with self care and ADLs and functional mobility.   [] (30101) Gait Re-education- Provided training and instruction to the patient for proper LE, core and proximal hip recruitment and positioning and eccentric body weight control with ambulation re-education including up and down stairs     Home Exercise Program:    [] (87296) Reviewed/Progressed HEP activities related to strengthening, flexibility, endurance, ROM of core, proximal hip and LE for

## 2023-05-15 ENCOUNTER — APPOINTMENT (OUTPATIENT)
Dept: PHYSICAL THERAPY | Age: 73
End: 2023-05-15
Payer: MEDICARE

## 2023-05-16 ENCOUNTER — HOSPITAL ENCOUNTER (OUTPATIENT)
Dept: PHYSICAL THERAPY | Age: 73
Setting detail: THERAPIES SERIES
Discharge: HOME | End: 2023-05-16
Payer: MEDICARE

## 2023-05-16 PROCEDURE — 97140 MANUAL THERAPY 1/> REGIONS: CPT

## 2023-05-16 PROCEDURE — 97110 THERAPEUTIC EXERCISES: CPT

## 2023-05-16 NOTE — FLOWSHEET NOTE
[facility] - [facility], [address] [place] [address]  Phone: [phone]   Fax:     [phone]      Physical Therapy Treatment Note/ Progress Report:     Date:  2023    Patient Name:  Rula Werner    :  1950  MRN: [id number]    Medical/Treatment Diagnosis Information:  Diagnosis: M25.511, G89.s9, M25.512 Chronic Pain of B Shoulders  Treatment Diagnosis: M25.511 R Shoulder Pain, M25.512 L Shoulder Pain    Insurance/Certification information:  PT Insurance Information: Medicare  Physician Information:  Bryson More MD  Plan of care signed (Y/N): cosign requested    Date of Patient follow up with Physician:      Progress Report: []  Yes  [x]  No     Date Range for reporting period:  Beginnin23  Ending:      Progress report due (10 Rx/or 30 days whichever is less):     Recertification due (POC duration/ or 90 days whichever is less):      Visit # POC/Insurance Allowable Auth Needed   /BMN []Yes   [x]No     Latex Allergy:  [x]NO      []YES  Preferred Language for Healthcare:   [x]English       []other:    History of Injury: Patient reports a history of B shoulder pain/limited in mobility, has difficulty with exercising at the gym, dressing, reaching. \"Pain is minimal, mobility is the bigger issue. \"  Patient did take a tumble in the yard a few years ago, no major issue, did not get it checked out. Patient does a lot of yard work, goes to [employer] 3x/week, arms fatigue with using chain using. \"Both arms are weak\". Patient rolls from side to side. If hold milk out, will notice discomfort when pouring.        Relevant Medical History: Asthma (seasonal), Diverticulitis, Irritable Bowel Syndrome; Vertigo (did undergo PT, has HEP), hernia   Functional Scale/Score:Upper Extremity Functional Index 59 = 73.75% dysfunction (23)  Pain Scale:3 /10    SUBJECTIVE:  See eval  23:  Patient

## 2023-05-18 ENCOUNTER — HOSPITAL ENCOUNTER (OUTPATIENT)
Dept: PHYSICAL THERAPY | Age: 73
Setting detail: THERAPIES SERIES
Discharge: HOME | End: 2023-05-18
Payer: MEDICARE

## 2023-05-18 PROCEDURE — 97110 THERAPEUTIC EXERCISES: CPT

## 2023-05-18 PROCEDURE — 97140 MANUAL THERAPY 1/> REGIONS: CPT

## 2023-05-18 NOTE — FLOWSHEET NOTE
[facility] - [facility], [address] [place] [address]  Phone: [phone]   Fax:     [phone]      Physical Therapy Treatment Note/ Progress Report:     Date:  2023    Patient Name:  Katerine Lincoln    :  1950  MRN: [id number]    Medical/Treatment Diagnosis Information:  Diagnosis: M25.511, G89.s9, M25.512 Chronic Pain of B Shoulders  Treatment Diagnosis: M25.511 R Shoulder Pain, M25.512 L Shoulder Pain    Insurance/Certification information:  PT Insurance Information: Medicare  Physician Information:  Noemy Pappas MD  Plan of care signed (Y/N): cosign requested    Date of Patient follow up with Physician:      Progress Report: []  Yes  [x]  No     Date Range for reporting period:  Beginnin23  Ending:      Progress report due (10 Rx/or 30 days whichever is less):     Recertification due (POC duration/ or 90 days whichever is less):      Visit # POC/Insurance Allowable Auth Needed   /BMN []Yes   [x]No     Latex Allergy:  [x]NO      []YES  Preferred Language for Healthcare:   [x]English       []other:    History of Injury: Patient reports a history of B shoulder pain/limited in mobility, has difficulty with exercising at the gym, dressing, reaching. \"Pain is minimal, mobility is the bigger issue. \"  Patient did take a tumble in the yard a few years ago, no major issue, did not get it checked out. Patient does a lot of yard work, goes to [employer] 3x/week, arms fatigue with using chain using. \"Both arms are weak\". Patient rolls from side to side. If hold milk out, will notice discomfort when pouring.        Relevant Medical History: Asthma (seasonal), Diverticulitis, Irritable Bowel Syndrome; Vertigo (did undergo PT, has HEP), hernia   Functional Scale/Score:Upper Extremity Functional Index 59 = 73.75% dysfunction (23)  Pain Scale:3 /10    SUBJECTIVE:  See eval  23:  Patient

## 2023-05-22 ENCOUNTER — HOSPITAL ENCOUNTER (OUTPATIENT)
Dept: PHYSICAL THERAPY | Age: 73
Setting detail: THERAPIES SERIES
Discharge: HOME | End: 2023-05-22
Payer: MEDICARE

## 2023-05-22 PROCEDURE — 97110 THERAPEUTIC EXERCISES: CPT

## 2023-05-22 PROCEDURE — 97140 MANUAL THERAPY 1/> REGIONS: CPT

## 2023-05-22 NOTE — FLOWSHEET NOTE
[facility] - [facility] Therapy, [address] [place] [address]. [place] [address]  Phone: [phone]   Fax:     [phone]      Physical Therapy Treatment Note/ Progress Report:     Date:  2023    Patient Name:  Dexter Zuleta    :  1950  MRN: [id number]    Medical/Treatment Diagnosis Information:  Diagnosis: M25.511, G89.s9, M25.512 Chronic Pain of B Shoulders  Treatment Diagnosis: M25.511 R Shoulder Pain, M25.512 L Shoulder Pain    Insurance/Certification information:  PT Insurance Information: Medicare  Physician Information:  Ana Tejeda MD  Plan of care signed (Y/N): cosign requested    Date of Patient follow up with Physician:      Progress Report: []  Yes  [x]  No     Date Range for reporting period:  Beginnin23  Ending:      Progress report due (10 Rx/or 30 days whichever is less):     Recertification due (POC duration/ or 90 days whichever is less):      Visit # POC/Insurance Allowable Auth Needed   /BMN []Yes   [x]No     Latex Allergy:  [x]NO      []YES  Preferred Language for Healthcare:   [x]English       []other:    History of Injury: Patient reports a history of B shoulder pain/limited in mobility, has difficulty with exercising at the gym, dressing, reaching. \"Pain is minimal, mobility is the bigger issue. \"  Patient did take a tumble in the yard a few years ago, no major issue, did not get it checked out. Patient does a lot of yard work, goes to [employer] 3x/week, arms fatigue with using chain using. \"Both arms are weak\". Patient rolls from side to side. If hold milk out, will notice discomfort when pouring.        Relevant Medical History: Asthma (seasonal), Diverticulitis, Irritable Bowel Syndrome; Vertigo (did undergo PT, has HEP), hernia   Functional Scale/Score:Upper Extremity Functional Index 59 = 73.75% dysfunction (23)  Pain Scale:3 /10    SUBJECTIVE:  See eval  23:  Patient

## 2023-05-25 ENCOUNTER — HOSPITAL ENCOUNTER (OUTPATIENT)
Dept: PHYSICAL THERAPY | Age: 73
Setting detail: THERAPIES SERIES
Discharge: HOME | End: 2023-05-25
Payer: MEDICARE

## 2023-05-25 PROCEDURE — 97110 THERAPEUTIC EXERCISES: CPT

## 2023-05-25 PROCEDURE — 97140 MANUAL THERAPY 1/> REGIONS: CPT

## 2023-05-25 NOTE — FLOWSHEET NOTE
independent with written home exercise program  [] Progressing: [] Met: [] Not Met: [] Adjusted  5. Patient able to sleep thru night without waking due to shoulder pain. [] Progressing: [] Met: [] Not Met: [] Adjusted       Treatment/Activity Tolerance:  [x] Patient tolerated treatment well [] Patient limited by fatique  [] Patient limited by pain  [] Patient limited by other medical complications  [] Other:   5/5: No c/o pain with exercises, but did feel weakness  5/22: More soreness R lateral shoulder than in the L shoulder or anywhere else    Overall Progression Towards Functional goals/ Treatment Progress Update:  [x] Patient is progressing as expected towards functional goals listed. [] Progression is slowed due to complexities/Impairments listed. [] Progression has been slowed due to co-morbidities. [] Plan just implemented, too soon to assess goals progression <30days   [] Goals require adjustment due to lack of progress  [] Patient is not progressing as expected and requires additional follow up with physician  [] Other    Prognosis for POC: [x] Good [] Fair  [] Poor    Patient requires continued skilled intervention: [x] Yes  [] No        PLAN: Monitor response. Initiate bicep curls in clinic. [] Continue per plan of care [] Alter current plan (see comments)  [x] Plan of care initiated [] Hold pending MD visit [] Discharge    Therapeutic Exercise and NMR EXR  [] (24088) Provided verbal/tactile cueing for activities related to strengthening, flexibility, endurance, ROM for improvements in LE, proximal hip, and core control with self care, mobility, lifting, ambulation. [x] (93928) Provided verbal/tactile cueing for activities related to improving balance, coordination, kinesthetic sense, posture, motor skill, proprioception  to assist with LE, proximal hip, and core control in self care, mobility, lifting, ambulation and eccentric single leg control.      NMR and Therapeutic Activities:    [x] (69950

## 2023-05-31 ENCOUNTER — APPOINTMENT (OUTPATIENT)
Dept: PHYSICAL THERAPY | Age: 73
End: 2023-05-31
Payer: MEDICARE

## 2023-06-05 ENCOUNTER — HOSPITAL ENCOUNTER (OUTPATIENT)
Dept: PHYSICAL THERAPY | Age: 73
Setting detail: THERAPIES SERIES
Discharge: HOME | End: 2023-06-05
Payer: MEDICARE

## 2023-06-05 PROCEDURE — 97140 MANUAL THERAPY 1/> REGIONS: CPT

## 2023-06-05 PROCEDURE — 97110 THERAPEUTIC EXERCISES: CPT

## 2023-06-05 NOTE — FLOWSHEET NOTE
Other Therapeutic Activities: Pt was educated on PT POC, Diagnosis, Prognosis, pathomechanics as well as frequency and duration of scheduling future physical therapy appointments. Time was also taken on this day to answer all patient questions and participation in PT. Reviewed appointment policy in detail with patient and patient verbalized understanding. Discussed at length anatomy and biomechanics of the shoulder, muscle reeducation, motor recruitment. Home Exercise Program: Patient instructed in  lower trap sets, wall slide with step; written instructions with pictures issued, patient able to demonstrate exercises. 5/18/23:   Patient instructed in doorway stretch; written instructions with pictures issued, patient able to demonstrate exercises. 5/22: Issued green band with band exercises for HEP    ASSESSMENT:  Patient reports decreased functional mobility consistent with B adhesive capsulitis/shoulder pain . Patient would benefit from PT to increase functional mobility. GOALS:  Patient stated goal: \"Increase overhead mobility\"  [] Progressing: [] Met: [] Not Met: [] Adjusted     Therapist goals for Patient:   Short Term Goals: To be achieved in: 2-4 weeks  1. Patient reports pain </= 2/10 at rest, 4/10 with activity  [] Progressing: [] Met: [] Not Met: [] Adjusted  2. Patient will demonstrate PROM WFL within protocol limits  [] Progressing: [] Met: [] Not Met: [] Adjusted     Long Term Goals: To be achieved in at discharge:  1. Disability index score of  36% or less for the UEFIto assist with reaching prior level of function. [] Progressing: [] Met: [] Not Met: [] Adjusted  2. Patient will demonstrate increased AROM to WNL to performs ADLs independently (dressing, grooming, reaching for back pocket)  [] Progressing: [] Met: [] Not Met: [] Adjusted  3.  Patient will demonstrate an increase in NM recruitment/activation and overall GH and scapular strength to within WNL for proper functional

## 2023-06-08 ENCOUNTER — HOSPITAL ENCOUNTER (OUTPATIENT)
Dept: PHYSICAL THERAPY | Age: 73
Setting detail: THERAPIES SERIES
Discharge: HOME | End: 2023-06-08
Payer: MEDICARE

## 2023-06-08 PROCEDURE — 97140 MANUAL THERAPY 1/> REGIONS: CPT

## 2023-06-08 PROCEDURE — 97110 THERAPEUTIC EXERCISES: CPT

## 2023-06-08 NOTE — FLOWSHEET NOTE
[facility] - [facility] Therapy, [address] [place] [address]  Phone: [phone]   Fax:     [phone]      Physical Therapy Treatment Note/ Progress Report:     Date:  2023    Patient Name:  Edith Smith    :  1950  MRN: [id number]    Medical/Treatment Diagnosis Information:  Diagnosis: M25.511, G89.s9, M25.512 Chronic Pain of B Shoulders  Treatment Diagnosis: M25.511 R Shoulder Pain, M25.512 L Shoulder Pain    Insurance/Certification information:  PT Insurance Information: Medicare  Physician Information:  Ally Goel MD  Plan of care signed (Y/N): cosign requested    Date of Patient follow up with Physician:      Progress Report: []  Yes  [x]  No     Date Range for reporting period:  Beginnin23  Ending:      Progress report due (10 Rx/or 30 days whichever is less):     Recertification due (POC duration/ or 90 days whichever is less):      Visit # POC/Insurance Allowable Auth Needed   /BMN []Yes   [x]No     Latex Allergy:  [x]NO      []YES  Preferred Language for Healthcare:   [x]English       []other:    History of Injury: Patient reports a history of B shoulder pain/limited in mobility, has difficulty with exercising at the gym, dressing, reaching. \"Pain is minimal, mobility is the bigger issue. \"  Patient did take a tumble in the yard a few years ago, no major issue, did not get it checked out. Patient does a lot of yard work, goes to [employer] 3x/week, arms fatigue with using chain using. \"Both arms are weak\". Patient rolls from side to side. If hold milk out, will notice discomfort when pouring.        Relevant Medical History: Asthma (seasonal), Diverticulitis, Irritable Bowel Syndrome; Vertigo (did undergo PT, has HEP), hernia   Functional Scale/Score:Upper Extremity Functional Index 59 = 73.75% dysfunction (23)  Pain Scale:3 /10    SUBJECTIVE:  See eval  23:  Patient

## 2023-06-20 ENCOUNTER — HOSPITAL ENCOUNTER (OUTPATIENT)
Dept: PHYSICAL THERAPY | Age: 73
Setting detail: THERAPIES SERIES
Discharge: HOME | End: 2023-06-20
Payer: MEDICARE

## 2023-06-20 PROCEDURE — 97110 THERAPEUTIC EXERCISES: CPT

## 2023-06-20 PROCEDURE — 97140 MANUAL THERAPY 1/> REGIONS: CPT

## 2023-06-20 NOTE — FLOWSHEET NOTE
[facility] - Outpatient Rehabilitation and Therapy, [address] [place] [address]  Phone: [phone]   Fax:     [phone]    Physical Therapy Treatment Note/ Progress Report:     Date:  2023    Patient Name:  Nichol Marks    :  1950  MRN: [id number]    Medical/Treatment Diagnosis Information:  Diagnosis: M25.511, G89.s9, M25.512 Chronic Pain of B Shoulders  Treatment Diagnosis: M25.511 R Shoulder Pain, M25.512 L Shoulder Pain    Insurance/Certification information:  PT Insurance Information: Medicare  Physician Information:  Gala Zaman MD  Plan of care signed (Y/N): cosign received    Date of Patient follow up with Physician:      Progress Report: []  Yes  [x]  No     Date Range for reporting period:  Beginnin23  Ending:      Progress report due (10 Rx/or 30 days whichever is less):     Recertification due (POC duration/ or 90 days whichever is less):      Visit # POC/Insurance Allowable Auth Needed    24/BMN []Yes   [x]No     Latex Allergy:  [x]NO      []YES  Preferred Language for Healthcare:   [x]English       []other:    History of Injury: Patient reports a history of B shoulder pain/limited in mobility, has difficulty with exercising at the gym, dressing, reaching. \"Pain is minimal, mobility is the bigger issue. \"  Patient did take a tumble in the yard a few years ago, no major issue, did not get it checked out. Patient does a lot of yard work, goes to [employer] 3x/week, arms fatigue with using chain using. \"Both arms are weak\". Patient rolls from side to side. If hold milk out, will notice discomfort when pouring.        Relevant Medical History: Asthma (seasonal), Diverticulitis, Irritable Bowel Syndrome; Vertigo (did undergo PT, has HEP), hernia   Functional Scale/Score:Upper Extremity Functional Index 59 = 73.75% dysfunction (23)  Pain Scale:3 /10    SUBJECTIVE:  See eval  23:  Patient

## 2023-06-28 ENCOUNTER — HOSPITAL ENCOUNTER (OUTPATIENT)
Dept: PHYSICAL THERAPY | Age: 73
Setting detail: THERAPIES SERIES
Discharge: HOME | End: 2023-06-28
Payer: MEDICARE

## 2023-06-28 PROCEDURE — 97140 MANUAL THERAPY 1/> REGIONS: CPT

## 2023-06-28 PROCEDURE — 97110 THERAPEUTIC EXERCISES: CPT

## 2023-07-06 ENCOUNTER — HOSPITAL ENCOUNTER (OUTPATIENT)
Dept: PHYSICAL THERAPY | Age: 73
Setting detail: THERAPIES SERIES
Discharge: HOME | End: 2023-07-06
Payer: MEDICARE

## 2023-07-06 ENCOUNTER — APPOINTMENT (OUTPATIENT)
Dept: PHYSICAL THERAPY | Age: 73
End: 2023-07-06
Payer: MEDICARE

## 2023-07-06 PROCEDURE — 97140 MANUAL THERAPY 1/> REGIONS: CPT

## 2023-07-06 PROCEDURE — 97110 THERAPEUTIC EXERCISES: CPT

## 2023-07-06 NOTE — FLOWSHEET NOTE
[facility] - Outpatient Rehabilitation and Therapy, [address] [place] [address]  Phone: [phone]   Fax:     [phone]    Physical Therapy Treatment Note/ Progress Report:     Date:  2023    Patient Name:  Alvarez Carlson    :  1950  MRN: [id number]    Medical/Treatment Diagnosis Information:  Diagnosis: M25.511, G89.s9, M25.512 Chronic Pain of B Shoulders  Treatment Diagnosis: M25.511 R Shoulder Pain, M25.512 L Shoulder Pain    Insurance/Certification information:  PT Insurance Information: Medicare  Physician Information:  Nicholas Bourne MD  Plan of care signed (Y/N): cosign received    Date of Patient follow up with Physician:      Progress Report: []  Yes  [x]  No     Date Range for reporting period:  Beginnin23  Ending:      Progress report due (10 Rx/or 30 days whichever is less):     Recertification due (POC duration/ or 90 days whichever is less):      Visit # POC/Insurance Allowable Auth Needed   /BMN []Yes   [x]No     Latex Allergy:  [x]NO      []YES  Preferred Language for Healthcare:   [x]English       []other:    History of Injury: Patient reports a history of B shoulder pain/limited in mobility, has difficulty with exercising at the gym, dressing, reaching. \"Pain is minimal, mobility is the bigger issue. \"  Patient did take a tumble in the yard a few years ago, no major issue, did not get it checked out. Patient does a lot of yard work, goes to [employer] 3x/week, arms fatigue with using chain using. \"Both arms are weak\". Patient rolls from side to side. If hold milk out, will notice discomfort when pouring.        Relevant Medical History: Asthma (seasonal), Diverticulitis, Irritable Bowel Syndrome; Vertigo (did undergo PT, has HEP), hernia   Functional Scale/Score:Upper Extremity Functional Index 59 = 73.75% dysfunction (23)  Pain Scale:3 /10    SUBJECTIVE:  See eval  23:  Patient

## 2023-07-10 ENCOUNTER — HOSPITAL ENCOUNTER (OUTPATIENT)
Dept: PHYSICAL THERAPY | Age: 73
Setting detail: THERAPIES SERIES
Discharge: HOME | End: 2023-07-10
Payer: MEDICARE

## 2023-07-10 PROCEDURE — 97140 MANUAL THERAPY 1/> REGIONS: CPT

## 2023-07-10 PROCEDURE — 97110 THERAPEUTIC EXERCISES: CPT

## 2023-07-10 NOTE — FLOWSHEET NOTE
reports shoulder is doing better overall  5/5: Has been doing okay with therapy. Neither shoulder is better than the other. They both hurt  5/16/23:  patient reports shoulders did okay on trip to [facility]. Patient did most of the driving, R arm got sore, L did okay. 5/18/23:  Patient reports shoulders are doing better overall, some soreness but as expected. 5/22:  Doing okay. Both shoulders are about the same. Just falling apart due to old age. They have a little more mobility, strength is slower  5/25/23:  Patient reports shoulder is feeling better, is moving easier  6/5/23:  Patient reports shoulders are doing better, however R medial elbow has been sore. 6/8: Right lateral upper arm soreness dependent upon what he is doing.   6//13/23:  Patient reports shoulders are doing better, R elbow has continued to be sore  6/15/23:  Patient reports soreness after last visit, R elbow is doing better  6/20/23:  Patient reports doing better overall, continues to have difficulty reaching for plates in wall cabinet with R UE  6/28/23:  Patient reports he can tell he's improving, but continues to have some R elbow pain and limited with B shoulders.   7/6/23:  Patient reports doing better overall, continues with some soreness R elbow and difficulty reaching up  7/10/23:  Patient reports doing okay, elbow seems to be staying about the same    OBJECTIVE:  Observation: min/mod tightness R>L rotator cuff muscle bellies; tenderness noted med epidondyle R elbow, forearm supinator muscle belly  Test measurements:                                                              4/25/23                                       6/15/23                  ROM Left Right     Shoulder Flex 135, poor+ scapular mobility 110 hikes shoulder, poor scapular mobility 140 110   Shoulder Ext 55 60 60 70   Shoulder Abd 85, poor+ scapular mobility 80 hikes shoulder, poor scapular mobility 125 85 hikes shoulder   Shoulder ER 40 40 65 70   Shoulder IR 50 45

## 2023-07-12 ENCOUNTER — HOSPITAL ENCOUNTER (OUTPATIENT)
Dept: PHYSICAL THERAPY | Age: 73
Setting detail: THERAPIES SERIES
Discharge: HOME | End: 2023-07-12
Payer: MEDICARE

## 2023-07-12 PROCEDURE — 97110 THERAPEUTIC EXERCISES: CPT

## 2023-07-12 PROCEDURE — 97140 MANUAL THERAPY 1/> REGIONS: CPT

## 2023-07-12 NOTE — FLOWSHEET NOTE
[facility] - Outpatient Rehabilitation and Therapy, [address] [facility] [address]  Phone: [phone]   Fax:     [phone]    Physical Therapy Treatment Note/ Progress Report:     Date:  2023    Patient Name:  Hever Ny    :  1950  MRN: [id number]    Medical/Treatment Diagnosis Information:  Diagnosis: M25.511, G89.s9, M25.512 Chronic Pain of B Shoulders  Treatment Diagnosis: M25.511 R Shoulder Pain, M25.512 L Shoulder Pain    Insurance/Certification information:  PT Insurance Information: Medicare  Physician Information:  Christine Kocher, MD  Plan of care signed (Y/N): cosign received    Date of Patient follow up with Physician:      Progress Report: []  Yes  [x]  No     Date Range for reporting period:  Beginnin23  Ending:      Progress report due (10 Rx/or 30 days whichever is less):     Recertification due (POC duration/ or 90 days whichever is less):      Visit # POC/Insurance Allowable Auth Needed    24/BMN []Yes   [x]No     Latex Allergy:  [x]NO      []YES  Preferred Language for Healthcare:   [x]English       []other:    History of Injury: Patient reports a history of B shoulder pain/limited in mobility, has difficulty with exercising at the gym, dressing, reaching. \"Pain is minimal, mobility is the bigger issue. \"  Patient did take a tumble in the yard a few years ago, no major issue, did not get it checked out. Patient does a lot of yard work, goes to [employer] 3x/week, arms fatigue with using chain using. \"Both arms are weak\". Patient rolls from side to side. If hold milk out, will notice discomfort when pouring.        Relevant Medical History: Asthma (seasonal), Diverticulitis, Irritable Bowel Syndrome; Vertigo (did undergo PT, has HEP), hernia   Functional Scale/Score:Upper Extremity Functional Index 59 = 73.75% dysfunction (23)  Pain Scale:3 /10    SUBJECTIVE:  See eval  23:  Patient

## 2023-07-17 ENCOUNTER — HOSPITAL ENCOUNTER (OUTPATIENT)
Dept: PHYSICAL THERAPY | Age: 73
Setting detail: THERAPIES SERIES
Discharge: HOME | End: 2023-07-17
Payer: MEDICARE

## 2023-07-17 PROCEDURE — 97140 MANUAL THERAPY 1/> REGIONS: CPT

## 2023-07-17 PROCEDURE — 97110 THERAPEUTIC EXERCISES: CPT

## 2023-07-17 NOTE — FLOWSHEET NOTE
[facility] - Outpatient Rehabilitation and Therapy, [address] [address]  Phone: [phone]   Fax:     [phone]    Physical Therapy Treatment Note/ Progress Report:     Date:  2023    Patient Name:  Jami Stanton    :  1950  MRN: [id number]    Medical/Treatment Diagnosis Information:  Diagnosis: M25.511, G89.s9, M25.512 Chronic Pain of B Shoulders  Treatment Diagnosis: M25.511 R Shoulder Pain, M25.512 L Shoulder Pain    Insurance/Certification information:  PT Insurance Information: Medicare  Physician Information:  Ashleigh Delarosa MD  Plan of care signed (Y/N): cosign received    Date of Patient follow up with Physician:      Progress Report: []  Yes  [x]  No     Date Range for reporting period:  Beginnin23  Ending:      Progress report due (10 Rx/or 30 days whichever is less):     Recertification due (POC duration/ or 90 days whichever is less):      Visit # POC/Insurance Allowable Auth Needed   /BMN []Yes   [x]No     Latex Allergy:  [x]NO      []YES  Preferred Language for Healthcare:   [x]English       []other:    History of Injury: Patient reports a history of B shoulder pain/limited in mobility, has difficulty with exercising at the gym, dressing, reaching. \"Pain is minimal, mobility is the bigger issue. \"  Patient did take a tumble in the yard a few years ago, no major issue, did not get it checked out. Patient does a lot of yard work, goes to [employer] 3x/week, arms fatigue with using chain using. \"Both arms are weak\". Patient rolls from side to side. If hold milk out, will notice discomfort when pouring.        Relevant Medical History: Asthma (seasonal), Diverticulitis, Irritable Bowel Syndrome; Vertigo (did undergo PT, has HEP), hernia   Functional Scale/Score:Upper Extremity Functional Index 59 = 73.75% dysfunction (23)  Pain Scale:3 /10    SUBJECTIVE:  See eval  23:  Patient

## 2023-07-19 ENCOUNTER — HOSPITAL ENCOUNTER (OUTPATIENT)
Dept: PHYSICAL THERAPY | Age: 73
Setting detail: THERAPIES SERIES
Discharge: HOME | End: 2023-07-19
Payer: MEDICARE

## 2023-07-19 PROCEDURE — 97140 MANUAL THERAPY 1/> REGIONS: CPT

## 2023-07-19 PROCEDURE — 97110 THERAPEUTIC EXERCISES: CPT

## 2023-07-19 NOTE — FLOWSHEET NOTE
Dry needle 3+ Muscles (16346)  [x] Manual (32327) x  2   [] Ultrasound (34402) x  [] TA (51952) x     [] Mech Traction (44635)  [] ES(attended) (59072)     [] ES (un) (33328):   [] Vasopump (43425) [] Ionto (71924)   [] Other:      Electronically signed by: Gold Mendoza, PT [id number]  Note: If patient does not return for scheduled/recommended follow up visits, this note will serve as a discharge from care along with the most recent update on progress.

## 2023-07-24 ENCOUNTER — HOSPITAL ENCOUNTER (OUTPATIENT)
Dept: PHYSICAL THERAPY | Age: 73
Setting detail: THERAPIES SERIES
Discharge: HOME | End: 2023-07-24
Payer: MEDICARE

## 2023-07-24 PROCEDURE — 97140 MANUAL THERAPY 1/> REGIONS: CPT

## 2023-07-24 PROCEDURE — 97110 THERAPEUTIC EXERCISES: CPT

## 2023-07-24 NOTE — FLOWSHEET NOTE
[facility] - Outpatient Rehabilitation and Therapy, [address] [place] [address]  Phone: [phone]   Fax:     [phone]    Physical Therapy Treatment Note/ Progress Report:     Date:  2023    Patient Name:  Trung Chaney    :  1950  MRN: [id number]    Medical/Treatment Diagnosis Information:  Diagnosis: M25.511, G89.s9, M25.512 Chronic Pain of B Shoulders  Treatment Diagnosis: M25.511 R Shoulder Pain, M25.512 L Shoulder Pain    Insurance/Certification information:  PT Insurance Information: Medicare  Physician Information:  Rodrick Mays MD  Plan of care signed (Y/N): cosign received    Date of Patient follow up with Physician:      Progress Report: []  Yes  [x]  No     Date Range for reporting period:  Beginnin23  Ending:      Progress report due (10 Rx/or 30 days whichever is less):     Recertification due (POC duration/ or 90 days whichever is less):      Visit # POC/Insurance Allowable Auth Needed   /BMN []Yes   [x]No     Latex Allergy:  [x]NO      []YES  Preferred Language for Healthcare:   [x]English       []other:    History of Injury: Patient reports a history of B shoulder pain/limited in mobility, has difficulty with exercising at the gym, dressing, reaching. \"Pain is minimal, mobility is the bigger issue. \"  Patient did take a tumble in the yard a few years ago, no major issue, did not get it checked out. Patient does a lot of yard work, goes to [employer] 3x/week, arms fatigue with using chain using. \"Both arms are weak\". Patient rolls from side to side. If hold milk out, will notice discomfort when pouring.        Relevant Medical History: Asthma (seasonal), Diverticulitis, Irritable Bowel Syndrome; Vertigo (did undergo PT, has HEP), hernia   Functional Scale/Score:Upper Extremity Functional Index 59 = 73.75% dysfunction (23)  Pain Scale:3 /10    SUBJECTIVE:  See eval  23:  Patient

## 2023-07-26 ENCOUNTER — HOSPITAL ENCOUNTER (OUTPATIENT)
Dept: PHYSICAL THERAPY | Age: 73
Setting detail: THERAPIES SERIES
Discharge: HOME | End: 2023-07-26
Payer: MEDICARE

## 2023-07-26 PROCEDURE — 97140 MANUAL THERAPY 1/> REGIONS: CPT

## 2023-07-26 PROCEDURE — 97110 THERAPEUTIC EXERCISES: CPT

## 2023-07-26 NOTE — FLOWSHEET NOTE
[facility] - Outpatient Rehabilitation and Therapy, [address] [address]  Phone: [phone]   Fax:     [phone]    Physical Therapy Treatment Note/ Progress Report:     Date:  2023    Patient Name:  Benoit Paulino    :  1950  MRN: [id number]    Medical/Treatment Diagnosis Information:  Diagnosis: M25.511, G89.s9, M25.512 Chronic Pain of B Shoulders  Treatment Diagnosis: M25.511 R Shoulder Pain, M25.512 L Shoulder Pain    Insurance/Certification information:  PT Insurance Information: Medicare  Physician Information:  Zhane Michael MD  Plan of care signed (Y/N): cosign received    Date of Patient follow up with Physician:      Progress Report: []  Yes  [x]  No     Date Range for reporting period:  Beginnin23  Ending:      Progress report due (10 Rx/or 30 days whichever is less):     Recertification due (POC duration/ or 90 days whichever is less):      Visit # POC/Insurance Allowable Auth Needed   /BMN []Yes   [x]No     Latex Allergy:  [x]NO      []YES  Preferred Language for Healthcare:   [x]English       []other:    History of Injury: Patient reports a history of B shoulder pain/limited in mobility, has difficulty with exercising at the gym, dressing, reaching. \"Pain is minimal, mobility is the bigger issue. \"  Patient did take a tumble in the yard a few years ago, no major issue, did not get it checked out. Patient does a lot of yard work, goes to [employer] 3x/week, arms fatigue with using chain using. \"Both arms are weak\". Patient rolls from side to side. If hold milk out, will notice discomfort when pouring.        Relevant Medical History: Asthma (seasonal), Diverticulitis, Irritable Bowel Syndrome; Vertigo (did undergo PT, has HEP), hernia   Functional Scale/Score:Upper Extremity Functional Index 59 = 73.75% dysfunction (23)  Pain Scale:3 /10    SUBJECTIVE:  See eval  23:  Patient

## 2023-07-31 ENCOUNTER — HOSPITAL ENCOUNTER (OUTPATIENT)
Dept: PHYSICAL THERAPY | Age: 73
Setting detail: THERAPIES SERIES
Discharge: HOME | End: 2023-07-31
Payer: MEDICARE

## 2023-07-31 PROCEDURE — 97140 MANUAL THERAPY 1/> REGIONS: CPT

## 2023-07-31 PROCEDURE — 97110 THERAPEUTIC EXERCISES: CPT

## 2023-07-31 NOTE — PLAN OF CARE
[address] [facility] and Therapy, [address] [place] [address]. [place] [address]  Phone: [phone]   Fax:     [phone]       Physical Therapy Discharge Summary    Dear Dr. Aurelio Lee,    We had the pleasure of treating the following patient for physical therapy services at [address] [place] [address]. A summary of our findings can be found in the discharge summary below. If you have any questions or concerns regarding these findings, please do not hesitate to contact me at the office phone number above.   Thank you for the referral.     Overall Response to Treatment:   [x]Patient is responding well to treatment and improvement is noted with regards  to goals   [x]Patient should continue to improve in reasonable time if they continue HEP   []Patient has plateaued and is no longer responding to skilled PT intervention    []Patient is getting worse and would benefit from return to referring MD   []Patient unable to adhere to initial POC   []Other:     Date range of Visits: 23 thru 23  Total Visits: 21     Physical Therapy Treatment Note/ Progress Report:     Date:  2023    Patient Name:  Benoit Paulino    :  1950  MRN: [id number]    Medical/Treatment Diagnosis Information:  Diagnosis: M25.511, G89.s9, M25.512 Chronic Pain of B Shoulders  Treatment Diagnosis: M25.511 R Shoulder Pain, M25.512 L Shoulder Pain    Insurance/Certification information:  PT Insurance Information: Medicare  Physician Information:  Zhane Michael MD  Plan of care signed (Y/N): cosign received, recert received    Date of Patient follow up with Physician:      Progress Report: []  Yes  [x]  No     Date Range for reporting period:  Beginnin23  Endin23      Progress report due (10 Rx/or 30 days whichever is less):     Recertification due (POC duration/ or 90 days whichever is less):      Visit # POC/Insurance Allowable Auth

## 2023-08-10 ENCOUNTER — OFFICE VISIT (OUTPATIENT)
Dept: FAMILY MEDICINE CLINIC | Age: 73
End: 2023-08-10

## 2023-08-10 VITALS
TEMPERATURE: 97.9 F | WEIGHT: 217 LBS | DIASTOLIC BLOOD PRESSURE: 82 MMHG | HEIGHT: 70 IN | HEART RATE: 72 BPM | SYSTOLIC BLOOD PRESSURE: 128 MMHG | BODY MASS INDEX: 31.07 KG/M2

## 2023-08-10 DIAGNOSIS — R26.89 BALANCE PROBLEM: Primary | ICD-10-CM

## 2023-08-10 ASSESSMENT — PATIENT HEALTH QUESTIONNAIRE - PHQ9
SUM OF ALL RESPONSES TO PHQ QUESTIONS 1-9: 0
SUM OF ALL RESPONSES TO PHQ QUESTIONS 1-9: 0
2. FEELING DOWN, DEPRESSED OR HOPELESS: 0
1. LITTLE INTEREST OR PLEASURE IN DOING THINGS: 0
SUM OF ALL RESPONSES TO PHQ QUESTIONS 1-9: 0
SUM OF ALL RESPONSES TO PHQ9 QUESTIONS 1 & 2: 0
SUM OF ALL RESPONSES TO PHQ QUESTIONS 1-9: 0

## 2023-08-10 NOTE — PROGRESS NOTES
Subjective:      Patient ID: Perico Pang is a 68 y.o. male. Chief Complaint   Patient presents with    Dizziness     Feels off balance         Patient presents with:  Dizziness: Feels off balance     Here for the above     If closes eyes feels off balance and if on uneven ground does not feel secure  For a year    His shoulder is much better  No vertigo no headache  No racing of the heart no sob no cp   Vision ok no ear pain  Hearing is down and ringing both sides    Sinus pnd and congestion not new and years wanted to know otc treatments      YOB: 1950    Date of Visit:  8/10/2023     -- Penicillins -- Hives    Current Outpatient Medications:  B Complex Vitamins (VITAMIN B-COMPLEX PO), Take by mouth, Disp: , Rfl:   docusate sodium (COLACE) 100 MG capsule, Take 1 capsule by mouth 2 times daily, Disp: , Rfl:   Probiotic Product (PROBIOTIC PO), Take by mouth, Disp: , Rfl:   metroNIDAZOLE (METROGEL) 0.75 % gel, Apply topically  daily. , Disp: 45 g, Rfl: 0  albuterol sulfate HFA (VENTOLIN HFA) 108 (90 Base) MCG/ACT inhaler, Inhale 2 puffs into the lungs 4 times daily as needed for Wheezing, Disp: 18 g, Rfl: 0  azelastine (ASTELIN) 0.1 % nasal spray, 2 sprays by Nasal route 2 times daily Use in each nostril as directed, Disp: 30 mL, Rfl: 3  Magnesium 100 MG CAPS, Take 400 mg by mouth daily , Disp: , Rfl:   vitamin C (ASCORBIC ACID) 500 MG tablet, Take 2 tablets by mouth daily, Disp: , Rfl:   Multiple Vitamins-Minerals (THERAPEUTIC MULTIVITAMIN-MINERALS) tablet, Take 1 tablet by mouth daily, Disp: , Rfl:   glucosamine-chondroitin 500-400 MG CAPS, Take by mouth daily , Disp: , Rfl:    magnesium citrate solution, Take 296 mLs by mouth once for 1 dose, Disp: 296 mL, Rfl: 0    No current facility-administered medications for this visit.      ---------------------------               08/10/23                      1009         ---------------------------   BP:          128/82         Site:

## 2023-08-10 NOTE — PATIENT INSTRUCTIONS
Consider the RSV vaccine and the shingle vaccine   Use saline nasal spray   Flonase nasal spray   Consider allegra zyrtec or claritin   Call me if not better

## 2023-08-14 DIAGNOSIS — R26.89 BALANCE PROBLEM: ICD-10-CM

## 2023-08-14 LAB
BASOPHILS # BLD: 0 K/UL (ref 0–0.2)
BASOPHILS NFR BLD: 0.6 %
DEPRECATED RDW RBC AUTO: 13 % (ref 12.4–15.4)
EOSINOPHIL # BLD: 0.3 K/UL (ref 0–0.6)
EOSINOPHIL NFR BLD: 4.7 %
FOLATE SERPL-MCNC: >20 NG/ML (ref 4.78–24.2)
HCT VFR BLD AUTO: 46 % (ref 40.5–52.5)
HGB BLD-MCNC: 15.8 G/DL (ref 13.5–17.5)
LYMPHOCYTES # BLD: 1.1 K/UL (ref 1–5.1)
LYMPHOCYTES NFR BLD: 18.4 %
MCH RBC QN AUTO: 32.3 PG (ref 26–34)
MCHC RBC AUTO-ENTMCNC: 34.4 G/DL (ref 31–36)
MCV RBC AUTO: 93.9 FL (ref 80–100)
MONOCYTES # BLD: 0.4 K/UL (ref 0–1.3)
MONOCYTES NFR BLD: 7.2 %
NEUTROPHILS # BLD: 4 K/UL (ref 1.7–7.7)
NEUTROPHILS NFR BLD: 69.1 %
PLATELET # BLD AUTO: 137 K/UL (ref 135–450)
PMV BLD AUTO: 10.8 FL (ref 5–10.5)
RBC # BLD AUTO: 4.9 M/UL (ref 4.2–5.9)
VIT B12 SERPL-MCNC: 593 PG/ML (ref 211–911)
WBC # BLD AUTO: 5.7 K/UL (ref 4–11)

## 2023-08-22 ENCOUNTER — HOSPITAL ENCOUNTER (OUTPATIENT)
Dept: CT IMAGING | Age: 73
Discharge: HOME | End: 2023-08-22
Attending: FAMILY MEDICINE
Payer: MEDICARE

## 2023-08-22 ENCOUNTER — HOSPITAL ENCOUNTER (OUTPATIENT)
Dept: PHYSICAL THERAPY | Age: 73
Setting detail: THERAPIES SERIES
Discharge: HOME | End: 2023-08-22
Payer: MEDICARE

## 2023-08-22 DIAGNOSIS — R26.89 BALANCE PROBLEM: ICD-10-CM

## 2023-08-22 PROCEDURE — 97161 PT EVAL LOW COMPLEX 20 MIN: CPT

## 2023-08-22 PROCEDURE — 70450 CT HEAD/BRAIN W/O DYE: CPT

## 2023-08-22 PROCEDURE — 97530 THERAPEUTIC ACTIVITIES: CPT

## 2023-08-22 PROCEDURE — 97112 NEUROMUSCULAR REEDUCATION: CPT

## 2023-08-22 NOTE — FLOWSHEET NOTE
Required  Timed Code Treatment Minutes: 20   Total Treatment Minutes: 45      [x] EVAL (LOW) 78590 (typically 20 minutes face-to-face)  [] EVAL (MOD) 85990 (typically 30 minutes face-to-face)  [] EVAL (HIGH) 42833 (typically 45 minutes face-to-face)  [] RE-EVAL     [] FJ(79844) x     [] Dry needle 1 or 2 Muscles (16152)  [x] NMR (74582) x     [] Dry needle 3+ Muscles (99985)  [] Manual (28467) x     [] Ultrasound (84116) x  [x] TA (28785) x     [] Mech Traction (47905)  [] ES(attended) (72890)     [] ES (un) (53496):   [] Vasopump (50365) [] Ionto (95004)   [] Other:      Electronically signed by: Igor Stanley, PT [id number]    Note: If patient does not return for scheduled/recommended follow up visits, this note will serve as a discharge from care along with the most recent update on progress.

## 2023-08-22 NOTE — PLAN OF CARE
B LEs     []Signs/symptoms consistent with tendinitis/tendinosis    []signs/symptoms consistent with pathology which may benefit from Dry needling      []other:      Prognosis/Rehab Potential:      []Excellent   [x]Good    []Fair   []Poor    Tolerance of evaluation/treatment:    []Excellent   [x]Good    []Fair   []Poor    Physical Therapy Evaluation Complexity Justification  [x] A history of present problem with:  [] no personal factors and/or comorbidities that impact the plan of care;  [x]1-2 personal factors and/or comorbidities that impact the plan of care  []3 personal factors and/or comorbidities that impact the plan of care  [x] An examination of body systems using standardized tests and measures addressing any of the following: body structures and functions (impairments), activity limitations, and/or participation restrictions;:  [] a total of 1-2 or more elements   [x] a total of 3 or more elements   [] a total of 4 or more elements   [x] A clinical presentation with:  [x] stable and/or uncomplicated characteristics   [] evolving clinical presentation with changing characteristics  [] unstable and unpredictable characteristics;   [x] Clinical decision making of [x] low, [] moderate, [] high complexity using standardized patient assessment instrument and/or measurable assessment of functional outcome. [x] EVAL (LOW) 19506 (typically 20 minutes face-to-face)  [] EVAL (MOD) 98499 (typically 30 minutes face-to-face)  [] EVAL (HIGH) 48774 (typically 45 minutes face-to-face)  [] RE-EVAL     GOALS:  Patient stated goal: \"Restore Balance\"  [] Progressing: [] Met: [] Not Met: [] Adjusted    Therapist goals for Patient:   Short Term Goals: To be achieved in: 2 - 4 weeks  1. Patient will report no LOB episodes in 1 week time frame  [] Progressing: [] Met: [] Not Met: [] Adjusted  2. Patient will demonstrate static standing balance >/= Fair  [] Progressing: [] Met: [] Not Met: [] Adjusted    Long Term Goals:  To be

## 2023-08-28 ENCOUNTER — HOSPITAL ENCOUNTER (OUTPATIENT)
Dept: PHYSICAL THERAPY | Age: 73
Setting detail: THERAPIES SERIES
Discharge: HOME | End: 2023-08-28
Payer: MEDICARE

## 2023-08-28 PROCEDURE — 97110 THERAPEUTIC EXERCISES: CPT

## 2023-08-28 PROCEDURE — 97112 NEUROMUSCULAR REEDUCATION: CPT

## 2023-08-28 NOTE — FLOWSHEET NOTE
HEP activities related to improving balance, coordination, kinesthetic sense, posture, motor skill, proprioception of core, proximal hip and LE for self care, mobility, lifting, and ambulation/stair navigation      Manual Treatments:  PROM / STM / Oscillations-Mobs:  G-I, II, III, IV (PA's, Inf., Post.)  [] (73767) Provided manual therapy to mobilize LE, proximal hip and/or LS spine soft tissue/joints for the purpose of modulating pain, promoting relaxation,  increasing ROM, reducing/eliminating soft tissue swelling/inflammation/restriction, improving soft tissue extensibility and allowing for proper ROM for normal function with self care, mobility, lifting and ambulation. \     Charges: Karen Crawford Required  Timed Code Treatment Minutes: 45   Total Treatment Minutes: 45      [] EVAL (LOW) 69048 (typically 20 minutes face-to-face)  [] EVAL (MOD) 08183 (typically 30 minutes face-to-face)  [] EVAL (HIGH) 51376 (typically 45 minutes face-to-face)  [] RE-EVAL     [x] AX(50358) x     [] Dry needle 1 or 2 Muscles (16511)  [x] NMR (23412) x  2   [] Dry needle 3+ Muscles (44830)  [] Manual (76741) x     [] Ultrasound (62241) x  [] TA (04493) x     [] Mech Traction (82226)  [] ES(attended) (82531)     [] ES (un) (90354):   [] Vasopump (37123) [] Ionto (06980)   [] Other:      Electronically signed by: Divya Mosqueda, PT 0110    Note: If patient does not return for scheduled/recommended follow up visits, this note will serve as a discharge from care along with the most recent update on progress.

## 2023-08-30 ENCOUNTER — HOSPITAL ENCOUNTER (OUTPATIENT)
Dept: PHYSICAL THERAPY | Age: 73
Setting detail: THERAPIES SERIES
Discharge: HOME | End: 2023-08-30
Payer: MEDICARE

## 2023-08-30 PROCEDURE — 97110 THERAPEUTIC EXERCISES: CPT

## 2023-08-30 PROCEDURE — 97112 NEUROMUSCULAR REEDUCATION: CPT

## 2023-08-30 NOTE — FLOWSHEET NOTE
recruitment and positioning and eccentric body weight control with ambulation re-education including up and down stairs     Home Exercise Program:    [] (08181) Reviewed/Progressed HEP activities related to strengthening, flexibility, endurance, ROM of core, proximal hip and LE for functional self-care, mobility, lifting and ambulation/stair navigation   [x] (09096)Reviewed/Progressed HEP activities related to improving balance, coordination, kinesthetic sense, posture, motor skill, proprioception of core, proximal hip and LE for self care, mobility, lifting, and ambulation/stair navigation      Manual Treatments:  PROM / STM / Oscillations-Mobs:  G-I, II, III, IV (PA's, Inf., Post.)  [] (92582) Provided manual therapy to mobilize LE, proximal hip and/or LS spine soft tissue/joints for the purpose of modulating pain, promoting relaxation,  increasing ROM, reducing/eliminating soft tissue swelling/inflammation/restriction, improving soft tissue extensibility and allowing for proper ROM for normal function with self care, mobility, lifting and ambulation. \     Charges: Solis Briggs Required  Timed Code Treatment Minutes: 45   Total Treatment Minutes: 45      [] EVAL (LOW) 30226 (typically 20 minutes face-to-face)  [] EVAL (MOD) 53644 (typically 30 minutes face-to-face)  [] EVAL (HIGH) 75547 (typically 45 minutes face-to-face)  [] RE-EVAL     [x] XL(45170) x     [] Dry needle 1 or 2 Muscles (91961)  [x] NMR (12558) x  2   [] Dry needle 3+ Muscles (67264)  [] Manual (63927) x     [] Ultrasound (08309) x  [] TA (07237) x     [] Mech Traction (96519)  [] ES(attended) (42194)     [] ES (un) (15957):   [] Vasopump (09505) [] Ionto (17422)   [] Other:      Electronically signed by: Sanchez Mac, PT 7849    Note: If patient does not return for scheduled/recommended follow up visits, this note will serve as a discharge from care along with the most recent update on progress.

## 2023-09-05 ENCOUNTER — HOSPITAL ENCOUNTER (OUTPATIENT)
Dept: PHYSICAL THERAPY | Age: 73
Setting detail: THERAPIES SERIES
Discharge: HOME | End: 2023-09-05
Payer: MEDICARE

## 2023-09-05 PROCEDURE — 97110 THERAPEUTIC EXERCISES: CPT

## 2023-09-05 PROCEDURE — 97112 NEUROMUSCULAR REEDUCATION: CPT

## 2023-09-05 NOTE — FLOWSHEET NOTE
[address] [facility], [address] [facility] [address] [place] [address]  Phone: [phone]   Fax:     [phone]      Physical Therapy Treatment Note/ Progress Report:     Date:  2023    Patient Name:  Mateo Cardozo    :  1950  MRN: [id number]      Medical/Treatment Diagnosis Information:  Diagnosis: R26.89 Balance Problem  Treatment Diagnosis: R26.89 Balance Disorder    Insurance/Certification information:  PT Insurance Information: Medicare  Physician Information:  Radha Garvey MD  Plan of care signed (Y/N): cosign received    Date of Patient follow up with Physician:      Progress Report: []  Yes  [x]  No     Date Range for reporting period:  Beginnin23  Ending:      Progress report due (10 Rx/or 30 days whichever is less):     Recertification due (POC duration/ or 90 days whichever is less):      Visit # POC/Insurance Allowable Auth Needed   4 12/BMN []Yes   [x]No     Latex Allergy:  [x]NO      []YES  Preferred Language for Healthcare:   [x]English       []other:    History of Injury: : Patient reports loss of balance when walking on uneven areas (grass, woods0 \"I don't know where my feet are going . I can be standing there talking to someone and then I can lean. Going down stairs it's the coordination of the eyes and the feet, including at home, has to hold onto the handrails. \"  Patient has not had any falls, has been able to self correct with holding onto something. Patient has grab bars in bathroom.        Relevant Medical History: Asthma (seasonal), Diverticulitis, Irritable Bowel Syndrome; Vertigo (did undergo PT, has HEP), hernia      Functional Scale/Score: WOMAC=9 = 9.3% dysfunction (23)                                             TUG:  10.35 sec (23)     Pain Scale: 0/10    SUBJECTIVE:  See eval  23:  Patient reports doing okay, overall, feels like today is a \"bad day, a

## 2023-09-07 ENCOUNTER — HOSPITAL ENCOUNTER (OUTPATIENT)
Dept: PHYSICAL THERAPY | Age: 73
Setting detail: THERAPIES SERIES
Discharge: HOME | End: 2023-09-07
Payer: MEDICARE

## 2023-09-07 PROCEDURE — 97110 THERAPEUTIC EXERCISES: CPT

## 2023-09-07 PROCEDURE — 97112 NEUROMUSCULAR REEDUCATION: CPT

## 2023-09-07 NOTE — FLOWSHEET NOTE
appointment policy in detail with patient and patient verbalized understanding. Discussed at length anatomy and biomechanics of balance, muscle reeducation, motor recruitment. Home Exercise Program:  Patient instructed in chin tucks, lower trap sets, TA sets gluteal sets, quad sets, isometric hip add; written instructions with pictures issued, patient able to demonstrate exercises. 8/28/23:   Patient instructed in sitting in chair hamstring stretch, longsitting hip IR/ER; written instructions with pictures issued, patient able to demonstrate exercises. 8/30/23:   Patient instructed in VMO sets; written instructions with pictures issued, patient able to demonstrate exercises. 9/5/23:   Patient instructed in bent leg fall out, sidelying clamshell with pillow; written instructions with pictures issued, patient able to demonstrate exercises. ASSESSMENT:  Patient presents with decreased functional mobility consistent with muscle imbalance of the core and B LEs . Patient would benefit from PT to increase functional mobility    GOALS:  Patient stated goal: \"Restore Balance\"  [] Progressing: [] Met: [] Not Met: [] Adjusted     Therapist goals for Patient:   Short Term Goals: To be achieved in: 2 - 4 weeks  1. Patient will report no LOB episodes in 1 week time frame  [] Progressing: [] Met: [] Not Met: [] Adjusted  2. Patient will demonstrate static standing balance >/= Fair  [] Progressing: [] Met: [] Not Met: [] Adjusted     Long Term Goals: To be achieved in: 4-8 weeks  1. Disability index score of   5 or less for the [facility] to assist with reaching prior level of function. [] Progressing: [] Met: [] Not Met: [] Adjusted  2. Patient will demonstrate increased Balance to enable amb with/without AD, gait pattern WNL, >/= 15 minutes  [] Progressing: [] Met: [] Not Met: [] Adjusted  3. Patient will be independent with written home exercise program  [] Progressing: [] Met: [] Not Met: [] Adjusted  4.  Patient

## 2023-09-11 ENCOUNTER — HOSPITAL ENCOUNTER (OUTPATIENT)
Dept: PHYSICAL THERAPY | Age: 73
Setting detail: THERAPIES SERIES
Discharge: HOME | End: 2023-09-11
Payer: MEDICARE

## 2023-09-11 PROCEDURE — 97110 THERAPEUTIC EXERCISES: CPT

## 2023-09-11 PROCEDURE — 97112 NEUROMUSCULAR REEDUCATION: CPT

## 2023-09-11 NOTE — FLOWSHEET NOTE
[address] [facility], [address] [place] [address]. [facility] [address]  Phone: [phone]   Fax:     [phone]      Physical Therapy Treatment Note/ Progress Report:     Date:  2023    Patient Name:  Mya Oseguera    :  1950  MRN: [id number]      Medical/Treatment Diagnosis Information:  Diagnosis: R26.89 Balance Problem  Treatment Diagnosis: R26.89 Balance Disorder    Insurance/Certification information:  PT Insurance Information: Medicare  Physician Information:  Erick Johnson MD  Plan of care signed (Y/N): cosign received    Date of Patient follow up with Physician:      Progress Report: []  Yes  [x]  No     Date Range for reporting period:  Beginnin23  Ending:      Progress report due (10 Rx/or 30 days whichever is less):     Recertification due (POC duration/ or 90 days whichever is less):      Visit # POC/Insurance Allowable Auth Needed   6 12/BMN []Yes   [x]No     Latex Allergy:  [x]NO      []YES  Preferred Language for Healthcare:   [x]English       []other:    History of Injury: : Patient reports loss of balance when walking on uneven areas (grass, woods0 \"I don't know where my feet are going . I can be standing there talking to someone and then I can lean. Going down stairs it's the coordination of the eyes and the feet, including at home, has to hold onto the handrails. \"  Patient has not had any falls, has been able to self correct with holding onto something. Patient has grab bars in bathroom.        Relevant Medical History: Asthma (seasonal), Diverticulitis, Irritable Bowel Syndrome; Vertigo (did undergo PT, has HEP), hernia      Functional Scale/Score: WOMAC=9 = 9.3% dysfunction (23)                                             TUG:  10.35 sec (23)     Pain Scale: 0/10    SUBJECTIVE:  See eval  23:  Patient reports doing okay, overall, feels like today is a \"bad day, a

## 2023-09-14 ENCOUNTER — HOSPITAL ENCOUNTER (OUTPATIENT)
Dept: PHYSICAL THERAPY | Age: 73
Setting detail: THERAPIES SERIES
End: 2023-09-14
Payer: MEDICARE

## 2023-09-14 NOTE — FLOWSHEET NOTE
[address] [facility], [address] [facility].  [place] [address]  Phone: [phone]   Fax:     [phone]     Physical Therapy  Cancellation/No-show Note  Patient Name:  Hever Ny  :  1950   Date:  2023  Cancelled visits to date: 1  No-shows to date: 0    Patient status for today's appointment patient:  [x]  Cancelled  []  Rescheduled appointment  []  No-show     Reason given by patient:  [x]  Patient ill  []  Conflicting appointment  []  No transportation    []  Conflict with work  []  No reason given  [x]  Other:     Comments:  Patient called, cancelled, stated his vertigo was acting up      Phone call information:   []  Phone call made today to patient at _ time at number provided:      []  Patient answered, conversation as follows:    []  Patient did not answer, message left as follows:  []  Phone call not made today    Electronically signed by:  Avtar [place], [address]

## 2023-09-18 ENCOUNTER — HOSPITAL ENCOUNTER (OUTPATIENT)
Dept: PHYSICAL THERAPY | Age: 73
Setting detail: THERAPIES SERIES
Discharge: HOME | End: 2023-09-18
Payer: MEDICARE

## 2023-09-20 ENCOUNTER — HOSPITAL ENCOUNTER (OUTPATIENT)
Dept: PHYSICAL THERAPY | Age: 73
Setting detail: THERAPIES SERIES
Discharge: HOME | End: 2023-09-20
Payer: MEDICARE

## 2023-09-20 ENCOUNTER — APPOINTMENT (OUTPATIENT)
Dept: PHYSICAL THERAPY | Age: 73
End: 2023-09-20
Payer: MEDICARE

## 2023-09-20 PROCEDURE — 97112 NEUROMUSCULAR REEDUCATION: CPT

## 2023-09-20 PROCEDURE — 97110 THERAPEUTIC EXERCISES: CPT

## 2023-09-20 NOTE — FLOWSHEET NOTE
lifting, ambulation. [x] (14036) Provided verbal/tactile cueing for activities related to improving balance, coordination, kinesthetic sense, posture, motor skill, proprioception  to assist with LE, proximal hip, and core control in self care, mobility, lifting, ambulation and eccentric single leg control. NMR and Therapeutic Activities:    [x] (41484 or 52969) Provided verbal/tactile cueing for activities related to improving balance, coordination, kinesthetic sense, posture, motor skill, proprioception and motor activation to allow for proper function of core, proximal hip and LE with self care and ADLs and functional mobility.   [] (16799) Gait Re-education- Provided training and instruction to the patient for proper LE, core and proximal hip recruitment and positioning and eccentric body weight control with ambulation re-education including up and down stairs     Home Exercise Program:    [] (52451) Reviewed/Progressed HEP activities related to strengthening, flexibility, endurance, ROM of core, proximal hip and LE for functional self-care, mobility, lifting and ambulation/stair navigation   [x] (06420)Reviewed/Progressed HEP activities related to improving balance, coordination, kinesthetic sense, posture, motor skill, proprioception of core, proximal hip and LE for self care, mobility, lifting, and ambulation/stair navigation      Manual Treatments:  PROM / STM / Oscillations-Mobs:  G-I, II, III, IV (PA's, Inf., Post.)  [] (74636) Provided manual therapy to mobilize LE, proximal hip and/or LS spine soft tissue/joints for the purpose of modulating pain, promoting relaxation,  increasing ROM, reducing/eliminating soft tissue swelling/inflammation/restriction, improving soft tissue extensibility and allowing for proper ROM for normal function with self care, mobility, lifting and ambulation. \     Charges: Tami Bright Required  Timed Code Treatment Minutes: 45   Total Treatment Minutes: 45      [] JERICHO (LOW)

## 2023-09-25 ENCOUNTER — HOSPITAL ENCOUNTER (OUTPATIENT)
Dept: PHYSICAL THERAPY | Age: 73
Setting detail: THERAPIES SERIES
Discharge: HOME | End: 2023-09-25
Payer: MEDICARE

## 2023-09-25 PROCEDURE — 97112 NEUROMUSCULAR REEDUCATION: CPT

## 2023-09-25 PROCEDURE — 97110 THERAPEUTIC EXERCISES: CPT

## 2023-09-25 NOTE — FLOWSHEET NOTE
[address] [facility] and Therapy, [address] [place], [place] [address]  Phone: [phone]   Fax:     [phone]      Physical Therapy Treatment Note/ Progress Report:     Date:  2023    Patient Name:  Aye Savage    :  1950  MRN: [id number]      Medical/Treatment Diagnosis Information:  Diagnosis: R26.89 Balance Problem  Treatment Diagnosis: R26.89 Balance Disorder    Insurance/Certification information:  PT Insurance Information: Medicare  Physician Information:  Dianna Troy MD  Plan of care signed (Y/N): cosign received    Date of Patient follow up with Physician:      Progress Report: []  Yes  [x]  No     Date Range for reporting period:  Beginnin23  Ending:      Progress report due (10 Rx/or 30 days whichever is less):     Recertification due (POC duration/ or 90 days whichever is less):      Visit # POC/Insurance Allowable Auth Needed   BMN []Yes   [x]No     Latex Allergy:  [x]NO      []YES  Preferred Language for Healthcare:   [x]English       []other:    History of Injury: : Patient reports loss of balance when walking on uneven areas (grass, woods0 \"I don't know where my feet are going . I can be standing there talking to someone and then I can lean. Going down stairs it's the coordination of the eyes and the feet, including at home, has to hold onto the handrails. \"  Patient has not had any falls, has been able to self correct with holding onto something. Patient has grab bars in bathroom.        Relevant Medical History: Asthma (seasonal), Diverticulitis, Irritable Bowel Syndrome; Vertigo (did undergo PT, has HEP), hernia      Functional Scale/Score: WOMAC=9 = 9.3% dysfunction (23)                                             TUG:  10.35 sec (23)     Pain Scale: 0/10    SUBJECTIVE:  See eval  23:  Patient reports doing okay, overall, feels like today is a \"bad day, a

## 2023-09-28 ENCOUNTER — HOSPITAL ENCOUNTER (OUTPATIENT)
Dept: PHYSICAL THERAPY | Age: 73
Setting detail: THERAPIES SERIES
Discharge: HOME | End: 2023-09-28
Payer: MEDICARE

## 2023-09-28 PROCEDURE — 97112 NEUROMUSCULAR REEDUCATION: CPT

## 2023-09-28 PROCEDURE — 97110 THERAPEUTIC EXERCISES: CPT

## 2023-09-28 NOTE — FLOWSHEET NOTE
little hazy\" as far as balance goes. 8/30/23:  Patient reports \"I could tell I was here\", had some muscle soreness but not bad. Patient did report previous R knee pain from 2019. PT reviewed chart, saw he was seen by DEMETRIO Matos, for patello-femoral arthritis and received an injection. 9/5/23:  Patient reports doing a little better overall. 9/7/23:  Patient reports doing okay, does get tired after PT, but after a rest does better. 9/11/23:  Patient reports doing better, still notices weakness in R LE  9/20: No new complaints. Doing about the same  9/25/23:  Patient reports doing pretty good overall, R knee has been sore.   9/28/23:  Patient reports doing pretty good, continues to struggle with strength R LE.    OBJECTIVE:  Observation: delayed VMO R knee  Test measurements:       RESTRICTIONS/PRECAUTIONS:     Exercises/Interventions:     Therapeutic Ex (37063)   Min:15 Reps/Resistance Notes/CUES   NuStep Level 1 x 6 min Seat, tibia #9   Calf Stretch/Incline 3 x 30 sec Incline board   Leg Press 120#, 3 x 10 reps No holds   Step Ups  Step Downs  Step [place] 4\", 1 x 10 reps L/R  4\", 1 x 10 reps L  2\", 1 x 10 reps R B handrails  B handrails  B handrails   NMR re-education (40508)  Min: 24 [facility] Ex Program  See below   Leg Extension 11#, 4  x 10 reps L/R Seat, tibia #1   Leg Curl 11#, 3 x 10 reps L/R Seat, tibia #1   Rhythmic Stabilization:  Ant                                         Post                                        Lat                                        Ant/Post Rot                                        Random             Airex Mat:  Ant                               Post                               Lat                               Ant/Post Rot                                Random 1 x 10 reps, mod resist  1 x 10 reps, mod resist  1 x 10 reps, mod/max resist  1 x 10 reps, mod resist  1 x 10 reps, mod resist  1 x 10 reps, mod resist  1 x 10 reps, mod resist  1 x 10 reps, mod

## 2023-10-02 ENCOUNTER — HOSPITAL ENCOUNTER (OUTPATIENT)
Dept: PHYSICAL THERAPY | Age: 73
Setting detail: THERAPIES SERIES
Discharge: HOME | End: 2023-10-02
Payer: MEDICARE

## 2023-10-02 PROCEDURE — 97110 THERAPEUTIC EXERCISES: CPT

## 2023-10-02 PROCEDURE — 97112 NEUROMUSCULAR REEDUCATION: CPT

## 2023-10-05 ENCOUNTER — HOSPITAL ENCOUNTER (OUTPATIENT)
Dept: PHYSICAL THERAPY | Age: 73
Setting detail: THERAPIES SERIES
Discharge: HOME | End: 2023-10-05
Payer: MEDICARE

## 2023-10-05 PROCEDURE — 97112 NEUROMUSCULAR REEDUCATION: CPT

## 2023-10-05 PROCEDURE — 97110 THERAPEUTIC EXERCISES: CPT

## 2023-10-05 NOTE — FLOWSHEET NOTE
Progressing: [] Met: [] Not Met: [] Adjusted  3. Patient will be independent with written home exercise program  [x] Progressing: [] Met: [] Not Met: [] Adjusted  4. Patient will return to light chores/ functional activities (I.e, light cooking, dusting, etc) without increased symptoms or restriction. [] Progressing: [x] Met: [] Not Met: [] Adjusted  5. Patient is able to negotiate stairs with reciprocal pattern without increased symptoms  [x] Progressing: [] Met: [] Not Met: [] Adjusted          Treatment/Activity Tolerance:  [x] Patient tolerated treatment well [] Patient limited by fatique  [] Patient limited by pain  [] Patient limited by other medical complications  [] Other:   9/20: Assistance with R LE for L LE knee ext, as needed. 10/5: cues for R LE technique with step downs and to slow down and control ecc leg ext    Overall Progression Towards Functional goals/ Treatment Progress Update:  [x] Patient is progressing as expected towards functional goals listed. [] Progression is slowed due to complexities/Impairments listed. [] Progression has been slowed due to co-morbidities. [] Plan just implemented, too soon to assess goals progression <30days   [] Goals require adjustment due to lack of progress  [] Patient is not progressing as expected and requires additional follow up with physician  [] Other    Prognosis for POC: [x] Good [] Fair  [] Poor    Patient requires continued skilled intervention: [x] Yes  [] No        PLAN: Monitor response. [x] Continue per plan of care [] Alter current plan (see comments)  [] Plan of care initiated [] Hold pending MD visit [] Discharge    Therapeutic Exercise and NMR EXR  [] (58602) Provided verbal/tactile cueing for activities related to strengthening, flexibility, endurance, ROM for improvements in LE, proximal hip, and core control with self care, mobility, lifting, ambulation.   [x] (06267) Provided verbal/tactile cueing for activities related to improving

## 2023-10-06 DIAGNOSIS — R97.20 RISING PSA LEVEL: ICD-10-CM

## 2023-10-06 LAB — PSA SERPL DL<=0.01 NG/ML-MCNC: 2.87 NG/ML (ref 0–4)

## 2023-10-09 ENCOUNTER — HOSPITAL ENCOUNTER (OUTPATIENT)
Dept: PHYSICAL THERAPY | Age: 73
Setting detail: THERAPIES SERIES
Discharge: HOME | End: 2023-10-09
Payer: MEDICARE

## 2023-10-09 PROCEDURE — 97110 THERAPEUTIC EXERCISES: CPT

## 2023-10-09 PROCEDURE — 97112 NEUROMUSCULAR REEDUCATION: CPT

## 2023-10-09 NOTE — FLOWSHEET NOTE
exercise program  [x] Progressing: [] Met: [] Not Met: [] Adjusted  4. Patient will return to light chores/ functional activities (I.e, light cooking, dusting, etc) without increased symptoms or restriction. [] Progressing: [x] Met: [] Not Met: [] Adjusted  5. Patient is able to negotiate stairs with reciprocal pattern without increased symptoms  [x] Progressing: [] Met: [] Not Met: [] Adjusted          Treatment/Activity Tolerance:  [x] Patient tolerated treatment well [] Patient limited by fatique  [] Patient limited by pain  [] Patient limited by other medical complications  [] Other:   9/20: Assistance with R LE for L LE knee ext, as needed. 10/5: cues for R LE technique with step downs and to slow down and control ecc leg ext  10/9: Leg ext reps increased per patient. Leg press not performed due to PTA error. Resume next visit    Overall Progression Towards Functional goals/ Treatment Progress Update:  [x] Patient is progressing as expected towards functional goals listed. [] Progression is slowed due to complexities/Impairments listed. [] Progression has been slowed due to co-morbidities. [] Plan just implemented, too soon to assess goals progression <30days   [] Goals require adjustment due to lack of progress  [] Patient is not progressing as expected and requires additional follow up with physician  [] Other    Prognosis for POC: [x] Good [] Fair  [] Poor    Patient requires continued skilled intervention: [x] Yes  [] No        PLAN: Monitor response. [x] Continue per plan of care [] Alter current plan (see comments)  [] Plan of care initiated [] Hold pending MD visit [] Discharge    Therapeutic Exercise and NMR EXR  [] (81216) Provided verbal/tactile cueing for activities related to strengthening, flexibility, endurance, ROM for improvements in LE, proximal hip, and core control with self care, mobility, lifting, ambulation.   [x] (37580) Provided verbal/tactile cueing for activities related to

## 2023-10-12 ENCOUNTER — HOSPITAL ENCOUNTER (OUTPATIENT)
Dept: PHYSICAL THERAPY | Age: 73
Setting detail: THERAPIES SERIES
Discharge: HOME | End: 2023-10-12
Payer: MEDICARE

## 2023-10-12 PROCEDURE — 97112 NEUROMUSCULAR REEDUCATION: CPT

## 2023-10-12 PROCEDURE — 97110 THERAPEUTIC EXERCISES: CPT

## 2023-10-12 NOTE — FLOWSHEET NOTE
Discussed at length anatomy and biomechanics of balance, muscle reeducation, motor recruitment. Home Exercise Program:  Patient instructed in chin tucks, lower trap sets, TA sets gluteal sets, quad sets, isometric hip add; written instructions with pictures issued, patient able to demonstrate exercises. 8/28/23:   Patient instructed in sitting in chair hamstring stretch, longsitting hip IR/ER; written instructions with pictures issued, patient able to demonstrate exercises. 8/30/23:   Patient instructed in VMO sets; written instructions with pictures issued, patient able to demonstrate exercises. 9/5/23:   Patient instructed in bent leg fall out, sidelying clamshell with pillow; written instructions with pictures issued, patient able to demonstrate exercises. 9/25/23:   Patient instructed in standing hip ext ; written instructions with pictures issued, patient able to demonstrate exercises. 9/28/23:   Patient instructed in B partial squats, B heel raises ; written instructions with pictures issued, patient able to demonstrate exercises. ASSESSMENT:  Patient has demonstrated progress towards PT goals, however goals have not yet been met. Patient would benefit from continued PT to increase functional mobility. GOALS:  Patient stated goal: \"Restore Balance\"  [x] Progressing: [] Met: [] Not Met: [] Adjusted     Therapist goals for Patient:   Short Term Goals: To be achieved in: 2 - 4 weeks  1. Patient will report no LOB episodes in 1 week time frame  [] Progressing: [x] Met: [] Not Met: [] Adjusted  2. Patient will demonstrate static standing balance >/= Fair  [] Progressing: [x] Met: [] Not Met: [] Adjusted     Long Term Goals: To be achieved in: 4-8 weeks  1. Disability index score of   5 or less for the [facility] to assist with reaching prior level of function. [x] Progressing: [] Met: [] Not Met: [] Adjusted  2.  Patient will demonstrate increased Balance to enable amb with/without AD, gait

## 2023-10-12 NOTE — PLAN OF CARE
exercises. 9/25/23:   Patient instructed in standing hip ext ; written instructions with pictures issued, patient able to demonstrate exercises. 9/28/23:   Patient instructed in B partial squats, B heel raises ; written instructions with pictures issued, patient able to demonstrate exercises. ASSESSMENT:  Patient has demonstrated progress towards PT goals, however goals have not yet been met. Patient would benefit from continued PT to increase functional mobility. GOALS:  Patient stated goal: \"Restore Balance\"  [x] Progressing: [] Met: [] Not Met: [] Adjusted     Therapist goals for Patient:   Short Term Goals: To be achieved in: 2 - 4 weeks  1. Patient will report no LOB episodes in 1 week time frame  [] Progressing: [x] Met: [] Not Met: [] Adjusted  2. Patient will demonstrate static standing balance >/= Fair  [] Progressing: [x] Met: [] Not Met: [] Adjusted     Long Term Goals: To be achieved in: 4-8 weeks  1. Disability index score of   5 or less for the [facility] to assist with reaching prior level of function. [x] Progressing: [] Met: [] Not Met: [] Adjusted  2. Patient will demonstrate increased Balance to enable amb with/without AD, gait pattern WNL, >/= 15 minutes  [x] Progressing: [] Met: [] Not Met: [] Adjusted  3. Patient will be independent with written home exercise program  [x] Progressing: [] Met: [] Not Met: [] Adjusted  4. Patient will return to light chores/ functional activities (I.e, light cooking, dusting, etc) without increased symptoms or restriction. [] Progressing: [x] Met: [] Not Met: [] Adjusted  5.  Patient is able to negotiate stairs with reciprocal pattern without increased symptoms  [x] Progressing: [] Met: [] Not Met: [] Adjusted          Treatment/Activity Tolerance:  [x] Patient tolerated treatment well [] Patient limited by fatique  [] Patient limited by pain  [] Patient limited by other medical complications  [] Other:   9/20: Assistance with R LE for L LE knee ext,

## 2023-10-16 ENCOUNTER — HOSPITAL ENCOUNTER (OUTPATIENT)
Dept: PHYSICAL THERAPY | Age: 73
Setting detail: THERAPIES SERIES
Discharge: HOME | End: 2023-10-16
Payer: MEDICARE

## 2023-10-16 PROCEDURE — 97112 NEUROMUSCULAR REEDUCATION: CPT

## 2023-10-16 PROCEDURE — 97110 THERAPEUTIC EXERCISES: CPT

## 2023-10-16 NOTE — FLOWSHEET NOTE
reducing/eliminating soft tissue swelling/inflammation/restriction, improving soft tissue extensibility and allowing for proper ROM for normal function with self care, mobility, lifting and ambulation. \     Charges: Ashley Guido Required  Timed Code Treatment Minutes: 50   Total Treatment Minutes: 50      [] EVAL (LOW) 39790 (typically 20 minutes face-to-face)  [] EVAL (MOD) 28280 (typically 30 minutes face-to-face)  [] EVAL (HIGH) 26995 (typically 45 minutes face-to-face)  [] RE-EVAL     [x] CD(58100) x     [] Dry needle 1 or 2 Muscles (58105)  [x] NMR (30730) x  2   [] Dry needle 3+ Muscles (38067)  [] Manual (94066) x     [] Ultrasound (50592) x  [] TA (20211) x     [] Mech Traction (67479)  [] ES(attended) (56546)     [] ES (un) (29274):   [] Vasopump (24965) [] Ionto (31891)   [] Other:      Electronically signed by: Francisco Preston, PT [id number]    Note: If patient does not return for scheduled/recommended follow up visits, this note will serve as a discharge from care along with the most recent update on progress.

## 2023-10-19 ENCOUNTER — HOSPITAL ENCOUNTER (OUTPATIENT)
Dept: PHYSICAL THERAPY | Age: 73
Setting detail: THERAPIES SERIES
Discharge: HOME | End: 2023-10-19
Payer: MEDICARE

## 2023-10-19 PROCEDURE — 97110 THERAPEUTIC EXERCISES: CPT

## 2023-10-19 PROCEDURE — 97112 NEUROMUSCULAR REEDUCATION: CPT

## 2023-10-19 NOTE — FLOWSHEET NOTE
plan of care [] Alter current plan (see comments)  [] Plan of care initiated [] Hold pending MD visit [] Discharge    Therapeutic Exercise and NMR EXR  [] (50571) Provided verbal/tactile cueing for activities related to strengthening, flexibility, endurance, ROM for improvements in LE, proximal hip, and core control with self care, mobility, lifting, ambulation. [x] (25854) Provided verbal/tactile cueing for activities related to improving balance, coordination, kinesthetic sense, posture, motor skill, proprioception  to assist with LE, proximal hip, and core control in self care, mobility, lifting, ambulation and eccentric single leg control.      NMR and Therapeutic Activities:    [x] (31306 or 94446) Provided verbal/tactile cueing for activities related to improving balance, coordination, kinesthetic sense, posture, motor skill, proprioception and motor activation to allow for proper function of core, proximal hip and LE with self care and ADLs and functional mobility.   [] (56800) Gait Re-education- Provided training and instruction to the patient for proper LE, core and proximal hip recruitment and positioning and eccentric body weight control with ambulation re-education including up and down stairs     Home Exercise Program:    [] (00661) Reviewed/Progressed HEP activities related to strengthening, flexibility, endurance, ROM of core, proximal hip and LE for functional self-care, mobility, lifting and ambulation/stair navigation   [x] (82379)Reviewed/Progressed HEP activities related to improving balance, coordination, kinesthetic sense, posture, motor skill, proprioception of core, proximal hip and LE for self care, mobility, lifting, and ambulation/stair navigation      Manual Treatments:  PROM / STM / Oscillations-Mobs:  G-I, II, III, IV (PA's, Inf., Post.)  [] (52172) Provided manual therapy to mobilize LE, proximal hip and/or LS spine soft tissue/joints for the purpose of modulating pain, promoting

## 2023-10-23 ENCOUNTER — APPOINTMENT (OUTPATIENT)
Dept: PHYSICAL THERAPY | Age: 73
End: 2023-10-23
Payer: MEDICARE

## 2023-10-24 ENCOUNTER — HOSPITAL ENCOUNTER (OUTPATIENT)
Dept: PHYSICAL THERAPY | Age: 73
Setting detail: THERAPIES SERIES
Discharge: HOME | End: 2023-10-24
Payer: MEDICARE

## 2023-10-24 PROCEDURE — 97112 NEUROMUSCULAR REEDUCATION: CPT

## 2023-10-24 PROCEDURE — 97110 THERAPEUTIC EXERCISES: CPT

## 2023-10-24 NOTE — FLOWSHEET NOTE
with R LE       RESTRICTIONS/PRECAUTIONS:     Exercises/Interventions:     Therapeutic Ex (85031)   Min:15 Reps/Resistance Notes/CUES   NuStep Level 1 x 6 min Seat, tibia #9   Calf Stretch/Incline 3 x 30 sec Incline board   Leg Press 120#, 3 x 10 reps No holds   Step Ups  Step Downs  Step Downs 6\", 1 x 10 reps L/R  6\", 1 x 10 reps L  4\", 1 x 10 reps R B handrails  B handrails  B handrails   NMR re-education (33137)  Min: 24 [facility] Ex Program  See below   Leg Extension 16#, 3  x 10 reps L/R Seat, tibia #1   Leg Curl 16#, 3 x 10 reps L/R Seat, tibia #1   Rhythmic Stabilization:  Ant                                         Post                                        Lat                                        Ant/Post Rot                                        Random             Airex Mat:  Ant                               Post                               Lat                               Ant/Post Rot                                Random  CobbleFoam:  Ant                          Post                          Lat                          Random  AirEx on Cobblefoam:  Ant                                       Post                                        Lat                                        Random 1 x 10 reps, mod/max resist  1 x 10 reps, mod/max resist  1 x 10 reps, mod/max resist  1 x 10 reps, mod/max resist  1 x 10 reps, mod/max resist  1 x 10 reps, mod resist  1 x 10 reps, mod resist  1 x 10 reps, mod resist  1 x 10 reps mod resist  1 x 10 reps min/mod resist  1 x 10 reps mod resist  1 x 10 mod resist  1 x 10 mod resist  1 x 10 mod resist  1 x 10 min/mod resist  1 x 10 min/mod resist  1 x 10 min/mod resist  1 x 10 min/mod resist Standing, pelvic contacts   CobbleFoam: Step Thru    AirEx onCobbleFoam:  Step Thru 1 x 10 reps L/R  1 x 10 reps L/R Contra hand on rail  B handrails   BAPS Level 2, 1  x 15 reps CW, CCW Standing, contra LE on BAPS board   Therapeutic Activity (48517)  Min:     Patient

## 2023-10-26 ENCOUNTER — HOSPITAL ENCOUNTER (OUTPATIENT)
Dept: PHYSICAL THERAPY | Age: 73
Setting detail: THERAPIES SERIES
Discharge: HOME | End: 2023-10-26
Payer: MEDICARE

## 2023-10-26 PROCEDURE — 97112 NEUROMUSCULAR REEDUCATION: CPT

## 2023-10-26 PROCEDURE — 97110 THERAPEUTIC EXERCISES: CPT

## 2023-10-26 NOTE — FLOWSHEET NOTE
Physical Therapy Treatment Note/ Progress Report:     Date:  10/26/2023    Patient Name:  Hever Ny    :  1950  MRN: [id number]      Medical/Treatment Diagnosis Information:  Diagnosis: R26.89 Balance Problem  Treatment Diagnosis: R26.89 Balance Disorder    Insurance/Certification information:  PT Insurance Information: Medicare  Physician Information:  Christine Kocher, MD  Plan of care signed (Y/N): cosign received    Date of Patient follow up with Physician:      Progress Report: []  Yes  [x]  No     Date Range for reporting period:  Beginnin23  Ending:  10/2/23    Progress report due (10 Rx/or 30 days whichever is less):     Recertification due (POC duration/ or 90 days whichever is less):      Visit # POC/Insurance Allowable Auth Needed   /BMN []Yes   [x]No     Latex Allergy:  [x]NO      []YES  Preferred Language for Healthcare:   [x]English       []other:    History of Injury: : Patient reports loss of balance when walking on uneven areas (grass, woods0 \"I don't know where my feet are going . I can be standing there talking to someone and then I can lean. Going down stairs it's the coordination of the eyes and the feet, including at home, has to hold onto the handrails. \"  Patient has not had any falls, has been able to self correct with holding onto something. Patient has grab bars in bathroom.        Relevant Medical History: Asthma (seasonal), Diverticulitis, Irritable Bowel Syndrome; Vertigo (did undergo PT, has HEP), hernia      Functional Scale/Score: WOMAC=9 = 9.3% dysfunction (23)                                             WOMAC = 32.3% dysfunction (10/2/23)                                             TUG:  10.35 sec (23)                                              TU.48 sec (10/2/23     Pain Scale: 0/10    SUBJECTIVE:  See eval  23:  Patient reports doing okay, overall, feels like today is a \"bad day, a little hazy\" as far as balance

## 2023-10-30 ENCOUNTER — HOSPITAL ENCOUNTER (OUTPATIENT)
Dept: PHYSICAL THERAPY | Age: 73
Setting detail: THERAPIES SERIES
Discharge: HOME | End: 2023-10-30
Payer: MEDICARE

## 2023-10-30 PROCEDURE — 97110 THERAPEUTIC EXERCISES: CPT

## 2023-10-30 PROCEDURE — 97112 NEUROMUSCULAR REEDUCATION: CPT

## 2023-12-08 ENCOUNTER — OFFICE VISIT (OUTPATIENT)
Dept: FAMILY MEDICINE CLINIC | Age: 73
End: 2023-12-08

## 2023-12-08 VITALS
OXYGEN SATURATION: 97 % | DIASTOLIC BLOOD PRESSURE: 76 MMHG | WEIGHT: 218 LBS | HEIGHT: 70 IN | HEART RATE: 95 BPM | SYSTOLIC BLOOD PRESSURE: 136 MMHG | TEMPERATURE: 97.3 F | BODY MASS INDEX: 31.21 KG/M2

## 2023-12-08 DIAGNOSIS — J01.90 ACUTE BACTERIAL SINUSITIS: Primary | ICD-10-CM

## 2023-12-08 DIAGNOSIS — B96.89 ACUTE BACTERIAL SINUSITIS: Primary | ICD-10-CM

## 2023-12-08 RX ORDER — AZITHROMYCIN 250 MG/1
250 TABLET, FILM COATED ORAL SEE ADMIN INSTRUCTIONS
Qty: 6 TABLET | Refills: 0 | Status: SHIPPED | OUTPATIENT
Start: 2023-12-08 | End: 2023-12-13

## 2023-12-08 ASSESSMENT — PATIENT HEALTH QUESTIONNAIRE - PHQ9
SUM OF ALL RESPONSES TO PHQ QUESTIONS 1-9: 0
SUM OF ALL RESPONSES TO PHQ9 QUESTIONS 1 & 2: 0
SUM OF ALL RESPONSES TO PHQ QUESTIONS 1-9: 0
1. LITTLE INTEREST OR PLEASURE IN DOING THINGS: 0
2. FEELING DOWN, DEPRESSED OR HOPELESS: 0

## 2023-12-08 ASSESSMENT — ENCOUNTER SYMPTOMS
SHORTNESS OF BREATH: 0
SINUS PAIN: 1
RHINORRHEA: 1
APNEA: 0
SINUS PRESSURE: 1
COUGH: 0
ABDOMINAL PAIN: 0

## 2023-12-08 NOTE — PROGRESS NOTES
Dameon French (:  1950) is a 68 y.o. male,Established patient, here for evaluation of the following chief complaint(s):  Sinusitis (Sinus issues for the last 2 weeks - taking OTC flonase  - )    Dameon French is a 68 y.o. male with the following history as recorded in EpicCare:  Patient Active Problem List    Diagnosis Date Noted    Umbilical hernia without obstruction and without gangrene     Mixed hyperlipidemia 2009     Current Outpatient Medications   Medication Sig Dispense Refill    B Complex Vitamins (VITAMIN B-COMPLEX PO) Take by mouth      docusate sodium (COLACE) 100 MG capsule Take 1 capsule by mouth 2 times daily      Probiotic Product (PROBIOTIC PO) Take by mouth      metroNIDAZOLE (METROGEL) 0.75 % gel Apply topically  daily. 45 g 0    albuterol sulfate HFA (VENTOLIN HFA) 108 (90 Base) MCG/ACT inhaler Inhale 2 puffs into the lungs 4 times daily as needed for Wheezing 18 g 0    azelastine (ASTELIN) 0.1 % nasal spray 2 sprays by Nasal route 2 times daily Use in each nostril as directed 30 mL 3    Magnesium 100 MG CAPS Take 400 mg by mouth daily       vitamin C (ASCORBIC ACID) 500 MG tablet Take 2 tablets by mouth daily      Multiple Vitamins-Minerals (THERAPEUTIC MULTIVITAMIN-MINERALS) tablet Take 1 tablet by mouth daily      glucosamine-chondroitin 500-400 MG CAPS Take by mouth daily       magnesium citrate solution Take 296 mLs by mouth once for 1 dose 296 mL 0     No current facility-administered medications for this visit.      Allergies: Penicillins  Past Medical History:   Diagnosis Date    Asthma     Diverticulitis     Influenza A 2020    Irritable bowel syndrome     Prostatitis     Seasonal allergies      Past Surgical History:   Procedure Laterality Date    COLONOSCOPY  2012    Dr. Edmund Rodriguez, check in 5 years    COLONOSCOPY  2017    dr rhodes at [facility] polyp, repeat 5 years    HERNIA REPAIR      umbilical    NASAL SINUS SURGERY  about      Family

## 2024-02-01 ENCOUNTER — OFFICE VISIT (OUTPATIENT)
Dept: FAMILY MEDICINE CLINIC | Age: 74
End: 2024-02-01

## 2024-02-01 ENCOUNTER — TELEPHONE (OUTPATIENT)
Dept: ENT CLINIC | Age: 74
End: 2024-02-01

## 2024-02-01 VITALS
HEIGHT: 70 IN | WEIGHT: 228.8 LBS | BODY MASS INDEX: 32.75 KG/M2 | TEMPERATURE: 98.4 F | SYSTOLIC BLOOD PRESSURE: 138 MMHG | DIASTOLIC BLOOD PRESSURE: 82 MMHG

## 2024-02-01 DIAGNOSIS — H10.33 ACUTE CONJUNCTIVITIS OF BOTH EYES, UNSPECIFIED ACUTE CONJUNCTIVITIS TYPE: Primary | ICD-10-CM

## 2024-02-01 RX ORDER — AZELASTINE 1 MG/ML
2 SPRAY, METERED NASAL 2 TIMES DAILY
Qty: 30 ML | Refills: 0 | Status: SHIPPED | OUTPATIENT
Start: 2024-02-01

## 2024-02-01 RX ORDER — SULFACETAMIDE SODIUM 100 MG/ML
2 SOLUTION/ DROPS OPHTHALMIC 4 TIMES DAILY
Qty: 1 EACH | Refills: 0 | Status: SHIPPED | OUTPATIENT
Start: 2024-02-01 | End: 2024-02-08

## 2024-02-01 NOTE — TELEPHONE ENCOUNTER
Pt requesting refill of azelastine (ASTELIN) 0.1 % nasal spray; please send to [facility] in [place]; call when script has been sent

## 2024-02-01 NOTE — PROGRESS NOTES
Large Adult          Temp:       98.4 °F (36.9 °C)       TempSrc:        Temporal            Weight: 103.8 kg (228 lb 12.8 oz)   Height:     1.778 m (5' 10\")       -----------------------------------  Body mass index is 32.83 kg/m².     Wt Readings from Last 3 Encounters:  02/01/24 : 103.8 kg (228 lb 12.8 oz)  12/08/23 : 98.9 kg (218 lb)  08/10/23 : 98.4 kg (217 lb)    BP Readings from Last 3 Encounters:  02/01/24 : 138/82  12/08/23 : 136/76  08/10/23 : 128/82            Review of Systems    Objective:   Physical Exam  Constitutional:       General: He is not in acute distress.     Appearance: Normal appearance. He is not ill-appearing.   Eyes:      General:         Left eye: Discharge present.     Conjunctiva/sclera:      Left eye: Left conjunctiva is injected.      Comments: Starting with injection on the right as well though not as severe   Neurological:      Mental Status: He is alert.         Assessment:       Diagnosis Orders   1. Acute conjunctivitis of both eyes, unspecified acute conjunctivitis type          Orders Placed This Encounter   Medications    sulfacetamide (BLEPH-10) 10 % ophthalmic solution     Sig: Place 2 drops into both eyes 4 times daily for 7 days     Dispense:  1 each     Refill:  0           Plan:      See in june for a check up  Call if not getting better         Aidan Skelton MD

## 2024-04-01 ENCOUNTER — HOSPITAL ENCOUNTER (OUTPATIENT)
Dept: GENERAL RADIOLOGY | Age: 74
Discharge: HOME | End: 2024-04-01
Payer: MEDICARE

## 2024-04-01 ENCOUNTER — OFFICE VISIT (OUTPATIENT)
Dept: FAMILY MEDICINE CLINIC | Age: 74
End: 2024-04-01
Payer: MEDICARE

## 2024-04-01 ENCOUNTER — HOSPITAL ENCOUNTER (OUTPATIENT)
Age: 74
Discharge: HOME | End: 2024-04-01
Payer: MEDICARE

## 2024-04-01 VITALS
BODY MASS INDEX: 32.33 KG/M2 | WEIGHT: 225.8 LBS | HEIGHT: 70 IN | DIASTOLIC BLOOD PRESSURE: 82 MMHG | SYSTOLIC BLOOD PRESSURE: 118 MMHG | TEMPERATURE: 97.9 F

## 2024-04-01 DIAGNOSIS — R05.3 CHRONIC COUGH: ICD-10-CM

## 2024-04-01 DIAGNOSIS — M25.461 SWELLING OF JOINT OF RIGHT KNEE: ICD-10-CM

## 2024-04-01 DIAGNOSIS — R05.3 CHRONIC COUGH: Primary | ICD-10-CM

## 2024-04-01 PROCEDURE — G8417 CALC BMI ABV UP PARAM F/U: HCPCS | Performed by: FAMILY MEDICINE

## 2024-04-01 PROCEDURE — 3017F COLORECTAL CA SCREEN DOC REV: CPT | Performed by: FAMILY MEDICINE

## 2024-04-01 PROCEDURE — 1036F TOBACCO NON-USER: CPT | Performed by: FAMILY MEDICINE

## 2024-04-01 PROCEDURE — 1123F ACP DISCUSS/DSCN MKR DOCD: CPT | Performed by: FAMILY MEDICINE

## 2024-04-01 PROCEDURE — G8427 DOCREV CUR MEDS BY ELIG CLIN: HCPCS | Performed by: FAMILY MEDICINE

## 2024-04-01 PROCEDURE — 71046 X-RAY EXAM CHEST 2 VIEWS: CPT

## 2024-04-01 PROCEDURE — 99213 OFFICE O/P EST LOW 20 MIN: CPT | Performed by: FAMILY MEDICINE

## 2024-04-01 RX ORDER — AZELASTINE 1 MG/ML
2 SPRAY, METERED NASAL 2 TIMES DAILY
Qty: 30 ML | Refills: 0 | Status: SHIPPED | OUTPATIENT
Start: 2024-04-01

## 2024-04-01 NOTE — PROGRESS NOTES
Subjective:      Patient ID: Wm Tariq is a 74 y.o. male.    Chief Complaint   Patient presents with    Cough     x years    Knee Problem     Right knee fluid build up         Patient presents with:  Cough: x years  Knee Problem: Right knee fluid build up     3 weeks of some puffy about the knee and tight to get about  No injury  He has been doing exercises  It feel ok now   No giving out  No pain as such  He was told that knee cap subluxed in past    Cough   For some time  Seems to have a tickle in throat  Typically worse in the winter and gets pnd  Dry   10+ year  And will go away in the summer months   No blood  No fever  No night sweats   He will note at times some wheeze not new   No sob no cp    YOB: 1950    Date of Visit:  4/1/2024     -- Penicillins -- Hives    Current Outpatient Medications:  azelastine (ASTELIN) 0.1 % nasal spray, 2 sprays by Nasal route 2 times daily Use in each nostril as directed, Disp: 30 mL, Rfl: 0  B Complex Vitamins (VITAMIN B-COMPLEX PO), Take by mouth, Disp: , Rfl:   docusate sodium (COLACE) 100 MG capsule, Take 1 capsule by mouth 2 times daily, Disp: , Rfl:   Probiotic Product (PROBIOTIC PO), Take by mouth, Disp: , Rfl:   metroNIDAZOLE (METROGEL) 0.75 % gel, Apply topically  daily., Disp: 45 g, Rfl: 0  albuterol sulfate HFA (VENTOLIN HFA) 108 (90 Base) MCG/ACT inhaler, Inhale 2 puffs into the lungs 4 times daily as needed for Wheezing, Disp: 18 g, Rfl: 0  Magnesium 100 MG CAPS, Take 400 mg by mouth daily , Disp: , Rfl:   vitamin C (ASCORBIC ACID) 500 MG tablet, Take 2 tablets by mouth daily, Disp: , Rfl:   Multiple Vitamins-Minerals (THERAPEUTIC MULTIVITAMIN-MINERALS) tablet, Take 1 tablet by mouth daily, Disp: , Rfl:   glucosamine-chondroitin 500-400 MG CAPS, Take by mouth daily , Disp: , Rfl:    magnesium citrate solution, Take 296 mLs by mouth once for 1 dose, Disp: 296 mL, Rfl: 0    No current facility-administered medications for this

## 2024-04-01 NOTE — PATIENT INSTRUCTIONS
See the orthopedic person for the knee  Use the azelastine on regular basis  Call me with update in about 2-3 week

## 2024-04-15 ENCOUNTER — HOSPITAL ENCOUNTER (OUTPATIENT)
Dept: PULMONOLOGY | Age: 74
Discharge: HOME | End: 2024-04-15
Attending: FAMILY MEDICINE
Payer: MEDICARE

## 2024-04-15 DIAGNOSIS — R05.3 CHRONIC COUGH: ICD-10-CM

## 2024-04-15 LAB
DLCO %PRED: 123 %
DLCO PRED: NORMAL
DLCO/VA %PRED: 128 %
DLCO/VA PRED: NORMAL
DLCO/VA: NORMAL
DLCO: NORMAL
EXPIRATORY TIME-POST: NORMAL
EXPIRATORY TIME: NORMAL
FEF 25-75 %CHNG: NORMAL
FEF 25-75 POST %PRED: NORMAL
FEF 25-75% %PRED-PRE: NORMAL
FEF 25-75% PRED: NORMAL
FEF 25-75-POST: NORMAL
FEF 25-75-PRE: NORMAL
FEV1 %PRED-POST: 97 %
FEV1 %PRED-PRE: 89 %
FEV1 PRED: NORMAL
FEV1-POST: NORMAL
FEV1-PRE: NORMAL
FEV1/FVC %PRED-POST: NORMAL
FEV1/FVC %PRED-PRE: NORMAL
FEV1/FVC PRED: NORMAL
FEV1/FVC-POST: 78 %
FEV1/FVC-PRE: 75 %
FVC %PRED-POST: 94 L
FVC %PRED-PRE: 90 %
FVC PRED: NORMAL
FVC-POST: 3.5 L
FVC-PRE: 3.37 L
GAW %PRED: NORMAL
GAW PRED: NORMAL
GAW: NORMAL
IC PRE %PRED: NORMAL
IC PRED: NORMAL
IC: NORMAL
MEP: NORMAL
MIP: NORMAL
MVV %PRED-PRE: NORMAL
MVV PRED: NORMAL
MVV-PRE: NORMAL
PEF %PRED-POST: NORMAL
PEF %PRED-PRE: NORMAL
PEF PRED: NORMAL
PEF%CHNG: NORMAL
PEF-POST: NORMAL
PEF-PRE: NORMAL
RAW %PRED: NORMAL
RAW PRED: NORMAL
RAW: NORMAL
RV PRE %PRED: NORMAL
RV PRED: NORMAL
RV: NORMAL
SVC %PRED: NORMAL
SVC PRED: NORMAL
SVC: NORMAL
TLC PRE %PRED: 93 %
TLC PRED: NORMAL
TLC: NORMAL
VA %PRED: 95 %
VA PRED: NORMAL
VA: 5.51 L
VTG %PRED: NORMAL
VTG PRED: NORMAL
VTG: NORMAL

## 2024-04-15 PROCEDURE — 94726 PLETHYSMOGRAPHY LUNG VOLUMES: CPT

## 2024-04-15 PROCEDURE — 94729 DIFFUSING CAPACITY: CPT

## 2024-04-15 PROCEDURE — 94729 DIFFUSING CAPACITY: CPT | Performed by: INTERNAL MEDICINE

## 2024-04-15 PROCEDURE — 94060 EVALUATION OF WHEEZING: CPT

## 2024-04-15 PROCEDURE — 94640 AIRWAY INHALATION TREATMENT: CPT

## 2024-04-15 PROCEDURE — 94060 EVALUATION OF WHEEZING: CPT | Performed by: INTERNAL MEDICINE

## 2024-04-15 PROCEDURE — 94664 DEMO&/EVAL PT USE INHALER: CPT

## 2024-04-15 PROCEDURE — 94726 PLETHYSMOGRAPHY LUNG VOLUMES: CPT | Performed by: INTERNAL MEDICINE

## 2024-04-15 PROCEDURE — 6370000000 HC RX 637 (ALT 250 FOR IP): Performed by: FAMILY MEDICINE

## 2024-04-15 RX ORDER — ALBUTEROL SULFATE 90 UG/1
4 AEROSOL, METERED RESPIRATORY (INHALATION) ONCE
Status: COMPLETED | OUTPATIENT
Start: 2024-04-15 | End: 2024-04-15

## 2024-04-15 RX ADMIN — ALBUTEROL SULFATE 4 PUFF: 90 AEROSOL, METERED RESPIRATORY (INHALATION) at 13:23

## 2024-04-15 ASSESSMENT — PULMONARY FUNCTION TESTS
FVC_PRE: 3.37
FEV1_PERCENT_PREDICTED_PRE: 89
FEV1/FVC_POST: 78
FVC_PERCENT_PREDICTED_PRE: 90
FVC_POST: 3.50
FEV1/FVC_PRE: 75
FVC_PERCENT_PREDICTED_POST: 94
FEV1_PERCENT_PREDICTED_POST: 97

## 2024-04-15 NOTE — PROCEDURES
spirometry was acceptable and reproducible by ATS standards      Spirometry/Flow volume loop:  No active airflow obstruction or statistically significant postbronchodilator response.    Lung volumes:  Within normal    Diffusing capacity:  Normal diffusion capacity    Impression:  Normal PFT    OBSTRUCTION % Predicted FEV1   MILD >70%   MODERATE 60-69%   MODERATELY-SEVERE 50-59%   SEVERE 35-49%   VERY SEVERE <35%         RESTRICTION % Predicted TLC   MILD 66-80%   MODERATE 54-65%   MODERATELY-SEVERE <54%                 DIFFUSION CAPACITY DLCO % Pred   MILD >60% AND < LLN   MODERATE 40-60%   SEVERE <40%       PFT data will be scanned into the media tab under this encounter. Please see the scanned data for numerical values.     Shashi Appiah MD  [facility]

## 2024-04-23 ENCOUNTER — OFFICE VISIT (OUTPATIENT)
Dept: PULMONOLOGY | Age: 74
End: 2024-04-23
Payer: MEDICARE

## 2024-04-23 VITALS
DIASTOLIC BLOOD PRESSURE: 76 MMHG | RESPIRATION RATE: 18 BRPM | TEMPERATURE: 97.3 F | SYSTOLIC BLOOD PRESSURE: 123 MMHG | BODY MASS INDEX: 34.07 KG/M2 | WEIGHT: 230 LBS | OXYGEN SATURATION: 97 % | HEIGHT: 69 IN | HEART RATE: 77 BPM

## 2024-04-23 DIAGNOSIS — J45.909 MILD REACTIVE AIRWAYS DISEASE, UNSPECIFIED WHETHER PERSISTENT: Primary | ICD-10-CM

## 2024-04-23 DIAGNOSIS — J32.9 RHINOSINUSITIS: ICD-10-CM

## 2024-04-23 DIAGNOSIS — J45.990 EXERCISE-INDUCED ASTHMA: ICD-10-CM

## 2024-04-23 PROCEDURE — 1123F ACP DISCUSS/DSCN MKR DOCD: CPT | Performed by: INTERNAL MEDICINE

## 2024-04-23 PROCEDURE — 99204 OFFICE O/P NEW MOD 45 MIN: CPT | Performed by: INTERNAL MEDICINE

## 2024-04-23 PROCEDURE — 1036F TOBACCO NON-USER: CPT | Performed by: INTERNAL MEDICINE

## 2024-04-23 PROCEDURE — G8428 CUR MEDS NOT DOCUMENT: HCPCS | Performed by: INTERNAL MEDICINE

## 2024-04-23 PROCEDURE — 3017F COLORECTAL CA SCREEN DOC REV: CPT | Performed by: INTERNAL MEDICINE

## 2024-04-23 PROCEDURE — G8417 CALC BMI ABV UP PARAM F/U: HCPCS | Performed by: INTERNAL MEDICINE

## 2024-04-23 RX ORDER — ALBUTEROL SULFATE 90 UG/1
2 AEROSOL, METERED RESPIRATORY (INHALATION) EVERY 4 HOURS PRN
Qty: 18 G | Refills: 5 | Status: SHIPPED | OUTPATIENT
Start: 2024-04-23

## 2024-04-23 RX ORDER — MONTELUKAST SODIUM 10 MG/1
10 TABLET ORAL DAILY
Qty: 90 TABLET | Refills: 1 | Status: SHIPPED | OUTPATIENT
Start: 2024-04-23

## 2024-04-23 RX ORDER — PREDNISONE 10 MG/1
TABLET ORAL
Qty: 30 TABLET | Refills: 0 | Status: SHIPPED | OUTPATIENT
Start: 2024-04-23

## 2024-04-23 ASSESSMENT — ASTHMA QUESTIONNAIRES
QUESTION_5 LAST FOUR WEEKS HOW WOULD YOU RATE YOUR ASTHMA CONTROL: POORLY CONTROLED
QUESTION_1 LAST FOUR WEEKS HOW MUCH OF THE TIME DID YOUR ASTHMA KEEP YOU FROM GETTING AS MUCH DONE AT WORK, SCHOOL OR AT HOME: A LITTLE OF THE TIME
QUESTION_4 LAST FOUR WEEKS HOW OFTEN HAVE YOU USED YOUR RESCUE INHALER OR NEBULIZER MEDICATION (SUCH AS ALBUTEROL): 3 OR MORE TIMES PER DAY
QUESTION_3 LAST FOUR WEEKS HOW OFTEN DID YOUR ASTHMA SYMPTOMS (WHEEZING, COUGHING, SHORTNESS OF BREATH, CHEST TIGHTNESS OR PAIN) WAKE YOU UP AT NIGHT OR EARLIER THAN USUAL IN THE MORNING: ONCE A WEEK
ACT_TOTALSCORE: 14
QUESTION_2 LAST FOUR WEEKS HOW OFTEN HAVE YOU HAD SHORTNESS OF BREATH: ONCE OR TWICE A WEEK

## 2024-04-23 NOTE — PROGRESS NOTES
Insecurity: Not on file (4/10/2023)   Transportation Needs: Unknown (4/10/2023)    PRAPARE - Transportation     Lack of Transportation (Medical): Not on file     Lack of Transportation (Non-Medical): No   Physical Activity: Insufficiently Active (4/10/2023)    Exercise Vital Sign     Days of Exercise per Week: 4 days     Minutes of Exercise per Session: 20 min   Stress: Not on file   Social Connections: Not on file   Intimate Partner Violence: Not on file   Housing Stability: Unknown (4/10/2023)    Housing Stability Vital Sign     Unable to Pay for Housing in the Last Year: Not on file     Number of Places Lived in the Last Year: Not on file     Unstable Housing in the Last Year: No       Family History   Problem Relation Age of Onset    Cancer Father         colon    High Blood Pressure Father          Current Outpatient Medications:     predniSONE (DELTASONE) 10 MG tablet, 40mg for 3days 30mg for 3days 20mg for 3days 10mg for 3days, Disp: 30 tablet, Rfl: 0    montelukast (SINGULAIR) 10 MG tablet, Take 1 tablet by mouth daily, Disp: 90 tablet, Rfl: 1    albuterol sulfate HFA (PROAIR HFA) 108 (90 Base) MCG/ACT inhaler, Inhale 2 puffs into the lungs every 4 hours as needed for Wheezing or Shortness of Breath, Disp: 18 g, Rfl: 5    azelastine (ASTELIN) 0.1 % nasal spray, 2 sprays by Nasal route 2 times daily Use in each nostril as directed, Disp: 30 mL, Rfl: 0    B Complex Vitamins (VITAMIN B-COMPLEX PO), Take by mouth, Disp: , Rfl:     docusate sodium (COLACE) 100 MG capsule, Take 1 capsule by mouth 2 times daily, Disp: , Rfl:     Probiotic Product (PROBIOTIC PO), Take by mouth, Disp: , Rfl:     metroNIDAZOLE (METROGEL) 0.75 % gel, Apply topically  daily., Disp: 45 g, Rfl: 0    albuterol sulfate HFA (VENTOLIN HFA) 108 (90 Base) MCG/ACT inhaler, Inhale 2 puffs into the lungs 4 times daily as needed for Wheezing, Disp: 18 g, Rfl: 0    magnesium citrate solution, Take 296 mLs by mouth once for 1 dose, Disp: 296 mL, Rfl:

## 2024-07-09 ENCOUNTER — OFFICE VISIT (OUTPATIENT)
Dept: PULMONOLOGY | Age: 74
End: 2024-07-09
Payer: MEDICARE

## 2024-07-09 VITALS
HEIGHT: 69 IN | WEIGHT: 229.6 LBS | OXYGEN SATURATION: 96 % | HEART RATE: 72 BPM | SYSTOLIC BLOOD PRESSURE: 128 MMHG | TEMPERATURE: 97.8 F | RESPIRATION RATE: 18 BRPM | DIASTOLIC BLOOD PRESSURE: 73 MMHG | BODY MASS INDEX: 34 KG/M2

## 2024-07-09 DIAGNOSIS — J45.990 EXERCISE-INDUCED ASTHMA: ICD-10-CM

## 2024-07-09 DIAGNOSIS — J32.9 RHINOSINUSITIS: ICD-10-CM

## 2024-07-09 DIAGNOSIS — J45.909 MILD REACTIVE AIRWAYS DISEASE, UNSPECIFIED WHETHER PERSISTENT: Primary | ICD-10-CM

## 2024-07-09 PROCEDURE — 1123F ACP DISCUSS/DSCN MKR DOCD: CPT | Performed by: INTERNAL MEDICINE

## 2024-07-09 PROCEDURE — 3017F COLORECTAL CA SCREEN DOC REV: CPT | Performed by: INTERNAL MEDICINE

## 2024-07-09 PROCEDURE — G8427 DOCREV CUR MEDS BY ELIG CLIN: HCPCS | Performed by: INTERNAL MEDICINE

## 2024-07-09 PROCEDURE — G8417 CALC BMI ABV UP PARAM F/U: HCPCS | Performed by: INTERNAL MEDICINE

## 2024-07-09 PROCEDURE — 1036F TOBACCO NON-USER: CPT | Performed by: INTERNAL MEDICINE

## 2024-07-09 PROCEDURE — 99214 OFFICE O/P EST MOD 30 MIN: CPT | Performed by: INTERNAL MEDICINE

## 2024-07-09 RX ORDER — MONTELUKAST SODIUM 10 MG/1
10 TABLET ORAL DAILY
Qty: 90 TABLET | Refills: 3 | Status: SHIPPED | OUTPATIENT
Start: 2024-07-09

## 2024-07-09 NOTE — PROGRESS NOTES
Chief complaint  This is a 74 y.o. year old male  who comes to see me with a chief complaint of   Chief Complaint   Patient presents with    Follow-up     Reactive airways disease       HPI  Here with a cc of asthma      He is better than last time.  Back to using albuterol rarely.  Has not used albuterol prior to exercise and does get a little SOB and/or tightness when walking.  Remains on singulair which seemed to have helped as he is less reliant on benadryl.  Might feel a little foggy from the singulair as he takes it during the day and not at night      Current Outpatient Medications:     montelukast (SINGULAIR) 10 MG tablet, Take 1 tablet by mouth daily, Disp: 90 tablet, Rfl: 3    predniSONE (DELTASONE) 10 MG tablet, 40mg for 3days 30mg for 3days 20mg for 3days 10mg for 3days, Disp: 30 tablet, Rfl: 0    montelukast (SINGULAIR) 10 MG tablet, Take 1 tablet by mouth daily, Disp: 90 tablet, Rfl: 1    albuterol sulfate HFA (PROAIR HFA) 108 (90 Base) MCG/ACT inhaler, Inhale 2 puffs into the lungs every 4 hours as needed for Wheezing or Shortness of Breath, Disp: 18 g, Rfl: 5    azelastine (ASTELIN) 0.1 % nasal spray, 2 sprays by Nasal route 2 times daily Use in each nostril as directed, Disp: 30 mL, Rfl: 0    B Complex Vitamins (VITAMIN B-COMPLEX PO), Take by mouth, Disp: , Rfl:     docusate sodium (COLACE) 100 MG capsule, Take 1 capsule by mouth 2 times daily, Disp: , Rfl:     Probiotic Product (PROBIOTIC PO), Take by mouth, Disp: , Rfl:     metroNIDAZOLE (METROGEL) 0.75 % gel, Apply topically  daily., Disp: 45 g, Rfl: 0    albuterol sulfate HFA (VENTOLIN HFA) 108 (90 Base) MCG/ACT inhaler, Inhale 2 puffs into the lungs 4 times daily as needed for Wheezing, Disp: 18 g, Rfl: 0    Magnesium 100 MG CAPS, Take 400 mg by mouth daily , Disp: , Rfl:     vitamin C (ASCORBIC ACID) 500 MG tablet, Take 2 tablets by mouth daily, Disp: , Rfl:     Multiple Vitamins-Minerals (THERAPEUTIC MULTIVITAMIN-MINERALS) tablet, Take 1

## 2024-10-17 ENCOUNTER — OFFICE VISIT (OUTPATIENT)
Dept: FAMILY MEDICINE CLINIC | Age: 74
End: 2024-10-17

## 2024-10-17 ENCOUNTER — OFFICE VISIT (OUTPATIENT)
Dept: FAMILY MEDICINE CLINIC | Age: 74
End: 2024-10-17
Payer: MEDICARE

## 2024-10-17 VITALS
SYSTOLIC BLOOD PRESSURE: 130 MMHG | TEMPERATURE: 97.5 F | DIASTOLIC BLOOD PRESSURE: 82 MMHG | BODY MASS INDEX: 33.33 KG/M2 | HEART RATE: 72 BPM | WEIGHT: 225 LBS | HEIGHT: 69 IN

## 2024-10-17 DIAGNOSIS — K21.9 GASTROESOPHAGEAL REFLUX DISEASE, UNSPECIFIED WHETHER ESOPHAGITIS PRESENT: ICD-10-CM

## 2024-10-17 DIAGNOSIS — Z12.5 PROSTATE CANCER SCREENING: ICD-10-CM

## 2024-10-17 DIAGNOSIS — E78.2 MIXED HYPERLIPIDEMIA: Primary | ICD-10-CM

## 2024-10-17 DIAGNOSIS — Z00.00 MEDICARE ANNUAL WELLNESS VISIT, SUBSEQUENT: Primary | ICD-10-CM

## 2024-10-17 PROCEDURE — 1123F ACP DISCUSS/DSCN MKR DOCD: CPT | Performed by: FAMILY MEDICINE

## 2024-10-17 PROCEDURE — G0439 PPPS, SUBSEQ VISIT: HCPCS | Performed by: FAMILY MEDICINE

## 2024-10-17 PROCEDURE — 3017F COLORECTAL CA SCREEN DOC REV: CPT | Performed by: FAMILY MEDICINE

## 2024-10-17 PROCEDURE — G8484 FLU IMMUNIZE NO ADMIN: HCPCS | Performed by: FAMILY MEDICINE

## 2024-10-17 RX ORDER — FAMOTIDINE 20 MG/1
20 TABLET, FILM COATED ORAL 2 TIMES DAILY
Qty: 60 TABLET | Refills: 3 | Status: SHIPPED | OUTPATIENT
Start: 2024-10-17

## 2024-10-17 RX ORDER — AZELASTINE 1 MG/ML
2 SPRAY, METERED NASAL 2 TIMES DAILY
Qty: 30 ML | Refills: 0 | Status: SHIPPED | OUTPATIENT
Start: 2024-10-17

## 2024-10-17 SDOH — ECONOMIC STABILITY: FOOD INSECURITY: WITHIN THE PAST 12 MONTHS, THE FOOD YOU BOUGHT JUST DIDN'T LAST AND YOU DIDN'T HAVE MONEY TO GET MORE.: NEVER TRUE

## 2024-10-17 SDOH — ECONOMIC STABILITY: INCOME INSECURITY: HOW HARD IS IT FOR YOU TO PAY FOR THE VERY BASICS LIKE FOOD, HOUSING, MEDICAL CARE, AND HEATING?: NOT HARD AT ALL

## 2024-10-17 SDOH — ECONOMIC STABILITY: FOOD INSECURITY: WITHIN THE PAST 12 MONTHS, YOU WORRIED THAT YOUR FOOD WOULD RUN OUT BEFORE YOU GOT MONEY TO BUY MORE.: NEVER TRUE

## 2024-10-17 ASSESSMENT — PATIENT HEALTH QUESTIONNAIRE - PHQ9
SUM OF ALL RESPONSES TO PHQ QUESTIONS 1-9: 0
SUM OF ALL RESPONSES TO PHQ9 QUESTIONS 1 & 2: 0
2. FEELING DOWN, DEPRESSED OR HOPELESS: NOT AT ALL
1. LITTLE INTEREST OR PLEASURE IN DOING THINGS: NOT AT ALL
SUM OF ALL RESPONSES TO PHQ QUESTIONS 1-9: 0

## 2024-10-17 ASSESSMENT — LIFESTYLE VARIABLES
HOW OFTEN DO YOU HAVE A DRINK CONTAINING ALCOHOL: MONTHLY OR LESS
HOW MANY STANDARD DRINKS CONTAINING ALCOHOL DO YOU HAVE ON A TYPICAL DAY: 1 OR 2

## 2024-10-17 ASSESSMENT — ENCOUNTER SYMPTOMS
VOMITING: 0
DIARRHEA: 0
BLOOD IN STOOL: 0
ABDOMINAL PAIN: 0
NAUSEA: 0
SHORTNESS OF BREATH: 0
CONSTIPATION: 0
CHEST TIGHTNESS: 0
ABDOMINAL DISTENTION: 0

## 2024-10-17 NOTE — PROGRESS NOTES
Subjective:      Patient ID: Wm Tariq is a 74 y.o. male.    Chief Complaint   Patient presents with    Check-Up     Lipids         Patient presents with:  Check-Up: Lipids     Here for the above   He is overall well     Meds noted     YOB: 1950    Date of Visit:  10/17/2024     -- Penicillins -- Hives    Current Outpatient Medications:  montelukast (SINGULAIR) 10 MG tablet, Take 1 tablet by mouth daily, Disp: 90 tablet, Rfl: 3  albuterol sulfate HFA (PROAIR HFA) 108 (90 Base) MCG/ACT inhaler, Inhale 2 puffs into the lungs every 4 hours as needed for Wheezing or Shortness of Breath, Disp: 18 g, Rfl: 5  azelastine (ASTELIN) 0.1 % nasal spray, 2 sprays by Nasal route 2 times daily Use in each nostril as directed, Disp: 30 mL, Rfl: 0  B Complex Vitamins (VITAMIN B-COMPLEX PO), Take by mouth, Disp: , Rfl:   docusate sodium (COLACE) 100 MG capsule, Take 1 capsule by mouth 2 times daily, Disp: , Rfl:   Probiotic Product (PROBIOTIC PO), Take by mouth, Disp: , Rfl:   metroNIDAZOLE (METROGEL) 0.75 % gel, Apply topically  daily., Disp: 45 g, Rfl: 0  albuterol sulfate HFA (VENTOLIN HFA) 108 (90 Base) MCG/ACT inhaler, Inhale 2 puffs into the lungs 4 times daily as needed for Wheezing, Disp: 18 g, Rfl: 0  Magnesium 100 MG CAPS, Take 400 mg by mouth daily , Disp: , Rfl:   vitamin C (ASCORBIC ACID) 500 MG tablet, Take 2 tablets by mouth daily, Disp: , Rfl:   Multiple Vitamins-Minerals (THERAPEUTIC MULTIVITAMIN-MINERALS) tablet, Take 1 tablet by mouth daily, Disp: , Rfl:   glucosamine-chondroitin 500-400 MG CAPS, Take by mouth daily , Disp: , Rfl:      No current facility-administered medications for this visit.      ---------------------------               10/17/24                      1321         ---------------------------   BP:          130/82         Site:    Left Upper Arm     Position:     Sitting        Cuff Size:   Large Adult      Pulse:         72           Temp:   97.5

## 2024-10-17 NOTE — PROGRESS NOTES
Medicare Annual Wellness Visit    Wm Tariq is here for Medicare AWV    Assessment & Plan   Medicare annual wellness visit, subsequent  Recommendations for Preventive Services Due: see orders and patient instructions/AVS.  Recommended screening schedule for the next 5-10 years is provided to the patient in written form: see Patient Instructions/AVS.     Return in 1 year (on 10/17/2025) for Medicare AWV.     Subjective   Medicare AWV    Patient's complete Health Risk Assessment and screening values have been reviewed and are found in Flowsheets. The following problems were reviewed today and where indicated follow up appointments were made and/or referrals ordered.    Positive Risk Factor Screenings with Interventions:                  Abnormal BMI (obese):  There is no height or weight on file to calculate BMI. (!) Abnormal  Interventions:  See AVS for additional education material                           Objective   There were no vitals filed for this visit.   There is no height or weight on file to calculate BMI.                    Allergies   Allergen Reactions    Penicillins Hives     Prior to Visit Medications    Medication Sig Taking? Authorizing Provider   azelastine (ASTELIN) 0.1 % nasal spray 2 sprays by Nasal route 2 times daily Use in each nostril as directed Yes Aidan Skelton MD   famotidine (PEPCID) 20 MG tablet Take 1 tablet by mouth 2 times daily Yes Aidan Skelton MD   montelukast (SINGULAIR) 10 MG tablet Take 1 tablet by mouth daily Yes Gurdeep Adams,    B Complex Vitamins (VITAMIN B-COMPLEX PO) Take by mouth Yes Naty Hawkins MD   docusate sodium (COLACE) 100 MG capsule Take 1 capsule by mouth 2 times daily Yes Naty Hawkins MD   Probiotic Product (PROBIOTIC PO) Take by mouth Yes Naty Hawkins MD   metroNIDAZOLE (METROGEL) 0.75 % gel Apply topically  daily. Yes Aidan Skelton MD   albuterol sulfate HFA (VENTOLIN HFA) 108 (90 Base) MCG/ACT inhaler Inhale 2

## 2024-10-17 NOTE — PATIENT INSTRUCTIONS
See dr bo for the reflux that you have been having   Do check fasting blood   See back as needed and in 6 months

## 2024-10-18 DIAGNOSIS — E78.2 MIXED HYPERLIPIDEMIA: ICD-10-CM

## 2024-10-18 DIAGNOSIS — K21.9 GASTROESOPHAGEAL REFLUX DISEASE, UNSPECIFIED WHETHER ESOPHAGITIS PRESENT: ICD-10-CM

## 2024-10-18 DIAGNOSIS — Z12.5 PROSTATE CANCER SCREENING: ICD-10-CM

## 2024-10-18 LAB
ALBUMIN SERPL-MCNC: 4 G/DL (ref 3.4–5)
ALBUMIN/GLOB SERPL: 1.9 {RATIO} (ref 1.1–2.2)
ALP SERPL-CCNC: 80 U/L (ref 40–129)
ALT SERPL-CCNC: 23 U/L (ref 10–40)
ANION GAP SERPL CALCULATED.3IONS-SCNC: 6 MMOL/L (ref 3–16)
AST SERPL-CCNC: 19 U/L (ref 15–37)
BASOPHILS # BLD: 0 K/UL (ref 0–0.2)
BASOPHILS NFR BLD: 0.6 %
BILIRUB SERPL-MCNC: 0.7 MG/DL (ref 0–1)
BUN SERPL-MCNC: 22 MG/DL (ref 7–20)
CALCIUM SERPL-MCNC: 9.5 MG/DL (ref 8.3–10.6)
CHLORIDE SERPL-SCNC: 111 MMOL/L (ref 99–110)
CHOLEST SERPL-MCNC: 143 MG/DL (ref 0–199)
CO2 SERPL-SCNC: 27 MMOL/L (ref 21–32)
CREAT SERPL-MCNC: 1 MG/DL (ref 0.8–1.3)
DEPRECATED RDW RBC AUTO: 13.5 % (ref 12.4–15.4)
EOSINOPHIL # BLD: 0.4 K/UL (ref 0–0.6)
EOSINOPHIL NFR BLD: 7 %
GFR SERPLBLD CREATININE-BSD FMLA CKD-EPI: 79 ML/MIN/{1.73_M2}
GLUCOSE SERPL-MCNC: 99 MG/DL (ref 70–99)
HCT VFR BLD AUTO: 48.1 % (ref 40.5–52.5)
HDLC SERPL-MCNC: 46 MG/DL (ref 40–60)
HGB BLD-MCNC: 16 G/DL (ref 13.5–17.5)
LDLC SERPL CALC-MCNC: 79 MG/DL
LYMPHOCYTES # BLD: 1.3 K/UL (ref 1–5.1)
LYMPHOCYTES NFR BLD: 21.2 %
MCH RBC QN AUTO: 31.2 PG (ref 26–34)
MCHC RBC AUTO-ENTMCNC: 33.2 G/DL (ref 31–36)
MCV RBC AUTO: 93.8 FL (ref 80–100)
MONOCYTES # BLD: 0.5 K/UL (ref 0–1.3)
MONOCYTES NFR BLD: 7.6 %
NEUTROPHILS # BLD: 3.8 K/UL (ref 1.7–7.7)
NEUTROPHILS NFR BLD: 63.6 %
PLATELET # BLD AUTO: 136 K/UL (ref 135–450)
PMV BLD AUTO: 10.9 FL (ref 5–10.5)
POTASSIUM SERPL-SCNC: 4.4 MMOL/L (ref 3.5–5.1)
PROT SERPL-MCNC: 6.1 G/DL (ref 6.4–8.2)
PSA SERPL DL<=0.01 NG/ML-MCNC: 3.16 NG/ML (ref 0–4)
RBC # BLD AUTO: 5.13 M/UL (ref 4.2–5.9)
SODIUM SERPL-SCNC: 144 MMOL/L (ref 136–145)
TRIGL SERPL-MCNC: 89 MG/DL (ref 0–150)
VLDLC SERPL CALC-MCNC: 18 MG/DL
WBC # BLD AUTO: 6 K/UL (ref 4–11)

## 2025-01-28 ENCOUNTER — E-VISIT (OUTPATIENT)
Dept: FAMILY MEDICINE CLINIC | Age: 75
End: 2025-01-28
Payer: MEDICARE

## 2025-01-28 DIAGNOSIS — J06.9 ACUTE UPPER RESPIRATORY INFECTION, UNSPECIFIED: ICD-10-CM

## 2025-01-28 DIAGNOSIS — R09.81 SINUS CONGESTION: Primary | ICD-10-CM

## 2025-01-28 PROCEDURE — 99421 OL DIG E/M SVC 5-10 MIN: CPT | Performed by: FAMILY MEDICINE

## 2025-01-28 RX ORDER — AZITHROMYCIN 250 MG/1
TABLET, FILM COATED ORAL
Qty: 1 PACKET | Refills: 0 | Status: SHIPPED | OUTPATIENT
Start: 2025-01-28 | End: 2025-02-07

## 2025-01-28 ASSESSMENT — LIFESTYLE VARIABLES: SMOKING_STATUS: NO, I'VE NEVER SMOKED

## 2025-01-28 NOTE — PROGRESS NOTES
Called and spoke to patient  1-2 weeks nasal congestion no fever. Some facial pain   [facility]     Diagnosis Orders   1. Sinus congestion        2. Acute upper respiratory infection, unspecified            Orders Placed This Encounter   Medications    azithromycin (ZITHROMAX) 250 MG tablet     Sig: Take 2 tabs (500 mg) on Day 1, and take 1 tab (250 mg) on days 2 through 5.     Dispense:  1 packet     Refill:  0       5 min

## 2025-02-07 ENCOUNTER — TELEPHONE (OUTPATIENT)
Dept: FAMILY MEDICINE CLINIC | Age: 75
End: 2025-02-07

## 2025-02-07 ENCOUNTER — HOSPITAL ENCOUNTER (OUTPATIENT)
Dept: VASCULAR LAB | Age: 75
Discharge: HOME | End: 2025-02-09
Payer: MEDICARE

## 2025-02-07 DIAGNOSIS — M79.605 PAIN OF LEFT LOWER EXTREMITY: ICD-10-CM

## 2025-02-07 DIAGNOSIS — M79.605 PAIN OF LEFT LOWER EXTREMITY: Primary | ICD-10-CM

## 2025-02-07 PROCEDURE — 93971 EXTREMITY STUDY: CPT

## 2025-02-07 NOTE — TELEPHONE ENCOUNTER
Roosevelt is scheduled for today at [facility] 3:30 pm.   Patient is aware of date, time and location.

## 2025-02-07 NOTE — TELEPHONE ENCOUNTER
Patient called in complaining of woke up with Carmine horse several days ago in his left leg. The muscles have not relaxed and he is having issues walking. Dr. Skelton has previously referred the patient for therapy for his muscles in the past.     Patient is wanting to know if he needs to go somewhere and be seen or can he be send to therapy for this issue?     Please advise.

## 2025-02-07 NOTE — TELEPHONE ENCOUNTER
Let us check for clot   today     Diagnosis Orders   1. Pain of left lower extremity  Vascular duplex lower extremity venous left

## 2025-02-07 NOTE — TELEPHONE ENCOUNTER
Spoke with Roosevelt - will schedule [facility] for today.  [phone] - Central Scheduling - tried to schedule either with [facility] or Alfredo. She will return call once she hears back from a dept.

## 2025-02-09 ENCOUNTER — TELEPHONE (OUTPATIENT)
Dept: FAMILY MEDICINE CLINIC | Age: 75
End: 2025-02-09

## 2025-02-09 DIAGNOSIS — M79.605 PAIN OF LEFT LOWER EXTREMITY: Primary | ICD-10-CM

## 2025-02-09 NOTE — TELEPHONE ENCOUNTER
Called patient. The scan is ok   [facility]  He has no back pain. No injury  Some cramp and heat helps   He was using heat and it helped  He wanted to see pt for the leg discomfort   Orders Placed This Encounter   Medications    tiZANidine (ZANAFLEX) 4 MG tablet     Sig: Take 1 tablet by mouth every 8 hours as needed (leg pain)     Dispense:  30 tablet     Refill:  0       Needs referral to physical therapy

## 2025-02-10 PROCEDURE — 93971 EXTREMITY STUDY: CPT | Performed by: INTERNAL MEDICINE

## 2025-02-11 ENCOUNTER — OFFICE VISIT (OUTPATIENT)
Dept: PULMONOLOGY | Age: 75
End: 2025-02-11
Payer: MEDICARE

## 2025-02-11 VITALS
RESPIRATION RATE: 18 BRPM | HEIGHT: 69 IN | DIASTOLIC BLOOD PRESSURE: 68 MMHG | TEMPERATURE: 97.5 F | SYSTOLIC BLOOD PRESSURE: 117 MMHG | BODY MASS INDEX: 32.94 KG/M2 | WEIGHT: 222.4 LBS | HEART RATE: 73 BPM | OXYGEN SATURATION: 96 %

## 2025-02-11 DIAGNOSIS — J45.990 EXERCISE-INDUCED ASTHMA: ICD-10-CM

## 2025-02-11 DIAGNOSIS — J32.9 RHINOSINUSITIS: ICD-10-CM

## 2025-02-11 DIAGNOSIS — J45.909 MILD REACTIVE AIRWAYS DISEASE, UNSPECIFIED WHETHER PERSISTENT: Primary | ICD-10-CM

## 2025-02-11 PROCEDURE — 1159F MED LIST DOCD IN RCRD: CPT | Performed by: INTERNAL MEDICINE

## 2025-02-11 PROCEDURE — G8427 DOCREV CUR MEDS BY ELIG CLIN: HCPCS | Performed by: INTERNAL MEDICINE

## 2025-02-11 PROCEDURE — G2211 COMPLEX E/M VISIT ADD ON: HCPCS | Performed by: INTERNAL MEDICINE

## 2025-02-11 PROCEDURE — 1036F TOBACCO NON-USER: CPT | Performed by: INTERNAL MEDICINE

## 2025-02-11 PROCEDURE — 99214 OFFICE O/P EST MOD 30 MIN: CPT | Performed by: INTERNAL MEDICINE

## 2025-02-11 PROCEDURE — G8417 CALC BMI ABV UP PARAM F/U: HCPCS | Performed by: INTERNAL MEDICINE

## 2025-02-11 PROCEDURE — 3017F COLORECTAL CA SCREEN DOC REV: CPT | Performed by: INTERNAL MEDICINE

## 2025-02-11 PROCEDURE — 1123F ACP DISCUSS/DSCN MKR DOCD: CPT | Performed by: INTERNAL MEDICINE

## 2025-02-11 NOTE — PROGRESS NOTES
Chief complaint  This is a 74 y.o. year old male  who comes to see me with a chief complaint of   Chief Complaint   Patient presents with    Follow-up    Asthma       HPI  Here with a cc of asthma    He seems to be doing ok.  Has not always been using the albuterol prior to exercise but does notice a dry cough if he does not use it.  On singulair at hs       Current Outpatient Medications:     tiZANidine (ZANAFLEX) 4 MG tablet, Take 1 tablet by mouth every 8 hours as needed (leg pain), Disp: 30 tablet, Rfl: 0    azelastine (ASTELIN) 0.1 % nasal spray, 2 sprays by Nasal route 2 times daily Use in each nostril as directed, Disp: 30 mL, Rfl: 0    famotidine (PEPCID) 20 MG tablet, Take 1 tablet by mouth 2 times daily, Disp: 60 tablet, Rfl: 3    montelukast (SINGULAIR) 10 MG tablet, Take 1 tablet by mouth daily, Disp: 90 tablet, Rfl: 3    albuterol sulfate HFA (PROAIR HFA) 108 (90 Base) MCG/ACT inhaler, Inhale 2 puffs into the lungs every 4 hours as needed for Wheezing or Shortness of Breath, Disp: 18 g, Rfl: 5    B Complex Vitamins (VITAMIN B-COMPLEX PO), Take by mouth, Disp: , Rfl:     docusate sodium (COLACE) 100 MG capsule, Take 1 capsule by mouth 2 times daily, Disp: , Rfl:     Probiotic Product (PROBIOTIC PO), Take by mouth, Disp: , Rfl:     metroNIDAZOLE (METROGEL) 0.75 % gel, Apply topically  daily., Disp: 45 g, Rfl: 0    albuterol sulfate HFA (VENTOLIN HFA) 108 (90 Base) MCG/ACT inhaler, Inhale 2 puffs into the lungs 4 times daily as needed for Wheezing, Disp: 18 g, Rfl: 0    Magnesium 100 MG CAPS, Take 400 mg by mouth daily , Disp: , Rfl:     vitamin C (ASCORBIC ACID) 500 MG tablet, Take 2 tablets by mouth daily, Disp: , Rfl:     Multiple Vitamins-Minerals (THERAPEUTIC MULTIVITAMIN-MINERALS) tablet, Take 1 tablet by mouth daily, Disp: , Rfl:     glucosamine-chondroitin 500-400 MG CAPS, Take by mouth daily , Disp: , Rfl:       PHYSICAL EXAM:  Vitals:    02/11/25 0951   BP: 117/68   Pulse: 73   Resp: 18   Temp:

## 2025-02-12 ENCOUNTER — HOSPITAL ENCOUNTER (OUTPATIENT)
Dept: PHYSICAL THERAPY | Age: 75
Setting detail: THERAPIES SERIES
Discharge: HOME | End: 2025-02-12
Attending: FAMILY MEDICINE
Payer: MEDICARE

## 2025-02-12 DIAGNOSIS — Z74.09 IMPAIRED FUNCTIONAL MOBILITY AND ACTIVITY TOLERANCE: Primary | ICD-10-CM

## 2025-02-12 PROCEDURE — 97110 THERAPEUTIC EXERCISES: CPT

## 2025-02-12 PROCEDURE — 97161 PT EVAL LOW COMPLEX 20 MIN: CPT

## 2025-02-12 NOTE — PLAN OF CARE
[facility] - Outpatient Rehabilitation and Therapy: [address] [place] [address] office: [phone] fax: [phone]     Physical Therapy Initial Evaluation Certification      Dear Aidan Skelton MD ,    We had the pleasure of evaluating the following patient for physical therapy services at [facility].  A summary of our findings can be found in the initial assessment below.  This includes our plan of care.  If you have any questions or concerns regarding these findings, please do not hesitate to contact me at the office phone number listed above.  Thank you for the referral.     Physician Signature:_______________________________Date:__________________  By signing above (or electronic signature), therapist’s plan is approved by physician       Physical Therapy: TREATMENT/PROGRESS NOTE   Patient: Wm Tariq (74 y.o. male)   Examination Date: 2025   :  1950 MRN: [id number]   Visit #: 1   Insurance Allowable Auth Needed   mn []Yes    [x]No    Insurance: Payor: MEDICARE / Plan: MEDICARE PART A AND B / Product Type: *No Product type* /   Insurance ID: [id number] - (Medicare)  Secondary Insurance (if applicable): MEDICAL M[employer]UAL   Treatment Diagnosis:     ICD-10-CM    1. Impaired functional mobility and activity tolerance  Z74.09          Medical Diagnosis:  Pain of left lower extremity [M79.605]   Referring Physician: Aidan Skelton MD  PCP: Aidan Skelton MD     Plan of care signed (Y/N):     Date of Patient follow up with Physician:      Plan of Care Report: EVAL today  POC update due: (10 visits /OR AUTH LIMITS, whichever is less)  3/14/2025                                             Medical History:  Comorbidities:  asthma, hernia repair, vertigo  Relevant Medical History: asthma, hernia repair, vertigo                                         Precautions/ Contra-indications:           Latex allergy:  NO  Pacemaker:    NO  Contraindications for

## 2025-02-16 RX ORDER — FAMOTIDINE 20 MG/1
20 TABLET, FILM COATED ORAL 2 TIMES DAILY
Qty: 180 TABLET | Refills: 1 | Status: SHIPPED | OUTPATIENT
Start: 2025-02-16

## 2025-02-18 ENCOUNTER — HOSPITAL ENCOUNTER (OUTPATIENT)
Dept: PHYSICAL THERAPY | Age: 75
Setting detail: THERAPIES SERIES
Discharge: HOME | End: 2025-02-18
Attending: FAMILY MEDICINE
Payer: MEDICARE

## 2025-02-18 PROCEDURE — 97140 MANUAL THERAPY 1/> REGIONS: CPT

## 2025-02-18 PROCEDURE — 97110 THERAPEUTIC EXERCISES: CPT

## 2025-02-18 NOTE — FLOWSHEET NOTE
[facility] - Outpatient Rehabilitation and Therapy: [address], [place] [address] office: [phone] fax: [phone]         Physical Therapy: TREATMENT/PROGRESS NOTE   Patient: Wm Tariq (75 y.o. male)   Examination Date: 2025   :  1950 MRN: [id number]   Visit #: 2   Insurance Allowable Auth Needed   mn []Yes    [x]No    Insurance: Payor: MEDICARE / Plan: MEDICARE PART A AND B / Product Type: *No Product type* /   Insurance ID: [id number] - (Medicare)  Secondary Insurance (if applicable): MEDICAL MUTUAL   Treatment Diagnosis:     ICD-10-CM    1. Impaired functional mobility and activity tolerance  Z74.09          Medical Diagnosis:  Pain of left lower extremity [M79.605]   Referring Physician: Aidan Skelton MD  PCP: Aidan Skelton MD     Plan of care signed (Y/N): Y    Date of Patient follow up with Physician:      Plan of Care Report: EVAL today  POC update due: (10 visits /OR AUTH LIMITS, whichever is less)  3/14/2025                                             Medical History:  Comorbidities:  asthma, hernia repair, vertigo  Relevant Medical History: asthma, hernia repair, vertigo                                         Precautions/ Contra-indications:           Latex allergy:  NO  Pacemaker:    NO  Contraindications for Manipulation: None  Date of Surgery: na  Other:    Red Flags:  None    Suicide Screening:   The patient did not verbalize a primary behavioral concern, suicidal ideation, suicidal intent, or demonstrate suicidal behaviors.    Preferred Language for Healthcare:   [x] English       [] other:    SUBJECTIVE EXAMINATION     Patient stated complaint: pt stated he woke with a Carmine horse on 25.  Pt has muscle tightness \"from the hip all the way down.\" Pt pointed to posterior hip and posterolateral thigh and calf, and bottom of the L foot.  Pt has intermittent L LE tingling at the L dorsal foot, occurring for \"5 minutes at a time.\"  Pt has difficulty

## 2025-02-20 ENCOUNTER — HOSPITAL ENCOUNTER (OUTPATIENT)
Dept: PHYSICAL THERAPY | Age: 75
Setting detail: THERAPIES SERIES
Discharge: HOME | End: 2025-02-20
Attending: FAMILY MEDICINE
Payer: MEDICARE

## 2025-02-20 PROCEDURE — 97140 MANUAL THERAPY 1/> REGIONS: CPT

## 2025-02-20 PROCEDURE — 97110 THERAPEUTIC EXERCISES: CPT

## 2025-02-20 NOTE — FLOWSHEET NOTE
[facility] - Outpatient Rehabilitation and Therapy: [address], [place] [address] office: [phone] fax: [phone]         Physical Therapy: TREATMENT/PROGRESS NOTE   Patient: Wm Tariq (75 y.o. male)   Examination Date: 2025   :  1950 MRN: [id number]   Visit #: 3   Insurance Allowable Auth Needed   mn []Yes    [x]No    Insurance: Payor: MEDICARE / Plan: MEDICARE PART A AND B / Product Type: *No Product type* /   Insurance ID: [id number] - (Medicare)  Secondary Insurance (if applicable): MEDICAL MUTUAL   Treatment Diagnosis:     ICD-10-CM    1. Impaired functional mobility and activity tolerance  Z74.09          Medical Diagnosis:  Pain of left lower extremity [M79.605]   Referring Physician: Aidan Skelton MD  PCP: Aidan Skelton MD     Plan of care signed (Y/N): Y    Date of Patient follow up with Physician:      Plan of Care Report: NO  POC update due: (10 visits /OR AUTH LIMITS, whichever is less)  3/14/2025                                             Medical History:  Comorbidities:  asthma, hernia repair, vertigo  Relevant Medical History: asthma, hernia repair, vertigo                                         Precautions/ Contra-indications:           Latex allergy:  NO  Pacemaker:    NO  Contraindications for Manipulation: None  Date of Surgery: na  Other:    Red Flags:  None    Suicide Screening:   The patient did not verbalize a primary behavioral concern, suicidal ideation, suicidal intent, or demonstrate suicidal behaviors.    Preferred Language for Healthcare:   [x] English       [] other:    SUBJECTIVE EXAMINATION     Patient stated complaint: pt stated he woke with a Carmine horse on 25.  Pt has muscle tightness \"from the hip all the way down.\" Pt pointed to posterior hip and posterolateral thigh and calf, and bottom of the L foot.  Pt has intermittent L LE tingling at the L dorsal foot, occurring for \"5 minutes at a time.\"  Pt has difficulty walking,

## 2025-02-25 ENCOUNTER — HOSPITAL ENCOUNTER (OUTPATIENT)
Dept: PHYSICAL THERAPY | Age: 75
Setting detail: THERAPIES SERIES
Discharge: HOME | End: 2025-02-25
Attending: FAMILY MEDICINE
Payer: MEDICARE

## 2025-02-25 PROCEDURE — 97140 MANUAL THERAPY 1/> REGIONS: CPT

## 2025-02-25 PROCEDURE — 97110 THERAPEUTIC EXERCISES: CPT

## 2025-02-25 PROCEDURE — 97112 NEUROMUSCULAR REEDUCATION: CPT

## 2025-02-25 NOTE — FLOWSHEET NOTE
[facility] - Outpatient Rehabilitation and Therapy: [address], [place] [address] office: [phone] fax: [phone]         Physical Therapy: TREATMENT/PROGRESS NOTE   Patient: Wm Tariq (75 y.o. male)   Examination Date: 2025   :  1950 MRN: [id number]   Visit #: 4   Insurance Allowable Auth Needed   mn []Yes    [x]No    Insurance: Payor: MEDICARE / Plan: MEDICARE PART A AND B / Product Type: *No Product type* /   Insurance ID: [id number] - (Medicare)  Secondary Insurance (if applicable): MEDICAL MUTUAL   Treatment Diagnosis:     ICD-10-CM    1. Impaired functional mobility and activity tolerance  Z74.09          Medical Diagnosis:  Pain of left lower extremity [M79.605]   Referring Physician: Aidan Skelton MD  PCP: Aidan Skelton MD     Plan of care signed (Y/N): Y    Date of Patient follow up with Physician:      Plan of Care Report: NO  POC update due: (10 visits /OR AUTH LIMITS, whichever is less)  3/14/2025                                             Medical History:  Comorbidities:  asthma, hernia repair, vertigo  Relevant Medical History: asthma, hernia repair, vertigo                                         Precautions/ Contra-indications:           Latex allergy:  NO  Pacemaker:    NO  Contraindications for Manipulation: None  Date of Surgery: na  Other:    Red Flags:  None    Suicide Screening:   The patient did not verbalize a primary behavioral concern, suicidal ideation, suicidal intent, or demonstrate suicidal behaviors.    Preferred Language for Healthcare:   [x] English       [] other:    SUBJECTIVE EXAMINATION     Patient stated complaint: pt stated he woke with a Carmine horse on 25.  Pt has muscle tightness \"from the hip all the way down.\" Pt pointed to posterior hip and posterolateral thigh and calf, and bottom of the L foot.  Pt has intermittent L LE tingling at the L dorsal foot, occurring for \"5 minutes at a time.\"  Pt has difficulty walking,  Quality 111:Pneumonia Vaccination Status For Older Adults: Pneumococcal Vaccination Previously Received Quality 130: Documentation Of Current Medications In The Medical Record: Current Medications with Name, Dosage, Frequency, or Route not Documented, Reason not Given Quality 431: Preventive Care And Screening: Unhealthy Alcohol Use - Screening: Patient not identified as an unhealthy alcohol user when screened for unhealthy alcohol use using a systematic screening method Quality 154 Part A: Falls: Risk Assessment (Should Be Reported With Measure 155.): Falls risk assessment completed and documented in the past 12 months. Quality 47: Advance Care Plan: Advance Care Planning discussed and documented in the medical record; patient did not wish or was not able to name a surrogate decision maker or provide an advance care plan. Detail Level: Detailed Quality 226: Preventive Care And Screening: Tobacco Use: Screening And Cessation Intervention: Patient screened for tobacco use and is an ex/non-smoker Quality 110: Preventive Care And Screening: Influenza Immunization: Influenza Immunization Administered during Influenza season Quality 154 Part B: Falls: Risk Screening (Should Be Reported With Measure 155.): Patient screened for future fall risk; documentation of no falls in the past year or only one fall without injury in the past year

## 2025-02-27 ENCOUNTER — HOSPITAL ENCOUNTER (OUTPATIENT)
Dept: PHYSICAL THERAPY | Age: 75
Setting detail: THERAPIES SERIES
Discharge: HOME | End: 2025-02-27
Attending: FAMILY MEDICINE
Payer: MEDICARE

## 2025-02-27 PROCEDURE — 97140 MANUAL THERAPY 1/> REGIONS: CPT

## 2025-02-27 PROCEDURE — 97112 NEUROMUSCULAR REEDUCATION: CPT

## 2025-02-27 PROCEDURE — 97110 THERAPEUTIC EXERCISES: CPT

## 2025-02-27 NOTE — PLAN OF CARE
Physical Therapy Re-Certification Plan of Care/MD UPDATE      Dear Dr. Aidan Skelton,    We had the pleasure of treating the following patient for physical therapy services at [facility].  A summary of our findings can be found in the updated assessment below.  This includes our plan of care.  If you have any questions or concerns regarding these findings, please do not hesitate to contact me at the office phone number checked above.  Thank you for the referral.     Physician Signature:________________________________Date:__________________  By signing above (or electronic signature), therapist’s plan is approved by physician    Date Range Of Visits: 25-25  Total Visits to Date: 5  Overall Response to Treatment:   []Patient is responding well to treatment and improvement is noted with regards  to goals   []Patient should continue to improve in reasonable time if they continue HEP   []Patient has plateaued and is no longer responding to skilled PT intervention    []Patient is getting worse and would benefit from return to referring MD   []Patient unable to adhere to initial POC   [x]Other: Pt stated his low back and hip have \"really loosened up.\"  Pt can \"walk a little straighter, get out of the car a little easier, I can put my socks on.  I'm just all around a little looser.  Feels like I got a bit to go.\"  Pt had improvements in hip and knee strength.  Pt has had improvements in transfers.  Pt had improvements in function per WOMAC (from 57% to 47% dysfunction).    Pt expressed interest in an x-ray for his lumbar spine.      Recommendation:    [x]Continue PT 1-2x / wk for 4 weeks.    []Hold PT, pending MD visit         [facility] - Outpatient Rehabilitation and Therapy: [address] [place] [address] office: [phone] fax: [phone]         Physical Therapy: TREATMENT/PROGRESS NOTE   Patient: Wm Tariq (75 y.o. male)   Examination Date: 2025   :

## 2025-02-28 ENCOUNTER — OFFICE VISIT (OUTPATIENT)
Dept: FAMILY MEDICINE CLINIC | Age: 75
End: 2025-02-28

## 2025-02-28 VITALS
TEMPERATURE: 98.1 F | HEIGHT: 69 IN | SYSTOLIC BLOOD PRESSURE: 128 MMHG | HEART RATE: 84 BPM | BODY MASS INDEX: 33.18 KG/M2 | WEIGHT: 224 LBS | DIASTOLIC BLOOD PRESSURE: 80 MMHG

## 2025-02-28 DIAGNOSIS — M79.605 PAIN OF LEFT LOWER EXTREMITY: ICD-10-CM

## 2025-02-28 DIAGNOSIS — R25.1 TREMOR: ICD-10-CM

## 2025-02-28 DIAGNOSIS — F41.9 ANXIETY: ICD-10-CM

## 2025-02-28 DIAGNOSIS — J02.9 SORE THROAT: Primary | ICD-10-CM

## 2025-02-28 RX ORDER — AZITHROMYCIN 250 MG/1
TABLET, FILM COATED ORAL
Qty: 1 PACKET | Refills: 0 | Status: SHIPPED | OUTPATIENT
Start: 2025-02-28 | End: 2025-03-10

## 2025-02-28 RX ORDER — BUSPIRONE HYDROCHLORIDE 10 MG/1
10 TABLET ORAL 2 TIMES DAILY
Qty: 60 TABLET | Refills: 1 | Status: SHIPPED | OUTPATIENT
Start: 2025-02-28

## 2025-02-28 SDOH — ECONOMIC STABILITY: FOOD INSECURITY: WITHIN THE PAST 12 MONTHS, THE FOOD YOU BOUGHT JUST DIDN'T LAST AND YOU DIDN'T HAVE MONEY TO GET MORE.: NEVER TRUE

## 2025-02-28 SDOH — ECONOMIC STABILITY: FOOD INSECURITY: WITHIN THE PAST 12 MONTHS, YOU WORRIED THAT YOUR FOOD WOULD RUN OUT BEFORE YOU GOT MONEY TO BUY MORE.: NEVER TRUE

## 2025-02-28 ASSESSMENT — PATIENT HEALTH QUESTIONNAIRE - PHQ9
SUM OF ALL RESPONSES TO PHQ QUESTIONS 1-9: 0
SUM OF ALL RESPONSES TO PHQ QUESTIONS 1-9: 0
SUM OF ALL RESPONSES TO PHQ9 QUESTIONS 1 & 2: 0
SUM OF ALL RESPONSES TO PHQ QUESTIONS 1-9: 0
SUM OF ALL RESPONSES TO PHQ QUESTIONS 1-9: 0
1. LITTLE INTEREST OR PLEASURE IN DOING THINGS: NOT AT ALL
2. FEELING DOWN, DEPRESSED OR HOPELESS: NOT AT ALL

## 2025-02-28 NOTE — PROGRESS NOTES
Subjective:      Patient ID: Wm Tariq is a 75 y.o. male.    Chief Complaint   Patient presents with    Anxiety    Pharyngitis     X 2 weeks    Leg Pain     Left leg pain x 2 weeks         Patient presents with:  Anxiety  Pharyngitis: X 2 weeks  Leg Pain: Left leg pain x 2 weeks     Sore throat for two week   No change in voice  No fever  No ear pain     Anxiety   For years and seem to be getting worse  Denies depressed   No med for same in past     Leg still bother him on the left leg stiff   No clot   Pt does help   No injury   Urine and bm fine no sx   Started first of the month     Tremor left hand at times for a year at rest when asked about same     YOB: 1950    Date of Visit:  2/28/2025     -- Penicillins -- Hives    Current Outpatient Medications:  famotidine (PEPCID) 20 MG tablet, TAKE 1 TABLET BY MOUTH TWICE A DAY, Disp: 180 tablet, Rfl: 1  tiZANidine (ZANAFLEX) 4 MG tablet, Take 1 tablet by mouth every 8 hours as needed (leg pain), Disp: 30 tablet, Rfl: 0  azelastine (ASTELIN) 0.1 % nasal spray, 2 sprays by Nasal route 2 times daily Use in each nostril as directed, Disp: 30 mL, Rfl: 0  montelukast (SINGULAIR) 10 MG tablet, Take 1 tablet by mouth daily, Disp: 90 tablet, Rfl: 3  albuterol sulfate HFA (PROAIR HFA) 108 (90 Base) MCG/ACT inhaler, Inhale 2 puffs into the lungs every 4 hours as needed for Wheezing or Shortness of Breath, Disp: 18 g, Rfl: 5  B Complex Vitamins (VITAMIN B-COMPLEX PO), Take by mouth, Disp: , Rfl:   docusate sodium (COLACE) 100 MG capsule, Take 1 capsule by mouth 2 times daily, Disp: , Rfl:   Probiotic Product (PROBIOTIC PO), Take by mouth, Disp: , Rfl:   metroNIDAZOLE (METROGEL) 0.75 % gel, Apply topically  daily., Disp: 45 g, Rfl: 0  albuterol sulfate HFA (VENTOLIN HFA) 108 (90 Base) MCG/ACT inhaler, Inhale 2 puffs into the lungs 4 times daily as needed for Wheezing, Disp: 18 g, Rfl: 0  Magnesium 100 MG CAPS, Take 400 mg by mouth daily , Disp: , Rfl:

## 2025-03-04 ENCOUNTER — HOSPITAL ENCOUNTER (OUTPATIENT)
Dept: PHYSICAL THERAPY | Age: 75
Setting detail: THERAPIES SERIES
Discharge: HOME | End: 2025-03-04
Attending: FAMILY MEDICINE
Payer: MEDICARE

## 2025-03-04 PROCEDURE — 97112 NEUROMUSCULAR REEDUCATION: CPT

## 2025-03-04 PROCEDURE — 97110 THERAPEUTIC EXERCISES: CPT

## 2025-03-04 PROCEDURE — 97140 MANUAL THERAPY 1/> REGIONS: CPT

## 2025-03-04 NOTE — FLOWSHEET NOTE
progress  [] Patient is not progressing as expected and requires additional follow up with physician  [] Other:     TREATMENT PLAN     Frequency/Duration: 1-2x/week for 8-10 weeks for the following treatment interventions:    Interventions:  Therapeutic Exercise (60365) including: strength training, ROM, and functional mobility  Therapeutic Activities (60174) including: functional mobility training and education.  Neuromuscular Re-education (82342) activation and proprioception, including postural re-education.    Manual Therapy (88321) as indicated to include: Passive Range of Motion, Gr I-IV mobilizations, Soft Tissue Mobilization, and Dry Needling/IASTM  Modalities as needed that may include: Electrical Stimulation and Thermal Agents  Patient education on postural re-education, activity modification, and progression of HEP    Plan: Cont POC- Continue emphasis/focus on exercise progression, modulating pain, and increasing ROM. Next visit plan to progress reps .       Electronically Signed by Dee Dee Wang PTA  Date: 03/04/2025     Note: Portions of this note have been templated and/or copied from initial evaluation, reassessments and prior notes for documentation efficiency.    Note: If patient does not return for scheduled/recommended follow up visits, this note will serve as a discharge from care along with the most recent update on progress.    Ortho Evaluation

## 2025-03-06 ENCOUNTER — HOSPITAL ENCOUNTER (OUTPATIENT)
Dept: PHYSICAL THERAPY | Age: 75
Setting detail: THERAPIES SERIES
Discharge: HOME | End: 2025-03-06
Attending: FAMILY MEDICINE
Payer: MEDICARE

## 2025-03-06 ENCOUNTER — OFFICE VISIT (OUTPATIENT)
Dept: ORTHOPEDIC SURGERY | Age: 75
End: 2025-03-06
Payer: MEDICARE

## 2025-03-06 VITALS — HEIGHT: 69 IN | WEIGHT: 224 LBS | BODY MASS INDEX: 33.18 KG/M2

## 2025-03-06 DIAGNOSIS — M79.605 LEFT LEG PAIN: Primary | ICD-10-CM

## 2025-03-06 PROCEDURE — 1123F ACP DISCUSS/DSCN MKR DOCD: CPT | Performed by: PHYSICIAN ASSISTANT

## 2025-03-06 PROCEDURE — G8417 CALC BMI ABV UP PARAM F/U: HCPCS | Performed by: PHYSICIAN ASSISTANT

## 2025-03-06 PROCEDURE — 1036F TOBACCO NON-USER: CPT | Performed by: PHYSICIAN ASSISTANT

## 2025-03-06 PROCEDURE — 97112 NEUROMUSCULAR REEDUCATION: CPT

## 2025-03-06 PROCEDURE — 97140 MANUAL THERAPY 1/> REGIONS: CPT

## 2025-03-06 PROCEDURE — 3017F COLORECTAL CA SCREEN DOC REV: CPT | Performed by: PHYSICIAN ASSISTANT

## 2025-03-06 PROCEDURE — 99203 OFFICE O/P NEW LOW 30 MIN: CPT | Performed by: PHYSICIAN ASSISTANT

## 2025-03-06 PROCEDURE — 1159F MED LIST DOCD IN RCRD: CPT | Performed by: PHYSICIAN ASSISTANT

## 2025-03-06 PROCEDURE — G8427 DOCREV CUR MEDS BY ELIG CLIN: HCPCS | Performed by: PHYSICIAN ASSISTANT

## 2025-03-06 PROCEDURE — 97110 THERAPEUTIC EXERCISES: CPT

## 2025-03-06 RX ORDER — METHYLPREDNISOLONE 4 MG/1
4 TABLET ORAL SEE ADMIN INSTRUCTIONS
Qty: 1 KIT | Refills: 0 | Status: SHIPPED | OUTPATIENT
Start: 2025-03-06 | End: 2025-03-12

## 2025-03-06 NOTE — PROGRESS NOTES
as directed 30 mL 0    montelukast (SINGULAIR) 10 MG tablet Take 1 tablet by mouth daily 90 tablet 3    albuterol sulfate HFA (PROAIR HFA) 108 (90 Base) MCG/ACT inhaler Inhale 2 puffs into the lungs every 4 hours as needed for Wheezing or Shortness of Breath 18 g 5    B Complex Vitamins (VITAMIN B-COMPLEX PO) Take by mouth      docusate sodium (COLACE) 100 MG capsule Take 1 capsule by mouth 2 times daily      Probiotic Product (PROBIOTIC PO) Take by mouth      metroNIDAZOLE (METROGEL) 0.75 % gel Apply topically  daily. 45 g 0    albuterol sulfate HFA (VENTOLIN HFA) 108 (90 Base) MCG/ACT inhaler Inhale 2 puffs into the lungs 4 times daily as needed for Wheezing 18 g 0    Magnesium 100 MG CAPS Take 400 mg by mouth daily       vitamin C (ASCORBIC ACID) 500 MG tablet Take 2 tablets by mouth daily      Multiple Vitamins-Minerals (THERAPEUTIC MULTIVITAMIN-MINERALS) tablet Take 1 tablet by mouth daily      glucosamine-chondroitin 500-400 MG CAPS Take by mouth daily        No current facility-administered medications on file prior to visit.         Objective:   Height 1.753 m (5' 9\"), weight 101.6 kg (224 lb).    On examination is a very pleasant 75-year-old gentleman who is alert and oriented x 3.  Get some sitting standing comfortably has good symmetric motion through the hip joint without pain over the greater trochanteric area of either hip.  He does have some tightness to his lower back walks with a small amount of antalgia when he first stands up.  He has a mildly positive straight leg raise more in the left than the right.  He has intact distal pulses good dorsiflexion plantarflexion strength.  He has fair hip flexion and abduction strength.    He has some limited range of motion through his lower back with some pain elicited with trunk extension and forward flexion  Neuro exam grossly intact both lower extremities. Intact sensation to light touch. Motor exam 4+ to 5/5 in all major motor groups.  Negative Duncan's

## 2025-03-06 NOTE — FLOWSHEET NOTE
Adjusted          Overall Progression Towards Functional goals/ Treatment Progress Update:  [] Patient is progressing as expected towards functional goals listed.    [] Progression is slowed due to complexities/Impairments listed.  [] Progression has been slowed due to co-morbidities.  [x] Plan just implemented, too soon (<30days) to assess goals progression   [] Goals require adjustment due to lack of progress  [] Patient is not progressing as expected and requires additional follow up with physician  [] Other:     TREATMENT PLAN     Frequency/Duration: 1-2x/week for 8-10 weeks for the following treatment interventions:    Interventions:  Therapeutic Exercise (63130) including: strength training, ROM, and functional mobility  Therapeutic Activities (78330) including: functional mobility training and education.  Neuromuscular Re-education (83835) activation and proprioception, including postural re-education.    Manual Therapy (57220) as indicated to include: Passive Range of Motion, Gr I-IV mobilizations, Soft Tissue Mobilization, and Dry Needling/IASTM  Modalities as needed that may include: Electrical Stimulation and Thermal Agents  Patient education on postural re-education, activity modification, and progression of HEP    Plan: Cont POC- Continue emphasis/focus on exercise progression, modulating pain, and increasing ROM. Next visit plan to progress reps .   Continue per 3/6/25      Electronically Signed by Igor Galarza, PT  Date: 03/06/2025     Note: Portions of this note have been templated and/or copied from initial evaluation, reassessments and prior notes for documentation efficiency.    Note: If patient does not return for scheduled/recommended follow up visits, this note will serve as a discharge from care along with the most recent update on progress.    Ortho Evaluation

## 2025-03-11 ENCOUNTER — HOSPITAL ENCOUNTER (OUTPATIENT)
Dept: PHYSICAL THERAPY | Age: 75
Setting detail: THERAPIES SERIES
Discharge: HOME | End: 2025-03-11
Attending: FAMILY MEDICINE
Payer: MEDICARE

## 2025-03-11 PROCEDURE — 97112 NEUROMUSCULAR REEDUCATION: CPT

## 2025-03-11 PROCEDURE — 97110 THERAPEUTIC EXERCISES: CPT

## 2025-03-11 PROCEDURE — 97140 MANUAL THERAPY 1/> REGIONS: CPT

## 2025-03-11 NOTE — FLOWSHEET NOTE
[facility] - Outpatient Rehabilitation and Therapy: [address], [place] [address] office: [phone] fax: [phone]         Physical Therapy: TREATMENT/PROGRESS NOTE   Patient: Wm Tariq (75 y.o. male)   Examination Date: 2025   :  1950 MRN: [id number]   Visit #: 8   Insurance Allowable Auth Needed   mn []Yes    [x]No    Insurance: Payor: MEDICARE / Plan: MEDICARE PART A AND B / Product Type: *No Product type* /   Insurance ID: [id number] - (Medicare)  Secondary Insurance (if applicable): MEDICAL M[employer]UAL   Treatment Diagnosis:     ICD-10-CM    1. Impaired functional mobility and activity tolerance  Z74.09          Medical Diagnosis:  Pain of left lower extremity [M79.605]   Referring Physician: Aidan Skelton MD  PCP: Aidan Skelton MD     Plan of care signed (Y/N): Y    Date of Patient follow up with Physician:      Plan of Care Report: NO  POC update due: (10 visits /OR AUTH LIMITS, whichever is less)  3/14/2025                                             Medical History:  Comorbidities:  asthma, hernia repair, vertigo  Relevant Medical History: asthma, hernia repair, vertigo                                         Precautions/ Contra-indications:           Latex allergy:  NO  Pacemaker:    NO  Contraindications for Manipulation: None  Date of Surgery: na  Other:    Red Flags:  None    Suicide Screening:   The patient did not verbalize a primary behavioral concern, suicidal ideation, suicidal intent, or demonstrate suicidal behaviors.    Preferred Language for Healthcare:   [x] English       [] other:    SUBJECTIVE EXAMINATION     Patient stated complaint: pt stated he woke with a Carmine horse on 25.  Pt has muscle tightness \"from the hip all the way down.\" Pt pointed to posterior hip and posterolateral thigh and calf, and bottom of the L foot.  Pt has intermittent L LE tingling at the L dorsal foot, occurring for \"5 minutes at a time.\"  Pt has difficulty walking,

## 2025-03-13 ENCOUNTER — HOSPITAL ENCOUNTER (OUTPATIENT)
Dept: PHYSICAL THERAPY | Age: 75
Setting detail: THERAPIES SERIES
Discharge: HOME | End: 2025-03-13
Attending: FAMILY MEDICINE
Payer: MEDICARE

## 2025-03-13 PROCEDURE — 97140 MANUAL THERAPY 1/> REGIONS: CPT

## 2025-03-13 PROCEDURE — 97110 THERAPEUTIC EXERCISES: CPT

## 2025-03-13 PROCEDURE — 97112 NEUROMUSCULAR REEDUCATION: CPT

## 2025-03-13 NOTE — FLOWSHEET NOTE
Functional questionnaire WOMAC 55/ 57.3% 45/ 46.9%    Other:                Pain:  Pain location: L LE  Patient describes pain to be dull and aching, uncomfortable  Pain decreases with: massage gun \"helps for a while\" (2 hours)  Pain increases with: standing, kneeling, squatting, reaching     Living status: significant other, she is able to help pt when his pain is acting up    Occupation/School:  Work/School Status: retired      Hand Dominance: R    Sport/ Recreation/ Leisure/ Hobbies: goes to gym 3 days a week, house work, yard work; has been sedentary recently \"because of the snow\"    Review Of Systems (ROS):  [x] Performed Review of systems (Integumentary, CardioPulmonary, Neurological) by intake and observation. Intake form has been scanned into medical record. Patient has been instructed to contact their primary care physician regarding ROS issues if not already being addressed at this time.    [x] Patient history, allergies, meds reviewed. Medical chart reviewed. See intake form.     OBJECTIVE EXAMINATION        Mvmt (norm) AROM L  2/12/25 AROM R  2/12/25 AROM L  2/27/25 AROM L     LUMBAR Flex (90) Min limit, L ankle pain Min limit, no pain     Ext (25) To neutral To neutral     SB (25) WFL Min limit NT     Rotation (30) WFL WFL NT        HIP Flex (120) WFL, but tightness of L Buttock WFL      Abd (45)        ER (50)        IR (45)        Ext (20)             MMT L  2/12/25 MMT R  2/12/25 MMT L  2/27/25 MMT R  2/27/25       HIP  Flexion 4/5 4/5 5/5 5/5     Abduction         ER         IR         Extension       KNEE  Flexion 4+/5 5/5 5/5 5/5     Extension 4/5 5/5 5/5 5/5     ANKLE  DF 4-/5 5/5       PF 5/5 5/5       Inversion 4+/5 4+/5       Eversion 4+/5 4+/5       2/12/25  Palpation:   Patient reported tenderness with palpation  Location:TTP L L4/5 multifidi  Posture:   flexed  Reflexes: Abnormal findings listed below  2/3 B quads, 0/3 B Achilles, (-) B clonus  Dural test (+) L LE, (-) R LE  Specific

## 2025-03-18 ENCOUNTER — HOSPITAL ENCOUNTER (OUTPATIENT)
Dept: PHYSICAL THERAPY | Age: 75
Setting detail: THERAPIES SERIES
Discharge: HOME | End: 2025-03-18
Attending: FAMILY MEDICINE
Payer: MEDICARE

## 2025-03-18 PROCEDURE — 97140 MANUAL THERAPY 1/> REGIONS: CPT

## 2025-03-18 PROCEDURE — 97112 NEUROMUSCULAR REEDUCATION: CPT

## 2025-03-18 PROCEDURE — 97110 THERAPEUTIC EXERCISES: CPT

## 2025-03-18 NOTE — FLOWSHEET NOTE
Abnormal findings listed below  2/3 B quads, 0/3 B Achilles, (-) B clonus  Dural test (+) L LE, (-) R LE  Specific Joint Mobility Testing/Accessory Motions:      PA testing lumbar central, L and R did not reproduce pain  Gait:    Pattern: L Trendelenburg, B LE circumduction  Assistive Device Used: no AD         Exercises/Interventions     Therapeutic Ex (23393)  resistance Sets/time Reps Notes/Cues/Progressions   HEP 2/12/25      Education: anatomy of sciatic nerve, piriformis, lumbar spine 2/12/25      Nu Step  6'     Sitting HS stretch  30\" 3    Standing gastroc stretch  30\" 3    Sitting trunk flexion, hands on swiss ball   15                    Reassessed 2/27/25      LTR stretch  30\" 2           Manual Intervention (55345)  TIME     STM L lumbar/tx paraspinals   12'    STM L glut       Sciatic dural flossing- supine   10                  NMR re-education (67641) resistance Sets/time Reps CUES NEEDED   Bridge with abd vs band  5\" 10    Hooklying abd w/ band lime 5\" 15    KATE  Knee extension 8/9   5\"   10    Knee flexion  5\" 10    Standing march   10    Mini-squat   10    Hooklying with green swiss ball  LTR  DKTC     10  10           Therapeutic Activity (74878)  Sets/time                                          Modalities:    No modalities applied this session    Education/Home Exercise Program:     Access Code: [email or web address]  URL: [email or web address]  Date: 03/13/2025  Prepared by: Igor Galarza    Exercises  - Supine Lower Trunk Rotation  - 1 x daily - 7 x weekly - 2 reps - 30 hold  - Bent Knee Fallouts  - 1 x daily - 7 x weekly - 10 reps  - Supine Bridge  - 1 x daily - 7 x weekly - 10 reps - 5 hold  - Hooklying Single Leg March  - 1 x daily - 7 x weekly - 10 reps  - Seated Forward Bending  - 1 x daily - 7 x weekly - 5 reps - 5 hold  - Supine Piriformis Stretch with Leg Straight  - 1 x daily - 7 x weekly - 2 reps - 30 hold  - Seated Hamstring Stretch with Chair  - 1 x daily - 7 x weekly - 2 reps - 30

## 2025-03-20 ENCOUNTER — HOSPITAL ENCOUNTER (OUTPATIENT)
Dept: PHYSICAL THERAPY | Age: 75
Setting detail: THERAPIES SERIES
Discharge: HOME | End: 2025-03-20
Attending: FAMILY MEDICINE
Payer: MEDICARE

## 2025-03-20 PROCEDURE — 97110 THERAPEUTIC EXERCISES: CPT

## 2025-03-20 PROCEDURE — 97112 NEUROMUSCULAR REEDUCATION: CPT

## 2025-03-20 PROCEDURE — 97140 MANUAL THERAPY 1/> REGIONS: CPT

## 2025-03-20 NOTE — FLOWSHEET NOTE
[facility] - Outpatient Rehabilitation and Therapy: [address], [place] [address] office: [phone] fax: [phone]         Physical Therapy: TREATMENT/PROGRESS NOTE   Patient: Wm Tariq (75 y.o. male)   Examination Date: 2025   :  1950 MRN: [id number]   Visit #: 11   Insurance Allowable Auth Needed   mn []Yes    [x]No    Insurance: Payor: MEDICARE / Plan: MEDICARE PART A AND B / Product Type: *No Product type* /   Insurance ID: [id number] - (Medicare)  Secondary Insurance (if applicable): [employer]   Treatment Diagnosis:     ICD-10-CM    1. Impaired functional mobility and activity tolerance  Z74.09          Medical Diagnosis:  Pain of left lower extremity [M79.605]   Referring Physician: Aidan Skelton MD  PCP: Aidan Skelton MD     Plan of care signed (Y/N): Y    Date of Patient follow up with Physician:      Plan of Care Report: NO  POC update due: (10 visits /OR AUTH LIMITS, whichever is less)  3/14/2025                                             Medical History:  Comorbidities:  asthma, hernia repair, vertigo  Relevant Medical History: asthma, hernia repair, vertigo                                         Precautions/ Contra-indications:           Latex allergy:  NO  Pacemaker:    NO  Contraindications for Manipulation: None  Date of Surgery: na  Other:    Red Flags:  None    Suicide Screening:   The patient did not verbalize a primary behavioral concern, suicidal ideation, suicidal intent, or demonstrate suicidal behaviors.    Preferred Language for Healthcare:   [x] English       [] other:    SUBJECTIVE EXAMINATION     Patient stated complaint: pt stated he woke with a Carmine horse on 25.  Pt has muscle tightness \"from the hip all the way down.\" Pt pointed to posterior hip and posterolateral thigh and calf, and bottom of the L foot.  Pt has intermittent L LE tingling at the L dorsal foot, occurring for \"5 minutes at a time.\"  Pt has difficulty walking,

## 2025-03-24 RX ORDER — BUSPIRONE HYDROCHLORIDE 10 MG/1
10 TABLET ORAL 2 TIMES DAILY
Qty: 180 TABLET | Refills: 1 | Status: SHIPPED | OUTPATIENT
Start: 2025-03-24

## 2025-03-25 ENCOUNTER — HOSPITAL ENCOUNTER (OUTPATIENT)
Dept: PHYSICAL THERAPY | Age: 75
Setting detail: THERAPIES SERIES
Discharge: HOME | End: 2025-03-25
Attending: FAMILY MEDICINE
Payer: MEDICARE

## 2025-03-25 PROCEDURE — 97112 NEUROMUSCULAR REEDUCATION: CPT

## 2025-03-25 PROCEDURE — 97110 THERAPEUTIC EXERCISES: CPT

## 2025-03-25 PROCEDURE — 97140 MANUAL THERAPY 1/> REGIONS: CPT

## 2025-03-25 NOTE — FLOWSHEET NOTE
[facility] - Outpatient Rehabilitation and Therapy: [address], [place] [address] office: [phone] fax: [phone]         Physical Therapy: TREATMENT/PROGRESS NOTE   Patient: Wm Tariq (75 y.o. male)   Examination Date: 2025   :  1950 MRN: [id number]   Visit #: 12   Insurance Allowable Auth Needed   mn []Yes    [x]No    Insurance: Payor: MEDICARE / Plan: MEDICARE PART A AND B / Product Type: *No Product type* /   Insurance ID: [id number] - (Medicare)  Secondary Insurance (if applicable): [employer]   Treatment Diagnosis:     ICD-10-CM    1. Impaired functional mobility and activity tolerance  Z74.09          Medical Diagnosis:  Pain of left lower extremity [M79.605]   Referring Physician: Aidan Skelton MD  PCP: Aidan Skelton MD     Plan of care signed (Y/N): Y    Date of Patient follow up with Physician:      Plan of Care Report: NO  POC update due: (10 visits /OR AUTH LIMITS, whichever is less)  3/14/2025                                             Medical History:  Comorbidities:  asthma, hernia repair, vertigo  Relevant Medical History: asthma, hernia repair, vertigo                                         Precautions/ Contra-indications:           Latex allergy:  NO  Pacemaker:    NO  Contraindications for Manipulation: None  Date of Surgery: na  Other:    Red Flags:  None    Suicide Screening:   The patient did not verbalize a primary behavioral concern, suicidal ideation, suicidal intent, or demonstrate suicidal behaviors.    Preferred Language for Healthcare:   [x] English       [] other:    SUBJECTIVE EXAMINATION     Patient stated complaint: pt stated he woke with a Carmine horse on 25.  Pt has muscle tightness \"from the hip all the way down.\" Pt pointed to posterior hip and posterolateral thigh and calf, and bottom of the L foot.  Pt has intermittent L LE tingling at the L dorsal foot, occurring for \"5 minutes at a time.\"  Pt has difficulty walking,

## 2025-04-11 ENCOUNTER — OFFICE VISIT (OUTPATIENT)
Dept: ORTHOPEDIC SURGERY | Age: 75
End: 2025-04-11

## 2025-04-11 VITALS — BODY MASS INDEX: 33.18 KG/M2 | HEIGHT: 69 IN | WEIGHT: 224 LBS

## 2025-04-11 DIAGNOSIS — M70.62 TROCHANTERIC BURSITIS OF LEFT HIP: Primary | ICD-10-CM

## 2025-04-11 RX ORDER — TRIAMCINOLONE ACETONIDE 40 MG/ML
40 INJECTION, SUSPENSION INTRA-ARTICULAR; INTRAMUSCULAR ONCE
Status: COMPLETED | OUTPATIENT
Start: 2025-04-11 | End: 2025-04-11

## 2025-04-11 RX ORDER — BUPIVACAINE HYDROCHLORIDE 2.5 MG/ML
2 INJECTION, SOLUTION INFILTRATION; PERINEURAL ONCE
Status: COMPLETED | OUTPATIENT
Start: 2025-04-11 | End: 2025-04-11

## 2025-04-11 RX ADMIN — TRIAMCINOLONE ACETONIDE 40 MG: 40 INJECTION, SUSPENSION INTRA-ARTICULAR; INTRAMUSCULAR at 11:15

## 2025-04-11 RX ADMIN — BUPIVACAINE HYDROCHLORIDE 5 MG: 2.5 INJECTION, SOLUTION INFILTRATION; PERINEURAL at 11:15

## 2025-05-30 ENCOUNTER — OFFICE VISIT (OUTPATIENT)
Dept: FAMILY MEDICINE CLINIC | Age: 75
End: 2025-05-30

## 2025-05-30 VITALS
WEIGHT: 217.8 LBS | TEMPERATURE: 98.1 F | BODY MASS INDEX: 32.26 KG/M2 | HEART RATE: 99 BPM | HEIGHT: 69 IN | DIASTOLIC BLOOD PRESSURE: 78 MMHG | OXYGEN SATURATION: 97 % | SYSTOLIC BLOOD PRESSURE: 118 MMHG

## 2025-05-30 DIAGNOSIS — M25.552 LEFT HIP PAIN: ICD-10-CM

## 2025-05-30 DIAGNOSIS — F41.9 ANXIETY: ICD-10-CM

## 2025-05-30 DIAGNOSIS — G20.A1 PARKINSON'S DISEASE, UNSPECIFIED WHETHER DYSKINESIA PRESENT, UNSPECIFIED WHETHER MANIFESTATIONS FLUCTUATE (HCC): Primary | ICD-10-CM

## 2025-05-30 RX ORDER — CARBIDOPA AND LEVODOPA 25; 100 MG/1; MG/1
1 TABLET ORAL 3 TIMES DAILY
COMMUNITY
Start: 2025-05-29 | End: 2025-12-09

## 2025-05-30 RX ORDER — BUSPIRONE HYDROCHLORIDE 10 MG/1
10 TABLET ORAL 3 TIMES DAILY
Qty: 180 TABLET | Refills: 1
Start: 2025-05-30

## 2025-05-30 RX ORDER — MELOXICAM 7.5 MG/1
7.5 TABLET ORAL DAILY
Qty: 10 TABLET | Refills: 0 | Status: SHIPPED | OUTPATIENT
Start: 2025-05-30

## 2025-05-30 NOTE — PROGRESS NOTES
Wm Tariq (:  1950) is a 75 y.o. male,Established patient, here for evaluation of the following chief complaint(s):  Groin Pain (Left side groin pain x 1 week) and Anxiety      Subjective       HPI    Trouble with anxiety. Pt is on buspar for the past few mths. Pt had been helpful.    Pt was started on carbidopa-levopdopa per neuro.  Parkinsons affects left side.    Left groin pain. Started one week ago. Pt has been walking on treadmill for the past couple of mths. No trauma or new activity prior to groin.  Trouble when getting up from sitting.  Pain gets better the more walk on this leg. Pain worse in the morning. Leg does not feel like it is giving out.     Have seen Dr Dorsey in the past.      Review of Systems   Constitutional:  Negative for chills and fever.   Musculoskeletal:  Positive for arthralgias.   Psychiatric/Behavioral:  Negative for dysphoric mood. The patient is nervous/anxious.              Objective     /78 (BP Site: Left Upper Arm, Patient Position: Sitting, BP Cuff Size: Medium Adult)   Pulse 99   Temp 98.1 °F (36.7 °C) (Temporal)   Ht 1.753 m (5' 9\")   Wt 98.8 kg (217 lb 12.8 oz)   SpO2 97%   BMI 32.16 kg/m²      Physical Exam  Constitutional:       Appearance: Normal appearance.   HENT:      Head: Normocephalic and atraumatic.   Musculoskeletal:         General: Normal range of motion.   Neurological:      General: No focal deficit present.      Mental Status: He is alert and oriented to person, place, and time.   Psychiatric:         Mood and Affect: Mood normal.         Behavior: Behavior normal.              Wm \"Holly" was seen today for groin pain and anxiety.    Diagnoses and all orders for this visit:    Parkinson's disease, unspecified whether dyskinesia present, unspecified whether manifestations fluctuate ([facility])-started carbidopa-levodopa.  Managed per neurology.  -     Non [facility] - External Referral To Neurology    Anxiety-chronic, unstable.  Did

## 2025-05-30 NOTE — PATIENT INSTRUCTIONS
Increase buspar to three times per day.  Mobic for ten days. Do not take with other otc NSAIDs  Referral to Dr King Berenice foote at

## 2025-06-02 ENCOUNTER — OFFICE VISIT (OUTPATIENT)
Dept: ORTHOPEDIC SURGERY | Age: 75
End: 2025-06-02
Payer: MEDICARE

## 2025-06-02 VITALS — WEIGHT: 217 LBS | BODY MASS INDEX: 32.14 KG/M2 | HEIGHT: 69 IN

## 2025-06-02 DIAGNOSIS — M16.12 ARTHRITIS OF LEFT HIP: Primary | ICD-10-CM

## 2025-06-02 PROCEDURE — 3017F COLORECTAL CA SCREEN DOC REV: CPT | Performed by: PHYSICIAN ASSISTANT

## 2025-06-02 PROCEDURE — 1123F ACP DISCUSS/DSCN MKR DOCD: CPT | Performed by: PHYSICIAN ASSISTANT

## 2025-06-02 PROCEDURE — 99213 OFFICE O/P EST LOW 20 MIN: CPT | Performed by: PHYSICIAN ASSISTANT

## 2025-06-02 PROCEDURE — 1036F TOBACCO NON-USER: CPT | Performed by: PHYSICIAN ASSISTANT

## 2025-06-02 PROCEDURE — G8428 CUR MEDS NOT DOCUMENT: HCPCS | Performed by: PHYSICIAN ASSISTANT

## 2025-06-02 PROCEDURE — G8417 CALC BMI ABV UP PARAM F/U: HCPCS | Performed by: PHYSICIAN ASSISTANT

## 2025-06-02 NOTE — PROGRESS NOTES
Subjective:      Patient ID: Wm Tariq is a 75 y.o. male who presents to the office today for an initial assessment of left anterior hip/ groin pain.  He was last treated in the office on 4/11/2025 for left hip trochanteric bursitis.  At that time he did undergo a left hip trochanteric injection with relief of lateral hip pain.  Over the past 2 weeks he has developed pain in the left groin 3/10 VAS.  He was also recently diagnosed with Parkinson's disease.  He started a new medication,  levodopa which has helped with his overall stiffness.    Onset of symptoms 2 weeks.   These symptoms have not been progressive in nature.   There is not a history of injury.    Pain is intermittent.   Location of pain left groin.   Quality of pain is aching and stabbing.   Pain is on average 2-3/10 VAS.   Pain is worse with weight bearing activity.   Pain improves with rest and elevation.   There typically is not associated numbness/ tingling.     Previous treatments have included: Recently started meloxicam.  Not sure if this is helping..      Review of Systems:  Constitutional: denies fever, chills, weight loss.  MSK: denies pain in other joints, muscle aches.  Neurological: denies numbness, tingling, weakness.      Past Medical History:   Diagnosis Date    Asthma     Diverticulitis     Influenza A 01/05/2020    Irritable bowel syndrome     Prostatitis     Seasonal allergies        Family History   Problem Relation Age of Onset    Cancer Father         colon    High Blood Pressure Father        Past Surgical History:   Procedure Laterality Date    COLONOSCOPY  09/04/2012    Dr. Babcock,polyp, check in 5 years    COLONOSCOPY  09/06/2017    dr rhodes at [facility] polyp, repeat 5 years    COLONOSCOPY  07/13/2021    ok dr bo. repeat in 5 years due to polyp hx and family hx    HERNIA REPAIR  07/25/2018    umbilical    NASAL SINUS SURGERY  about 1998       Social History     Occupational History    Not on file   Tobacco Use

## 2025-06-30 NOTE — TELEPHONE ENCOUNTER
Please advise    Last OV: 5/8/25  Next OV: n/a  Last lab: 4/23/25  Lorazepam 1MG   Dispensed: 5/6/2025  Sold: 5/6/2025      LORazepam (ATIVAN) 1 MG tablet         Sig: Take 1 tablet by mouth every 8 hours as needed for Anxiety.    Disp: 60 tablet    Refills: 0    Start: 6/30/2025    Class: Eprescribe    PDMP Review May Be Needed    Non-formulary    To pharmacy: 10/31/24- Cancel prescription for lorazepam 0.5 mg.    Last ordered: 1 month ago (5/5/2025) by Telly Redding DO    Controlled Substances Refill Protocol - 12 Month Protocol - ALWAYS forward to ordering provider [id number]/30/2025 12:22 PM   Protocol Details FORWARD TO PROVIDER - Refill requests for these medications must ALWAYS be forwarded to the ordering provider, even if all criteria are met    PDMP has been reviewed in last 24 hours    Medication (including dose and sig) on current med list    Seen by prescribing provider or same department within the last 6 months or has a future appt in 6 months - IF FAILED PLEASE LOOK AT CHART REVIEW FOR LAST VISIT AND PROCEED ACCORDINGLY    Urine/serum drug screen on file in the appropriate time frame    Active/up-to-date controlled substances agreement/consent on file         Pt is calling for the psa order to get rechecked  Pl advise   [phone]

## 2025-07-03 ENCOUNTER — OFFICE VISIT (OUTPATIENT)
Dept: FAMILY MEDICINE CLINIC | Age: 75
End: 2025-07-03

## 2025-07-03 VITALS
HEART RATE: 85 BPM | WEIGHT: 216 LBS | HEIGHT: 69 IN | TEMPERATURE: 98.3 F | DIASTOLIC BLOOD PRESSURE: 72 MMHG | BODY MASS INDEX: 31.99 KG/M2 | OXYGEN SATURATION: 97 % | SYSTOLIC BLOOD PRESSURE: 126 MMHG

## 2025-07-03 DIAGNOSIS — J01.90 ACUTE BACTERIAL SINUSITIS: Primary | ICD-10-CM

## 2025-07-03 DIAGNOSIS — B96.89 ACUTE BACTERIAL SINUSITIS: Primary | ICD-10-CM

## 2025-07-03 RX ORDER — AZITHROMYCIN 250 MG/1
TABLET, FILM COATED ORAL
Qty: 6 TABLET | Refills: 0 | Status: SHIPPED | OUTPATIENT
Start: 2025-07-03 | End: 2025-07-13

## 2025-07-03 NOTE — PROGRESS NOTES
Wm Tariq (:  1950) is a 75 y.o. male,Established patient, here for evaluation of the following chief complaint(s):  Cold Symptoms (Pt c/o sore throat, head and nasal congestion, cough, drainage,  slight headache. Warm feeling in ears, \"no drainage or pain.\" Pt denies fever. Pt has been gargling hydrogen peroxide, and using sinus rinse. )         Assessment & Plan  Acute bacterial sinusitis                 Assessment & Plan  1. Sinus infection: Acute.  - Reports sinus congestion, headaches, slight ear pain, and nasal discharge ranging from clear to yellow. No facial pain, fever, chills, or body aches. Physical examination reveals no sinus tenderness or lymph node enlargement.  - Prescribe Z-Wilian (azithromycin).  - If symptoms do not improve, contact the office.    2. Right ear infection: Acute.  - Reports slight ear pain and pressure. Physical examination reveals a little fluid on the right side.  - Prescribe Z-Wilian (azithromycin).  - If symptoms do not improve, contact the office.    Follow-up  - If symptoms do not improve, contact the office.       Return if symptoms worsen or fail to improve.       Subjective   HPI     History of Present Illness  The patient is a 75-year-old male who presents with respiratory symptoms.    He has been experiencing sinus congestion for approximately one week, which he believes occurs annually. He reports no facial pain but has started to experience headaches localized over his eyes. He also mentions mild ear pain and pressure in both ears. His nasal discharge varies from clear to yellow. He has a cough, which he attributes to postnasal drip rather than lung issues. He reports no wheezing, shortness of breath, chest pain, fevers, chills, or body aches. He confirms that no one else in his household is ill. He has been using a neti pot rinse and gargling as part of his treatment regimen, but these measures have not provided relief.    His Parkinson's disease is stable.

## 2025-07-14 ENCOUNTER — OFFICE VISIT (OUTPATIENT)
Dept: FAMILY MEDICINE CLINIC | Age: 75
End: 2025-07-14

## 2025-07-14 VITALS
TEMPERATURE: 97.3 F | DIASTOLIC BLOOD PRESSURE: 82 MMHG | HEIGHT: 69 IN | HEART RATE: 82 BPM | SYSTOLIC BLOOD PRESSURE: 120 MMHG | BODY MASS INDEX: 31.96 KG/M2 | OXYGEN SATURATION: 96 % | WEIGHT: 215.8 LBS

## 2025-07-14 DIAGNOSIS — J32.9 RECURRENT SINUSITIS: Primary | ICD-10-CM

## 2025-07-14 RX ORDER — DOXYCYCLINE HYCLATE 100 MG
100 TABLET ORAL 2 TIMES DAILY
Qty: 20 TABLET | Refills: 0 | Status: SHIPPED | OUTPATIENT
Start: 2025-07-14 | End: 2025-07-24

## 2025-07-14 ASSESSMENT — ENCOUNTER SYMPTOMS
SORE THROAT: 0
WHEEZING: 0
SHORTNESS OF BREATH: 0
SINUS PAIN: 1
COUGH: 1
SINUS PRESSURE: 1

## 2025-07-26 DIAGNOSIS — J32.9 RHINOSINUSITIS: ICD-10-CM

## 2025-07-28 RX ORDER — MONTELUKAST SODIUM 10 MG/1
10 TABLET ORAL DAILY
Qty: 90 TABLET | Refills: 3 | Status: SHIPPED | OUTPATIENT
Start: 2025-07-28

## 2025-07-31 ENCOUNTER — OFFICE VISIT (OUTPATIENT)
Dept: ORTHOPEDIC SURGERY | Age: 75
End: 2025-07-31

## 2025-07-31 ENCOUNTER — HOSPITAL ENCOUNTER (OUTPATIENT)
Dept: GENERAL RADIOLOGY | Age: 75
Discharge: HOME | End: 2025-07-31
Attending: ORTHOPAEDIC SURGERY
Payer: MEDICARE

## 2025-07-31 ENCOUNTER — OFFICE VISIT (OUTPATIENT)
Dept: PULMONOLOGY | Age: 75
End: 2025-07-31
Payer: MEDICARE

## 2025-07-31 VITALS
BODY MASS INDEX: 31.96 KG/M2 | HEART RATE: 77 BPM | HEIGHT: 69 IN | RESPIRATION RATE: 18 BRPM | DIASTOLIC BLOOD PRESSURE: 68 MMHG | OXYGEN SATURATION: 96 % | SYSTOLIC BLOOD PRESSURE: 112 MMHG | WEIGHT: 215.8 LBS | TEMPERATURE: 98.1 F

## 2025-07-31 DIAGNOSIS — M16.12 ARTHRITIS OF LEFT HIP: Primary | ICD-10-CM

## 2025-07-31 DIAGNOSIS — J32.9 RHINOSINUSITIS: ICD-10-CM

## 2025-07-31 DIAGNOSIS — M16.12 ARTHRITIS OF LEFT HIP: ICD-10-CM

## 2025-07-31 DIAGNOSIS — J45.990 EXERCISE-INDUCED ASTHMA: Primary | ICD-10-CM

## 2025-07-31 PROCEDURE — G8427 DOCREV CUR MEDS BY ELIG CLIN: HCPCS | Performed by: INTERNAL MEDICINE

## 2025-07-31 PROCEDURE — 3017F COLORECTAL CA SCREEN DOC REV: CPT | Performed by: INTERNAL MEDICINE

## 2025-07-31 PROCEDURE — G2211 COMPLEX E/M VISIT ADD ON: HCPCS | Performed by: INTERNAL MEDICINE

## 2025-07-31 PROCEDURE — 1036F TOBACCO NON-USER: CPT | Performed by: INTERNAL MEDICINE

## 2025-07-31 PROCEDURE — 1159F MED LIST DOCD IN RCRD: CPT | Performed by: INTERNAL MEDICINE

## 2025-07-31 PROCEDURE — 6360000002 HC RX W HCPCS

## 2025-07-31 PROCEDURE — 1123F ACP DISCUSS/DSCN MKR DOCD: CPT | Performed by: INTERNAL MEDICINE

## 2025-07-31 PROCEDURE — 99214 OFFICE O/P EST MOD 30 MIN: CPT | Performed by: INTERNAL MEDICINE

## 2025-07-31 PROCEDURE — 77002 NEEDLE LOCALIZATION BY XRAY: CPT

## 2025-07-31 PROCEDURE — G8417 CALC BMI ABV UP PARAM F/U: HCPCS | Performed by: INTERNAL MEDICINE

## 2025-07-31 PROCEDURE — 20610 DRAIN/INJ JOINT/BURSA W/O US: CPT

## 2025-07-31 RX ORDER — ALBUTEROL SULFATE 90 UG/1
2 INHALANT RESPIRATORY (INHALATION) EVERY 4 HOURS PRN
Qty: 18 G | Refills: 11 | Status: SHIPPED | OUTPATIENT
Start: 2025-07-31

## 2025-07-31 RX ORDER — AZELASTINE 1 MG/ML
2 SPRAY, METERED NASAL 2 TIMES DAILY
Qty: 120 ML | Refills: 5 | Status: SHIPPED | OUTPATIENT
Start: 2025-07-31

## 2025-07-31 NOTE — PROGRESS NOTES
Chief complaint  This is a 75 y.o. year old male  who comes to see me with a chief complaint of   Chief Complaint   Patient presents with    Follow-up    Asthma       HPI  Here with a cc of asthma    He was having issues recently as he was diagnosed with Parkinson's disease.  He was stiff and unable to exercise.  He had medication titration that has since helped and allowed him to get more active.  He is back to exercising about 2 days a week.  Usually he exercises 5 days.  His use of albuterol is low.  He stopped singular due to interaction with other meds.  On flonase but still has runny nose.  On allegra for itching.  He is better with regards to his Parkinson's        Current Outpatient Medications:     azelastine (ASTELIN) 0.1 % nasal spray, 2 sprays by Nasal route 2 times daily Use in each nostril as directed, Disp: 120 mL, Rfl: 5    albuterol sulfate HFA (PROVENTIL;VENTOLIN;PROAIR) 108 (90 Base) MCG/ACT inhaler, Inhale 2 puffs into the lungs every 4 hours as needed for Wheezing or Shortness of Breath (or cough), Disp: 18 g, Rfl: 11    montelukast (SINGULAIR) 10 MG tablet, TAKE 1 TABLET BY MOUTH EVERY DAY, Disp: 90 tablet, Rfl: 3    carbidopa-levodopa (SINEMET)  MG per tablet, Take 1 tablet by mouth 3 times daily, Disp: , Rfl:     busPIRone (BUSPAR) 10 MG tablet, Take 1 tablet by mouth 3 times daily, Disp: 180 tablet, Rfl: 1    famotidine (PEPCID) 20 MG tablet, TAKE 1 TABLET BY MOUTH TWICE A DAY, Disp: 180 tablet, Rfl: 1    azelastine (ASTELIN) 0.1 % nasal spray, 2 sprays by Nasal route 2 times daily Use in each nostril as directed, Disp: 30 mL, Rfl: 0    albuterol sulfate HFA (PROAIR HFA) 108 (90 Base) MCG/ACT inhaler, Inhale 2 puffs into the lungs every 4 hours as needed for Wheezing or Shortness of Breath, Disp: 18 g, Rfl: 5    B Complex Vitamins (VITAMIN B-COMPLEX PO), Take by mouth, Disp: , Rfl:     docusate sodium (COLACE) 100 MG capsule, Take 1 capsule by mouth 2 times daily, Disp: , Rfl:

## 2025-07-31 NOTE — PATIENT INSTRUCTIONS
Continue with albuterol     Start astelin nasal spray twice a day    Continue with flonase     Follow up in 6 months

## 2025-08-11 RX ORDER — FAMOTIDINE 20 MG/1
20 TABLET, FILM COATED ORAL 2 TIMES DAILY
Qty: 180 TABLET | Refills: 0 | Status: SHIPPED | OUTPATIENT
Start: 2025-08-11

## 2025-09-02 DIAGNOSIS — F41.9 ANXIETY: ICD-10-CM

## 2025-09-02 RX ORDER — BUSPIRONE HYDROCHLORIDE 10 MG/1
10 TABLET ORAL 3 TIMES DAILY
Qty: 180 TABLET | Refills: 1 | Status: SHIPPED | OUTPATIENT
Start: 2025-09-02